# Patient Record
Sex: FEMALE | Race: WHITE | NOT HISPANIC OR LATINO
[De-identification: names, ages, dates, MRNs, and addresses within clinical notes are randomized per-mention and may not be internally consistent; named-entity substitution may affect disease eponyms.]

---

## 2023-01-30 PROBLEM — Z00.00 ENCOUNTER FOR PREVENTIVE HEALTH EXAMINATION: Status: ACTIVE | Noted: 2023-01-30

## 2023-02-08 RX ORDER — APIXABAN 5 MG/1
5 TABLET, FILM COATED ORAL
Refills: 6 | Status: ACTIVE | COMMUNITY

## 2023-02-08 RX ORDER — SIMVASTATIN 20 MG/1
20 TABLET, FILM COATED ORAL DAILY
Refills: 0 | Status: ACTIVE | COMMUNITY

## 2023-02-14 ENCOUNTER — NON-APPOINTMENT (OUTPATIENT)
Age: 63
End: 2023-02-14

## 2023-02-14 ENCOUNTER — APPOINTMENT (OUTPATIENT)
Dept: ELECTROPHYSIOLOGY | Facility: CLINIC | Age: 63
End: 2023-02-14
Payer: MEDICARE

## 2023-02-14 VITALS
DIASTOLIC BLOOD PRESSURE: 89 MMHG | SYSTOLIC BLOOD PRESSURE: 157 MMHG | HEIGHT: 64 IN | BODY MASS INDEX: 30.73 KG/M2 | WEIGHT: 180 LBS | HEART RATE: 79 BPM | OXYGEN SATURATION: 93 %

## 2023-02-14 DIAGNOSIS — Z82.49 FAMILY HISTORY OF ISCHEMIC HEART DISEASE AND OTHER DISEASES OF THE CIRCULATORY SYSTEM: ICD-10-CM

## 2023-02-14 DIAGNOSIS — F17.200 NICOTINE DEPENDENCE, UNSPECIFIED, UNCOMPLICATED: ICD-10-CM

## 2023-02-14 DIAGNOSIS — K42.9 UMBILICAL HERNIA W/OUT OBSTRUCTION OR GANGRENE: ICD-10-CM

## 2023-02-14 PROCEDURE — 93280 PM DEVICE PROGR EVAL DUAL: CPT

## 2023-02-14 PROCEDURE — 99204 OFFICE O/P NEW MOD 45 MIN: CPT | Mod: 25

## 2023-02-14 NOTE — REASON FOR VISIT
[Arrhythmia/ECG Abnorrmalities] : arrhythmia/ECG abnormalities [Spouse] : spouse [FreeTextEntry3] : David Hollis

## 2023-02-14 NOTE — DISCUSSION/SUMMARY
[FreeTextEntry1] : 63 year old woman with atrial arrhythmias.  THe description of feeling significantly better at the time of the pacemaker but shortly after noting shortness of breath AND the description of "burns" on her chest suggest possible cardioversion at this time and the combination of sinus rhythm AND AV synchrony is what made her feel better.  SHe has previously been on amio  I counseled her that the most effect means of rhythm control is ablation and reaffirmed the prior recommendation.  I did suggest an MRI to better assess for infiltration given her heart block at such an early age, ie heart block belwo the age of 61.  She will consider the option of ablation and we will discuss further after the MRI. PPM is MRI conditional and is functioning normally with adequate safety margins.

## 2023-02-14 NOTE — HISTORY OF PRESENT ILLNESS
[FreeTextEntry1] : 63 years old with  history of pacemaker implantation.  Approximately 10 years ago in [place] she was told that she had hypertension and atrial fibrillation.  At one point she was on metoprolol and lisinopril, but she is no longer taking these medications.  In 2018 she presented to the hospital with HF. She was told during this hospitalization that she had a very low heart beat.  She underwent PPM implantation.  She felt great after the pacemaker.  However it quickly got worse.  For the past few years she has fatigue and exercise intolerance. She is under the impression that one of the leads is broken.  She has been evaluated by EP and advised to consider an ablation procedure.  She is very reluctant because she is afraid, AND she knows people that have had it done twice.   She admits to intermittently forgetting her ELiqus dose.  SHe was on amio at some point which was stopped because "she could not be on it for a long time".

## 2023-02-14 NOTE — CARDIOLOGY SUMMARY
[de-identified] : today: Atrial flutter. RBBB \par  -Incomplete right bundle branch block and left axis -anterior fascicular block. \par  -Anterior infarct -age undetermined.  [de-identified] : 10/17/2022\par Global LV systolic function is within normal limits based on limitedviews\par The EF is 60-65% by visual estimate\par Mild KATHE\par LA is moderately dilated\par RA is moderately dilated\par Mild TR [de-identified] : 12/12/2022\par Atherosclerotic changes with no hemodynamically significant stenosis.

## 2023-04-06 ENCOUNTER — APPOINTMENT (OUTPATIENT)
Dept: NUCLEAR MEDICINE | Facility: IMAGING CENTER | Age: 63
End: 2023-04-06
Payer: MEDICARE

## 2023-04-06 ENCOUNTER — TRANSCRIPTION ENCOUNTER (OUTPATIENT)
Age: 63
End: 2023-04-06

## 2023-04-06 ENCOUNTER — OUTPATIENT (OUTPATIENT)
Dept: OUTPATIENT SERVICES | Facility: HOSPITAL | Age: 63
LOS: 1 days | End: 2023-04-06
Payer: MEDICARE

## 2023-04-06 ENCOUNTER — RESULT REVIEW (OUTPATIENT)
Age: 63
End: 2023-04-06

## 2023-04-06 DIAGNOSIS — I44.30 UNSPECIFIED ATRIOVENTRICULAR BLOCK: ICD-10-CM

## 2023-04-06 PROCEDURE — 78451 HT MUSCLE IMAGE SPECT SING: CPT

## 2023-04-06 PROCEDURE — 78451 HT MUSCLE IMAGE SPECT SING: CPT | Mod: 26

## 2023-04-06 PROCEDURE — 78429 MYOCRD IMG PET 1 STD W/CT: CPT | Mod: 26

## 2023-04-06 PROCEDURE — A9500: CPT

## 2023-04-06 PROCEDURE — A9552: CPT

## 2023-04-06 PROCEDURE — 78429 MYOCRD IMG PET 1 STD W/CT: CPT

## 2023-04-20 ENCOUNTER — APPOINTMENT (OUTPATIENT)
Dept: NUCLEAR MEDICINE | Facility: IMAGING CENTER | Age: 63
End: 2023-04-20
Payer: SELF-PAY

## 2023-04-20 ENCOUNTER — OUTPATIENT (OUTPATIENT)
Dept: OUTPATIENT SERVICES | Facility: HOSPITAL | Age: 63
LOS: 1 days | End: 2023-04-20
Payer: SELF-PAY

## 2023-04-20 ENCOUNTER — RESULT REVIEW (OUTPATIENT)
Age: 63
End: 2023-04-20

## 2023-04-20 DIAGNOSIS — I44.30 UNSPECIFIED ATRIOVENTRICULAR BLOCK: ICD-10-CM

## 2023-04-20 PROCEDURE — A9552: CPT

## 2023-04-20 PROCEDURE — 78429 MYOCRD IMG PET 1 STD W/CT: CPT | Mod: 26

## 2023-04-20 PROCEDURE — 78429 MYOCRD IMG PET 1 STD W/CT: CPT

## 2023-05-23 ENCOUNTER — APPOINTMENT (OUTPATIENT)
Dept: ELECTROPHYSIOLOGY | Facility: CLINIC | Age: 63
End: 2023-05-23
Payer: MEDICARE

## 2023-05-23 VITALS
BODY MASS INDEX: 30.73 KG/M2 | WEIGHT: 180 LBS | HEIGHT: 64 IN | OXYGEN SATURATION: 93 % | SYSTOLIC BLOOD PRESSURE: 148 MMHG | DIASTOLIC BLOOD PRESSURE: 96 MMHG | HEART RATE: 86 BPM

## 2023-05-23 PROCEDURE — 93280 PM DEVICE PROGR EVAL DUAL: CPT

## 2023-05-23 PROCEDURE — 99215 OFFICE O/P EST HI 40 MIN: CPT | Mod: 25

## 2023-05-23 RX ORDER — FUROSEMIDE 20 MG/1
20 TABLET ORAL DAILY
Refills: 0 | Status: DISCONTINUED | COMMUNITY
End: 2023-05-23

## 2023-05-23 NOTE — REASON FOR VISIT
[Arrhythmia/ECG Abnorrmalities] : arrhythmia/ECG abnormalities [Spouse] : spouse [FreeTextEntry3] : Daivd Hollis

## 2023-05-23 NOTE — DISCUSSION/SUMMARY
[FreeTextEntry1] : 63 year old woman with atrial arrhythmias.  THe description of feeling significantly better at the time of the pacemaker but shortly after noting shortness of breath AND the description of "burns" on her chest suggest possible cardioversion at this time and the combination of sinus rhythm AND AV synchrony is what made her feel better.  SHe has previously been on amio  I counseled her that the most effect means of rhythm control is ablation and reaffirmed the prior recommendation.  PET was negative for Sarcoid (she will follow up with PMD regarding the hernia and FDG avid lymph node).  We again discussed options for ablation (cathter versus surgical).  All of her questions were answered and we will plan for catheter ablation. We discussed possible admission for TIkon post post ablation.

## 2023-05-23 NOTE — CARDIOLOGY SUMMARY
[de-identified] : today: AF\par 2/14/2023: Atrial flutter. RBBB \par  -Incomplete right bundle branch block and left axis -anterior fascicular block. \par  -Anterior infarct -age undetermined.  [de-identified] : 10/17/2022\par Global LV systolic function is within normal limits based on limitedviews\par The EF is 60-65% by visual estimate\par Mild KATHE\par LA is moderately dilated\par RA is moderately dilated\par Mild TR [de-identified] : 12/12/2022\par Atherosclerotic changes with no hemodynamically significant stenosis. [de-identified] : PET 4/13/2023\par No evidence of FDG avid myocardial inflammation

## 2023-05-23 NOTE — PROCEDURE
[Pacemaker] : pacemaker [de-identified] : [employer] [de-identified] : Assurity MRI [de-identified] : [id number] [de-identified] : 4/17/2018

## 2023-05-23 NOTE — HISTORY OF PRESENT ILLNESS
[FreeTextEntry1] : 63 years old with  history of pacemaker implantation.  Approximately 10 years ago in [place] she was told that she had hypertension and atrial fibrillation.  At one point she was on metoprolol and lisinopril, but she is no longer taking these medications.  In 2018 she presented to the hospital with HF. She was told during this hospitalization that she had a very low heart beat.  She underwent PPM implantation.  She felt great after the pacemaker.  However it quickly got worse.  For the past few years she has fatigue and exercise intolerance. She is under the impression that one of the leads is broken.  She has been evaluated by EP and advised to consider an ablation procedure.  She is very reluctant because she is afraid, AND she knows people that have had it done twice.   She admits to intermittently forgetting her ELiqus dose.  SHe was on amio at some point which was stopped because "she could not be on it for a long time".\par \par She had a recent hospitalization for HF at [place]. She improved with IV Lasix.  She is now on home O2 as needed.  She needs this more at night time.  She is not smoking any more.

## 2023-07-11 ENCOUNTER — NON-APPOINTMENT (OUTPATIENT)
Age: 63
End: 2023-07-11

## 2023-07-26 ENCOUNTER — TRANSCRIPTION ENCOUNTER (OUTPATIENT)
Age: 63
End: 2023-07-26

## 2023-07-26 ENCOUNTER — INPATIENT (INPATIENT)
Facility: HOSPITAL | Age: 63
LOS: 7 days | Discharge: HOME | DRG: 273 | End: 2023-08-03
Attending: INTERNAL MEDICINE | Admitting: INTERNAL MEDICINE
Payer: MEDICARE

## 2023-07-26 VITALS
SYSTOLIC BLOOD PRESSURE: 118 MMHG | RESPIRATION RATE: 18 BRPM | WEIGHT: 179.9 LBS | OXYGEN SATURATION: 91 % | HEIGHT: 64 IN | HEART RATE: 87 BPM | TEMPERATURE: 98 F | DIASTOLIC BLOOD PRESSURE: 46 MMHG

## 2023-07-26 DIAGNOSIS — Z87.19 PERSONAL HISTORY OF OTHER DISEASES OF THE DIGESTIVE SYSTEM: Chronic | ICD-10-CM

## 2023-07-26 DIAGNOSIS — I48.91 UNSPECIFIED ATRIAL FIBRILLATION: ICD-10-CM

## 2023-07-26 LAB
ANION GAP SERPL CALC-SCNC: 10 MMOL/L — SIGNIFICANT CHANGE UP (ref 5–17)
BLD GP AB SCN SERPL QL: NEGATIVE — SIGNIFICANT CHANGE UP
BUN SERPL-MCNC: 10 MG/DL — SIGNIFICANT CHANGE UP (ref 7–23)
CALCIUM SERPL-MCNC: 10.3 MG/DL — SIGNIFICANT CHANGE UP (ref 8.4–10.5)
CHLORIDE SERPL-SCNC: 103 MMOL/L — SIGNIFICANT CHANGE UP (ref 96–108)
CO2 SERPL-SCNC: 29 MMOL/L — SIGNIFICANT CHANGE UP (ref 22–31)
CREAT SERPL-MCNC: 0.47 MG/DL — LOW (ref 0.5–1.3)
EGFR: 108 ML/MIN/1.73M2 — SIGNIFICANT CHANGE UP
GLUCOSE SERPL-MCNC: 97 MG/DL — SIGNIFICANT CHANGE UP (ref 70–99)
HCT VFR BLD CALC: 46.2 % — HIGH (ref 34.5–45)
HGB BLD-MCNC: 14.7 G/DL — SIGNIFICANT CHANGE UP (ref 11.5–15.5)
MCHC RBC-ENTMCNC: 30.2 PG — SIGNIFICANT CHANGE UP (ref 27–34)
MCHC RBC-ENTMCNC: 31.8 GM/DL — LOW (ref 32–36)
MCV RBC AUTO: 94.9 FL — SIGNIFICANT CHANGE UP (ref 80–100)
NRBC # BLD: 0 /100 WBCS — SIGNIFICANT CHANGE UP (ref 0–0)
PLATELET # BLD AUTO: 144 K/UL — LOW (ref 150–400)
POTASSIUM SERPL-MCNC: 4.6 MMOL/L — SIGNIFICANT CHANGE UP (ref 3.5–5.3)
POTASSIUM SERPL-SCNC: 4.6 MMOL/L — SIGNIFICANT CHANGE UP (ref 3.5–5.3)
RBC # BLD: 4.87 M/UL — SIGNIFICANT CHANGE UP (ref 3.8–5.2)
RBC # FLD: 13.2 % — SIGNIFICANT CHANGE UP (ref 10.3–14.5)
RH IG SCN BLD-IMP: POSITIVE — SIGNIFICANT CHANGE UP
RH IG SCN BLD-IMP: POSITIVE — SIGNIFICANT CHANGE UP
SODIUM SERPL-SCNC: 142 MMOL/L — SIGNIFICANT CHANGE UP (ref 135–145)
WBC # BLD: 5.4 K/UL — SIGNIFICANT CHANGE UP (ref 3.8–10.5)
WBC # FLD AUTO: 5.4 K/UL — SIGNIFICANT CHANGE UP (ref 3.8–10.5)

## 2023-07-26 PROCEDURE — 93655 ICAR CATH ABLTJ DSCRT ARRHYT: CPT

## 2023-07-26 PROCEDURE — 93653 COMPRE EP EVAL TX SVT: CPT

## 2023-07-26 PROCEDURE — 93010 ELECTROCARDIOGRAM REPORT: CPT

## 2023-07-26 RX ORDER — SIMVASTATIN 20 MG/1
1 TABLET, FILM COATED ORAL
Refills: 0 | DISCHARGE

## 2023-07-26 RX ORDER — APIXABAN 2.5 MG/1
5 TABLET, FILM COATED ORAL EVERY 12 HOURS
Refills: 0 | Status: DISCONTINUED | OUTPATIENT
Start: 2023-07-26 | End: 2023-08-03

## 2023-07-26 RX ORDER — IPRATROPIUM BROMIDE 0.2 MG/ML
500 SOLUTION, NON-ORAL INHALATION ONCE
Refills: 0 | Status: COMPLETED | OUTPATIENT
Start: 2023-07-26 | End: 2023-07-30

## 2023-07-26 RX ORDER — ACETAMINOPHEN 500 MG
1000 TABLET ORAL ONCE
Refills: 0 | Status: COMPLETED | OUTPATIENT
Start: 2023-07-26 | End: 2023-07-26

## 2023-07-26 RX ORDER — FUROSEMIDE 40 MG
20 TABLET ORAL ONCE
Refills: 0 | Status: COMPLETED | OUTPATIENT
Start: 2023-07-27 | End: 2023-07-27

## 2023-07-26 RX ORDER — APIXABAN 2.5 MG/1
1 TABLET, FILM COATED ORAL
Refills: 0 | DISCHARGE

## 2023-07-26 RX ADMIN — Medication 400 MILLIGRAM(S): at 18:20

## 2023-07-26 RX ADMIN — APIXABAN 5 MILLIGRAM(S): 2.5 TABLET, FILM COATED ORAL at 21:42

## 2023-07-26 NOTE — H&P CARDIOLOGY - NSICDXPASTMEDICALHX_GEN_ALL_CORE_FT
PAST MEDICAL HISTORY:  Acute on chronic systolic congestive heart failure     Chronic atrial fibrillation     Former light tobacco smoker     HTN (hypertension)     Pacemaker

## 2023-07-26 NOTE — H&P CARDIOLOGY - HISTORY OF PRESENT ILLNESS
This is a 64y/o  female with known implantable device PPM [employer] 2018 Covid 19 negative unvaccinated with PMHX of PPM Afib on Eliquis last dose on Monday 7/24/23 AM dose , HTN, Tobacco smoker current 0.5 ppd , CHF and AVB. PT presents with recent increasing fatigue sob with exertion and decrease exercise tolerance.  This is a 62y/o  female with known implantable device PPM [employer] 2018 Covid 19 negative unvaccinated with PMHX of PPM Afib on Eliquis last dose on Monday 7/24/23 AM dose , HTN, Tobacco smoker current 0.5 ppd , CHF and AVB. PT presents with recent increasing fatigue sob with exertion and decrease exercise tolerance. Now referred for Afib Ablation with Dr. Roca. Currently no acute distress noted no palpitations noted.   Pt Cardiologist Dr. Serna ( [facility] )     < from: NM PET/CT Myocardial Sarcoidosis FDG (04.20.23 @ 13:19) >  OTHER STUDIES USED FOR CORRELATION: None    FINDINGS:    FDG-PET/CT images of the heart show physiologic blood pool activity.   There is no myocardial uptake of FDG. Previously seen diffuse myocardial   hypermetabolism is not present on the current study.    IMPRESSION: FDG PET/CT of the heart shows no evidence of FDG-avid   myocardial inflammation. Diffuse myocardial hypermetabolism seen on prior   study dated 4/6/2023, is not seen on the current study, and most likely   was due to inadequate dietary preparation.    --- End of Report ---               AGUSTINA GILES MD; Attending Nuclear Medicine     < end of copied text >

## 2023-07-26 NOTE — H&P CARDIOLOGY - CARDIOVASCULAR DETAILS
Understanding Hives (Urticaria)  Urticaria (hives) are red, itchy, and swollen areas on the skin. They are most often an allergic reaction from eating a food or taking a medicine. Sometimes the cause may be unknown. A single hive can vary in size from a half inch to several inches. Hives can appear all over the body. Or they may appear on only one part of the body.  What causes hives?  Hives can be caused by food and beverages such as:  · Tree nuts (almonds, walnuts, hazelnuts)  · Peanuts  · Eggs  · Shellfish  · Milk  Hives can also be caused by medicines such as:  · Antibiotics, especially penicillin and sulfa-based medicines   · Anticonvulsant or antiseizure medicines   · Chemotherapy medicines   Other causes of hives include:  · Infection or virus  · Heat  · Cold air or cold water  · Exercise  · Scratching or rubbing your skin, or wearing tight-fitting clothes that rub your skin  · Being exposed to sunlight or light from a light bulb, in rare cases  · Inhaled-chemicals in the environment from foods and drugs, insects, plants, or other sources  In some cases, hives may occur again and again with no specific cause.  If you have hives  · Avoid the food, drink, medicine, or other factor that may be causing the hives.  · Make a thick paste of baking soda and water. Apply the paste directly to your skin. This can help lessen itching.  · Talk with your healthcare provider right away if you think a medicine gave you hives.  Watch for anaphylaxis  If you have hives, watch for symptoms of a severe reaction that can affect your entire body. This is called anaphylaxis. Symptoms can include swollen areas of the body, wheezing, trouble breathing or swallowing, and a hoarse voice. This reaction may happen right away. Or it may happen in an hour or more. In extreme cases, the airways from mouth to lungs may swell and make breathing difficult. This is a medical emergency. Use epinephrine medicine if you have it, and call 911 or  go to the emergency room.     When to call your healthcare provider  Call your healthcare provider if:  · Your hives feel uncomfortable  · You have never had hives before  · Your symptoms don't go away or come back  · Your symptoms get worse or new symptoms develop such as:   ¨ Sneezing, coughing, runny or stuffy nose  ¨ Itching of the eyes, nose, or roof of the mouth  ¨ Itching, burning, stinging, or pain  ¨ Dry, flaky, cracking, or scaly skin  ¨ Red or purple spots  When to call 911  Call 911 right away if you have:  · Swelling in your lips, tongue, or throat, called angioedema  · Drooling  · Trouble breathing, talking, or swallowing  · Cool, moist or pale (blue in color) skin  · Fast and weak heartbeat  · Wheezing or short of breath  · Feeling lightheaded or confused  · Diarrhea  · Severe nausea or vomiting  · Seizure  · Feeling dizzy or weak, or a sudden drop in blood pressure   Date Last Reviewed: 4/1/2017 © 2000-2017 [employer]. [address] [place] [address], [place] [address]. All rights reserved. This information is not intended as a substitute for professional medical care. Always follow your healthcare professional's instructions.    Use topical Benadryl or Cortizone as needed to control rash symptoms.     Avoid Penicillin drugs in future unless he is tested to confirm whether allergic.      irregular rate and rhythm

## 2023-07-26 NOTE — H&P CARDIOLOGY - NSICDXFAMILYHX_GEN_ALL_CORE_FT
FAMILY HISTORY:  Father  Still living? No  FH: CHF (congestive heart failure), Age at diagnosis: Age Unknown    Mother  Still living? No  FH: myocardial infarction, Age at diagnosis: Age Unknown

## 2023-07-26 NOTE — PRE-ANESTHESIA EVALUATION ADULT - NSANTHPMHFT_GEN_ALL_CORE
63 years old with history of HTN, Afib, and pacemaker implantation.       PPM inteorrogation 5/23 battery longevity 7.2-7.5 years

## 2023-07-27 DIAGNOSIS — I50.23 ACUTE ON CHRONIC SYSTOLIC (CONGESTIVE) HEART FAILURE: ICD-10-CM

## 2023-07-27 DIAGNOSIS — I48.20 CHRONIC ATRIAL FIBRILLATION, UNSPECIFIED: ICD-10-CM

## 2023-07-27 DIAGNOSIS — I10 ESSENTIAL (PRIMARY) HYPERTENSION: ICD-10-CM

## 2023-07-27 LAB
ANION GAP SERPL CALC-SCNC: 10 MMOL/L — SIGNIFICANT CHANGE UP (ref 5–17)
BUN SERPL-MCNC: 13 MG/DL — SIGNIFICANT CHANGE UP (ref 7–23)
CALCIUM SERPL-MCNC: 9.6 MG/DL — SIGNIFICANT CHANGE UP (ref 8.4–10.5)
CHLORIDE SERPL-SCNC: 104 MMOL/L — SIGNIFICANT CHANGE UP (ref 96–108)
CO2 SERPL-SCNC: 26 MMOL/L — SIGNIFICANT CHANGE UP (ref 22–31)
CREAT SERPL-MCNC: 0.6 MG/DL — SIGNIFICANT CHANGE UP (ref 0.5–1.3)
EGFR: 101 ML/MIN/1.73M2 — SIGNIFICANT CHANGE UP
GLUCOSE SERPL-MCNC: 195 MG/DL — HIGH (ref 70–99)
HCT VFR BLD CALC: 49.9 % — HIGH (ref 34.5–45)
HGB BLD-MCNC: 15.2 G/DL — SIGNIFICANT CHANGE UP (ref 11.5–15.5)
MCHC RBC-ENTMCNC: 29.8 PG — SIGNIFICANT CHANGE UP (ref 27–34)
MCHC RBC-ENTMCNC: 30.5 GM/DL — LOW (ref 32–36)
MCV RBC AUTO: 97.8 FL — SIGNIFICANT CHANGE UP (ref 80–100)
NRBC # BLD: 0 /100 WBCS — SIGNIFICANT CHANGE UP (ref 0–0)
NT-PROBNP SERPL-SCNC: 2761 PG/ML — HIGH (ref 0–300)
PLATELET # BLD AUTO: 171 K/UL — SIGNIFICANT CHANGE UP (ref 150–400)
POTASSIUM SERPL-MCNC: 4.8 MMOL/L — SIGNIFICANT CHANGE UP (ref 3.5–5.3)
POTASSIUM SERPL-SCNC: 4.8 MMOL/L — SIGNIFICANT CHANGE UP (ref 3.5–5.3)
PROCALCITONIN SERPL-MCNC: 0.09 NG/ML — SIGNIFICANT CHANGE UP (ref 0.02–0.1)
RBC # BLD: 5.1 M/UL — SIGNIFICANT CHANGE UP (ref 3.8–5.2)
RBC # FLD: 13.3 % — SIGNIFICANT CHANGE UP (ref 10.3–14.5)
SODIUM SERPL-SCNC: 140 MMOL/L — SIGNIFICANT CHANGE UP (ref 135–145)
URATE SERPL-MCNC: 4.7 MG/DL — SIGNIFICANT CHANGE UP (ref 2.5–7)
WBC # BLD: 8.09 K/UL — SIGNIFICANT CHANGE UP (ref 3.8–10.5)
WBC # FLD AUTO: 8.09 K/UL — SIGNIFICANT CHANGE UP (ref 3.8–10.5)

## 2023-07-27 PROCEDURE — 93320 DOPPLER ECHO COMPLETE: CPT | Mod: 26

## 2023-07-27 PROCEDURE — 99238 HOSP IP/OBS DSCHRG MGMT 30/<: CPT

## 2023-07-27 PROCEDURE — 93306 TTE W/DOPPLER COMPLETE: CPT | Mod: 26

## 2023-07-27 PROCEDURE — 99223 1ST HOSP IP/OBS HIGH 75: CPT | Mod: GC

## 2023-07-27 PROCEDURE — 93312 ECHO TRANSESOPHAGEAL: CPT | Mod: 26

## 2023-07-27 PROCEDURE — 93010 ELECTROCARDIOGRAM REPORT: CPT | Mod: 59

## 2023-07-27 PROCEDURE — 93325 DOPPLER ECHO COLOR FLOW MAPG: CPT | Mod: 26

## 2023-07-27 PROCEDURE — 71045 X-RAY EXAM CHEST 1 VIEW: CPT | Mod: 26

## 2023-07-27 RX ORDER — FUROSEMIDE 40 MG
60 TABLET ORAL EVERY 12 HOURS
Refills: 0 | Status: DISCONTINUED | OUTPATIENT
Start: 2023-07-27 | End: 2023-07-28

## 2023-07-27 RX ORDER — CEFAZOLIN SODIUM 1 G
1000 VIAL (EA) INJECTION EVERY 8 HOURS
Refills: 0 | Status: DISCONTINUED | OUTPATIENT
Start: 2023-07-27 | End: 2023-07-29

## 2023-07-27 RX ORDER — FUROSEMIDE 40 MG
40 TABLET ORAL ONCE
Refills: 0 | Status: COMPLETED | OUTPATIENT
Start: 2023-07-27 | End: 2023-07-27

## 2023-07-27 RX ORDER — ONDANSETRON 8 MG/1
4 TABLET, FILM COATED ORAL ONCE
Refills: 0 | Status: COMPLETED | OUTPATIENT
Start: 2023-07-27 | End: 2023-07-27

## 2023-07-27 RX ORDER — ACETAMINOPHEN 500 MG
650 TABLET ORAL EVERY 6 HOURS
Refills: 0 | Status: DISCONTINUED | OUTPATIENT
Start: 2023-07-27 | End: 2023-08-03

## 2023-07-27 RX ORDER — PANTOPRAZOLE SODIUM 20 MG/1
40 TABLET, DELAYED RELEASE ORAL
Refills: 0 | Status: DISCONTINUED | OUTPATIENT
Start: 2023-07-27 | End: 2023-08-03

## 2023-07-27 RX ORDER — CEFAZOLIN SODIUM 1 G
VIAL (EA) INJECTION
Refills: 0 | Status: DISCONTINUED | OUTPATIENT
Start: 2023-07-27 | End: 2023-07-29

## 2023-07-27 RX ORDER — IPRATROPIUM/ALBUTEROL SULFATE 18-103MCG
3 AEROSOL WITH ADAPTER (GRAM) INHALATION EVERY 6 HOURS
Refills: 0 | Status: DISCONTINUED | OUTPATIENT
Start: 2023-07-27 | End: 2023-08-03

## 2023-07-27 RX ORDER — ATORVASTATIN CALCIUM 80 MG/1
40 TABLET, FILM COATED ORAL AT BEDTIME
Refills: 0 | Status: DISCONTINUED | OUTPATIENT
Start: 2023-07-27 | End: 2023-08-03

## 2023-07-27 RX ORDER — TIOTROPIUM BROMIDE 18 UG/1
2 CAPSULE ORAL; RESPIRATORY (INHALATION) DAILY
Refills: 0 | Status: DISCONTINUED | OUTPATIENT
Start: 2023-07-27 | End: 2023-08-03

## 2023-07-27 RX ORDER — BUDESONIDE AND FORMOTEROL FUMARATE DIHYDRATE 160; 4.5 UG/1; UG/1
2 AEROSOL RESPIRATORY (INHALATION)
Refills: 0 | Status: DISCONTINUED | OUTPATIENT
Start: 2023-07-27 | End: 2023-08-03

## 2023-07-27 RX ORDER — CEFAZOLIN SODIUM 1 G
1000 VIAL (EA) INJECTION ONCE
Refills: 0 | Status: COMPLETED | OUTPATIENT
Start: 2023-07-27 | End: 2023-07-27

## 2023-07-27 RX ORDER — NICOTINE POLACRILEX 2 MG
1 GUM BUCCAL DAILY
Refills: 0 | Status: DISCONTINUED | OUTPATIENT
Start: 2023-07-27 | End: 2023-08-03

## 2023-07-27 RX ADMIN — Medication 650 MILLIGRAM(S): at 21:25

## 2023-07-27 RX ADMIN — ONDANSETRON 4 MILLIGRAM(S): 8 TABLET, FILM COATED ORAL at 01:20

## 2023-07-27 RX ADMIN — Medication 100 MILLIGRAM(S): at 18:17

## 2023-07-27 RX ADMIN — Medication 1 PATCH: at 19:45

## 2023-07-27 RX ADMIN — Medication 3 MILLILITER(S): at 18:17

## 2023-07-27 RX ADMIN — Medication 650 MILLIGRAM(S): at 15:45

## 2023-07-27 RX ADMIN — ATORVASTATIN CALCIUM 40 MILLIGRAM(S): 80 TABLET, FILM COATED ORAL at 21:06

## 2023-07-27 RX ADMIN — Medication 20 MILLIGRAM(S): at 04:31

## 2023-07-27 RX ADMIN — APIXABAN 5 MILLIGRAM(S): 2.5 TABLET, FILM COATED ORAL at 09:17

## 2023-07-27 RX ADMIN — Medication 1 PATCH: at 18:34

## 2023-07-27 RX ADMIN — Medication 650 MILLIGRAM(S): at 22:25

## 2023-07-27 RX ADMIN — BUDESONIDE AND FORMOTEROL FUMARATE DIHYDRATE 2 PUFF(S): 160; 4.5 AEROSOL RESPIRATORY (INHALATION) at 18:17

## 2023-07-27 RX ADMIN — Medication 650 MILLIGRAM(S): at 15:02

## 2023-07-27 RX ADMIN — Medication 40 MILLIGRAM(S): at 07:39

## 2023-07-27 RX ADMIN — Medication 60 MILLIGRAM(S): at 17:16

## 2023-07-27 RX ADMIN — Medication 3 MILLILITER(S): at 23:39

## 2023-07-27 RX ADMIN — ONDANSETRON 4 MILLIGRAM(S): 8 TABLET, FILM COATED ORAL at 18:25

## 2023-07-27 RX ADMIN — ONDANSETRON 4 MILLIGRAM(S): 8 TABLET, FILM COATED ORAL at 08:26

## 2023-07-27 RX ADMIN — APIXABAN 5 MILLIGRAM(S): 2.5 TABLET, FILM COATED ORAL at 21:00

## 2023-07-27 RX ADMIN — Medication 40 MILLIGRAM(S): at 18:17

## 2023-07-27 RX ADMIN — Medication 100 MILLIGRAM(S): at 21:06

## 2023-07-27 NOTE — DISCHARGE NOTE PROVIDER - CARE PROVIDERS DIRECT ADDRESSES
,[email or web address],DirectAddress_Unknown,DirectAddress_Unknown,DirectAddress_Unknown

## 2023-07-27 NOTE — DISCHARGE NOTE PROVIDER - HOSPITAL COURSE
HPI:  This is a 64y/o  female with known implantable device PPM [employer]a 2018 Covid 19 negative unvaccinated with PMHX of PPM Afib on Eliquis last dose on Monday 7/24/23 AM dose , HTN, Tobacco smoker current 0.5 ppd , CHF and AVB. PT presents with recent increasing fatigue sob with exertion and decrease exercise tolerance. Now referred for Afib Ablation with Dr. Roca. Currently no acute distress noted no palpitations noted.   Pt Cardiologist Dr. Serna ( [facility] )     7/26 s/p atrial fib ablation. Right femoral site without swelling, bleeding.   HPI:  This is a 62y/o  female with known implantable device PPM [employer] 2018 Covid 19 negative unvaccinated with PMHX of PPM Afib on Eliquis last dose on Monday 7/24/23 AM dose , HTN, Tobacco smoker current 0.5 ppd , CHF and AVB. PT presents with recent increasing fatigue sob with exertion and decrease exercise tolerance. Now referred for Afib Ablation with Dr. Roca. Currently no acute distress noted no palpitations noted.   Pt Cardiologist Dr. Serna ( [facility] )   7/26 s/p atrial fib ablation. Right femoral site without swelling, bleeding.   S/p ablation patient developed Acute on chronic HF and was started on Lasix drip where she has diuresed well.  Patient was started on steriod taper per pulmonary for  history of COPD and ? PNA on CT scan of chest .  As a result patient became hyperglycemia, Lantus /pre-meal doses were increased .  LE cellulitis was treated with Ancef     Patient is stable for discharge with outpatient follow up with Dr. Roca on 9/12/23, Dr. Morgan, Dr. Johnson and Dr. Clark.

## 2023-07-27 NOTE — DISCHARGE NOTE PROVIDER - PROVIDER TOKENS
PROVIDER:[TOKEN:[[id number]],SCHEDULEDAPPT:[09/12/2023]] PROVIDER:[TOKEN:[[id number]],SCHEDULEDAPPT:[09/12/2023]],PROVIDER:[TOKEN:[[id number]],FOLLOWUP:[1 week]],PROVIDER:[TOKEN:[[id number]],FOLLOWUP:[1 week]],PROVIDER:[TOKEN:[[id number]],FOLLOWUP:[2 weeks]]

## 2023-07-27 NOTE — DISCHARGE NOTE PROVIDER - NSDCCPTREATMENT_GEN_ALL_CORE_FT
PRINCIPAL PROCEDURE  Procedure: Intracardiac catheter ablation for atrial fibrillation  Findings and Treatment: Atrial fib ablation

## 2023-07-27 NOTE — DISCHARGE NOTE PROVIDER - NSDCFUSCHEDAPPT_GEN_ALL_CORE_FT
Hermilo Roca  [facility]  ELECTROPH [address]  Scheduled Appointment: 09/12/2023     Marycarmen Luong  [facility]  [address]MMED [address]  Scheduled Appointment: 08/08/2023    Hermilo Roca  [facility]  [address]ROPH [address]  Scheduled Appointment: 09/12/2023

## 2023-07-27 NOTE — DISCHARGE NOTE PROVIDER - NSDCMRMEDTOKEN_GEN_ALL_CORE_FT
Eliquis 5 mg oral tablet: 1 orally 2 times a day  Lasix 40 mg oral tablet: 1 orally once a day  simvastatin 20 mg oral tablet: 1 orally once a day   Eliquis 5 mg oral tablet: 1 orally 2 times a day  simvastatin 20 mg oral tablet: 1 orally once a day   budesonide-formoterol 160 mcg-4.5 mcg/inh inhalation aerosol: 2 puff(s) inhaled 2 times a day  bumetanide 1 mg oral tablet: 2 tab(s) orally once a day  Eliquis 5 mg oral tablet: 1 orally 2 times a day  ipratropium-albuterol 0.5 mg-2.5 mg/3 mL inhalation solution: 3 milliliter(s) inhaled every 6 hours  losartan 25 mg oral tablet: 1 tab(s) orally once a day  Nebulizer: Use as Directed  nicotine 7 mg/24 hr transdermal film, extended release: 1 patch transdermal once a day  pantoprazole 40 mg oral delayed release tablet: 1 tab(s) orally once a day (before a meal)  simvastatin 20 mg oral tablet: 1 orally once a day  spironolactone 25 mg oral tablet: 1 tab(s) orally once a day  tiotropium 2.5 mcg/inh inhalation aerosol: 2 puff(s) inhaled once a day   budesonide-formoterol 160 mcg-4.5 mcg/inh inhalation aerosol: 2 puff(s) inhaled 2 times a day  bumetanide 1 mg oral tablet: 2 tab(s) orally once a day  Eliquis 5 mg oral tablet: 1 orally 2 times a day  ipratropium-albuterol 0.5 mg-2.5 mg/3 mL inhalation solution: 3 milliliter(s) inhaled every 6 hours  losartan 25 mg oral tablet: 1 tab(s) orally once a day  nicotine 7 mg/24 hr transdermal film, extended release: 1 patch transdermal once a day  pantoprazole 40 mg oral delayed release tablet: 1 tab(s) orally once a day (before a meal)  potassium chloride 20 mEq oral tablet, extended release: 1 tab(s) orally once a day  simvastatin 20 mg oral tablet: 1 orally once a day  spironolactone 25 mg oral tablet: 1 tab(s) orally once a day  tiotropium 2.5 mcg/inh inhalation aerosol: 2 puff(s) inhaled once a day

## 2023-07-27 NOTE — DISCHARGE NOTE PROVIDER - CARE PROVIDER_API CALL
Hermilo Roca.  Cardiac Electrophysiology  [address] [place] [address]  Phone: [phone]  Fax: [phone]  Scheduled Appointment: 09/12/2023   Hermilo Roca.  Cardiac Electrophysiology  [address] [place] [address]  Phone: [phone]  Fax: [phone]  Scheduled Appointment: 09/12/2023    García Johnson  Cardiovascular Disease  [address] [facility] [address]  [place] [address]  Phone: [phone]  Fax: [phone]  Follow Up Time: 1 week    Zora Morgan  Pulmonary Disease  [address] [facility], [address]  [place] [address]  Phone: [phone]  Fax: [phone]  Follow Up Time: 1 week    Natalie Hagan  Internal Medicine  [address] [place] [address], [address]  [place] [address]  Phone: [phone]  Fax: [phone]  Follow Up Time: 2 weeks

## 2023-07-27 NOTE — PROGRESS NOTE ADULT - SUBJECTIVE AND OBJECTIVE BOX
Patient is a 63y old  Female who presents with a chief complaint of Atrial fib (27 Jul 2023 01:42)      SUBJECTIVE / OVERNIGHT EVENTS: ptn transferred to my service 2/2 acute hypoxic respiratory failure, systolic  R heart failure and diastolic L heart failure.. awaiting HF consult, s/p Afib ablation, now in sinus, on ELiquis. ptn is a chronic smoker, h/o PPM 2/2 AVB    MEDICATIONS  (STANDING):  apixaban 5 milliGRAM(s) Oral every 12 hours  atorvastatin 40 milliGRAM(s) Oral at bedtime  furosemide   Injectable 60 milliGRAM(s) IV Push every 12 hours    MEDICATIONS  (PRN):  acetaminophen     Tablet .. 650 milliGRAM(s) Oral every 6 hours PRN Temp greater or equal to 38C (100.4F), Moderate Pain (4 - 6)  ipratropium  for Nebulization. 500 MICROGram(s) Nebulizer once PRN Shortness of Breath      Vital Signs Last 24 Hrs  T(F): 97.8 (07-27-23 @ 15:47), Max: 98 (07-27-23 @ 07:43)  HR: 71 (07-27-23 @ 15:47) (60 - 100)  BP: 102/59 (07-27-23 @ 15:47) (100/53 - 176/75)  RR: 17 (07-27-23 @ 15:47) (15 - 19)  SpO2: 90% (07-27-23 @ 15:47) (86% - 96%)  Telemetry:   CAPILLARY BLOOD GLUCOSE        I&O's Summary    26 Jul 2023 07:01  -  27 Jul 2023 07:00  --------------------------------------------------------  IN: 0 mL / OUT: 800 mL / NET: -800 mL    27 Jul 2023 07:01  -  27 Jul 2023 16:51  --------------------------------------------------------  IN: 480 mL / OUT: 700 mL / NET: -220 mL        PHYSICAL EXAM:  GENERAL: NAD, well-developed  HEAD:  Atraumatic, Normocephalic  EYES: EOMI, PERRLA, conjunctiva and sclera clear  NECK: Supple, No JVD  CHEST/LUNG: b/l rales  HEART: Regular rate and rhythm; No murmurs, rubs, or gallops  ABDOMEN: Soft, Nontender, Nondistended; Bowel sounds present  EXTREMITIES:  2+ Peripheral Pulses, No clubbing, cyanosis, or edema  PSYCH: AAOx3  NEUROLOGY: non-focal  SKIN: No rashes or lesions    LABS:                        15.2   8.09  )-----------( 171      ( 27 Jul 2023 01:11 )             49.9     07-27    140  |  104  |  13  ----------------------------<  195<H>  4.8   |  26  |  0.60    Ca    9.6      27 Jul 2023 01:11            Urinalysis Basic - ( 27 Jul 2023 01:11 )    Color: x / Appearance: x / SG: x / pH: x  Gluc: 195 mg/dL / Ketone: x  / Bili: x / Urobili: x   Blood: x / Protein: x / Nitrite: x   Leuk Esterase: x / RBC: x / WBC x   Sq Epi: x / Non Sq Epi: x / Bacteria: x           Patient is a 63y old  Female who presents with a chief complaint of Atrial fib (27 Jul 2023 01:42)      SUBJECTIVE / OVERNIGHT EVENTS: ptn transferred to my service 2/2 acute hypoxic respiratory failure, systolic  R heart failure and diastolic L heart failure.. awaiting HF consult, s/p Afib ablation, now in sinus, on ELiquis. ptn is a chronic smoker, h/o PPM 2/2 AVB. ptn also c/o pleuritic CP " ever since i woke up from anesthesia" ptn states she is on home O2,, 2LNC. She is not on any COPD meds/inhalers, has a pulmonologist, smokes 1 PPD x50 yrs.  ptn is noted to have LE erythema, she has tend over LE    MEDICATIONS  (STANDING):  apixaban 5 milliGRAM(s) Oral every 12 hours  atorvastatin 40 milliGRAM(s) Oral at bedtime  furosemide   Injectable 60 milliGRAM(s) IV Push every 12 hours    MEDICATIONS  (PRN):  acetaminophen     Tablet .. 650 milliGRAM(s) Oral every 6 hours PRN Temp greater or equal to 38C (100.4F), Moderate Pain (4 - 6)  ipratropium  for Nebulization. 500 MICROGram(s) Nebulizer once PRN Shortness of Breath      Vital Signs Last 24 Hrs  T(F): 97.8 (07-27-23 @ 15:47), Max: 98 (07-27-23 @ 07:43)  HR: 71 (07-27-23 @ 15:47) (60 - 100)  BP: 102/59 (07-27-23 @ 15:47) (100/53 - 176/75)  RR: 17 (07-27-23 @ 15:47) (15 - 19)  SpO2: 90% (07-27-23 @ 15:47) (86% - 96%)  Telemetry:   CAPILLARY BLOOD GLUCOSE        I&O's Summary    26 Jul 2023 07:01  -  27 Jul 2023 07:00  --------------------------------------------------------  IN: 0 mL / OUT: 800 mL / NET: -800 mL    27 Jul 2023 07:01  -  27 Jul 2023 16:51  --------------------------------------------------------  IN: 480 mL / OUT: 700 mL / NET: -220 mL        PHYSICAL EXAM:  GENERAL: NAD, well-developed  HEAD:  Atraumatic, Normocephalic  EYES: EOMI, PERRLA, conjunctiva and sclera clear  NECK: Supple, No JVD  CHEST/LUNG: b/l rales  HEART: Regular rate and rhythm; No murmurs, rubs, or gallops  ABDOMEN: Soft, Nontender, Nondistended; Bowel sounds present  EXTREMITIES:  2+ Peripheral Pulses, No clubbing, cyanosis, or edema  PSYCH: AAOx3  NEUROLOGY: non-focal  SKIN: No rashes or lesions    LABS:                        15.2   8.09  )-----------( 171      ( 27 Jul 2023 01:11 )             49.9     07-27    140  |  104  |  13  ----------------------------<  195<H>  4.8   |  26  |  0.60    Ca    9.6      27 Jul 2023 01:11            Urinalysis Basic - ( 27 Jul 2023 01:11 )    Color: x / Appearance: x / SG: x / pH: x  Gluc: 195 mg/dL / Ketone: x  / Bili: x / Urobili: x   Blood: x / Protein: x / Nitrite: x   Leuk Esterase: x / RBC: x / WBC x   Sq Epi: x / Non Sq Epi: x / Bacteria: x

## 2023-07-27 NOTE — CONSULT NOTE ADULT - ASSESSMENT
62y/o  female with known implantable device PPM [employer]a 2018 Covid 19 negative unvaccinated with PMHX of PPM Afib on Eliquis last dose on Monday 7/24/23 AM dose , HTN, Tobacco smoker current 0.5 ppd , CHF and AVB. PT presents with recent increasing fatigue sob with exertion and decrease exercise tolerance. Now s/p AF ablation with Dr. Roca. Post procedure with increasing oxygen requirements and suboptimal response to IV diuretics. She appears grossly volume up and warm on exam.       Cardiac Studies  TTE 7/26: grossly normal appearing LVF, mod RVE, BiAtrial enlargement, mild MR, mod TR, PASP 51mmHg

## 2023-07-27 NOTE — PROGRESS NOTE ADULT - ASSESSMENT
64 yo woman admitted for Afib ablation, post procedure developed diffuse rales and fluid overload.   TTE c/w biventricular HF,   HF consult called,   ptn started on IV diuretics.   will get CTA chest, LE dopplers.   ordered PYP scan to R/O cardiac amyloid.   will get pulm consult.   will get general card consult    - cont Lasix 60 mg q12H IV  - strict Is and Os  - cont Lipitor and Eliquis 64 yo woman admitted for Afib ablation, post procedure developed diffuse rales and fluid overload.   ptn transferred to my service 2/2 acute hypoxic respiratory failure, systolic  R heart failure and diastolic L heart failure.. awaiting HF consult, s/p Afib ablation, now in sinus, on ELiquis. ptn is a chronic smoker, h/o PPM 2/2 AVB. ptn also c/o pleuritic CP " ever since i woke up from anesthesia" ptn states she is on home O2,, 2LNC. She is not on any COPD meds/inhalers, has a pulmonologist, smokes 1 PPD x50 yrs.  ptn is noted to have LE erythema, she has tend over LE    A/P  TTE c/w biventricular HF,   CTA chest, LE dopplers  start Abx for b/l LE cellulitis  HF consult called, general card consult called, pulm called  ptn started on IV diuretics, IV medrol 40 q12 , symbicort and spiriva, duonebs  get urgent CXR  ordered PYP scan to R/O cardiac amyloid when able to lie flat  GI ppx w PPI      - cont Lasix 60 mg q12H IV  - strict Is and Os  - cont Lipitor and Eliquis

## 2023-07-27 NOTE — DISCHARGE NOTE PROVIDER - NSDCCPCAREPLAN_GEN_ALL_CORE_FT
PRINCIPAL DISCHARGE DIAGNOSIS  Diagnosis: Chronic atrial fibrillation  Assessment and Plan of Treatment: Continue with your cardiologist and primary care MD. Continue your current medications. Call your physician for palpitations, feelings of rapid heart beat, lightheadedness, or dizziness. Continue Eliquis as prescribed.      SECONDARY DISCHARGE DIAGNOSES  Diagnosis: HTN (hypertension)  Assessment and Plan of Treatment: Continue with your blood pressure medications; eat a heart healthy diet with low salt diet; exercise regularly (consult with your physician or cardiologist first); maintain a heart healthy weight; if you smoke - quit (A resource to help you stop smoking is the [facility] [employer] for Tobacco Control – phone number [phone].); include healthy ways to manage stress. Continue to follow with your primary care physician or cardiologist.    Diagnosis: HLD (hyperlipidemia)  Assessment and Plan of Treatment: Continue with your cholesterol medications. Eat a heart healthy diet that is low in saturated fats and salt, and includes whole grains, fruits, vegetables and lean protein; exercise regularly (consult with your physician or cardiologist first); maintain a heart healthy weight; if you smoke - quit (A resource to help you stop smoking is the [facility] [employer] for Tobacco Control – phone number [phone].). Continue to follow with your primary physician or cardiologist.     PRINCIPAL DISCHARGE DIAGNOSIS  Diagnosis: Chronic atrial fibrillation  Assessment and Plan of Treatment: Continue with your cardiologist and primary care MD. Continue your current medications. Call your physician for palpitations, feelings of rapid heart beat, lightheadedness, or dizziness. Continue Eliquis as prescribed.      SECONDARY DISCHARGE DIAGNOSES  Diagnosis: HTN (hypertension)  Assessment and Plan of Treatment: Continue with your blood pressure medications; eat a heart healthy diet with low salt diet; exercise regularly (consult with your physician or cardiologist first); maintain a heart healthy weight; if you If you are on medication to help you quit smoking, be sure to take it as prescribed. Find healthy ways to deal with stress, such as exercise (check with your healthcare provider first), deep breathing, meditation, or enjoyable healthy hobbies.  Avoid situations that may cause you to smoke a cigarette.  Look for help with quitting; you are not alone. A resource to help you stop smoking is the [facility] for Tobacco Control – phone number 833-735-8512. - quit (A resource to help you stop smoking is the [facility] [employer] [facility] – phone number 337-684-8879.); include healthy ways to manage stress. Continue to follow with your primary care physician or cardiologist.  Your bumex was decreased to 2mg due to hypotension prior to discharge.   Please monitor your salt intake as it can lead to retaining water.   Continue spirlactone as instructed  Monitor your daily weight  Follow up with cardiology/PCP outpatient to uptitrate bumex if needed    Diagnosis: HLD (hyperlipidemia)  Assessment and Plan of Treatment: Continue with your cholesterol medications. Eat a heart healthy diet that is low in saturated fats and salt, and includes whole grains, fruits, vegetables and lean protein; exercise regularly (consult with your physician or cardiologist first); maintain a heart healthy weight; if you smoke - quit (A resource to help you stop smoking is the [facility] for Tobacco Control – phone number [phone].). Continue to follow with your primary physician or cardiologist.     PRINCIPAL DISCHARGE DIAGNOSIS  Diagnosis: Chronic atrial fibrillation  Assessment and Plan of Treatment: Continue with your cardiologist and primary care MD. Continue your current medications. Call your physician for palpitations, feelings of rapid heart beat, lightheadedness, or dizziness. Continue Eliquis as prescribed.      SECONDARY DISCHARGE DIAGNOSES  Diagnosis: HTN (hypertension)  Assessment and Plan of Treatment: Continue with your blood pressure medications; eat a heart healthy diet with low salt diet; exercise regularly (consult with your physician or cardiologist first); maintain a heart healthy weight; if you If you are on medication to help you quit smoking, be sure to take it as prescribed. Find healthy ways to deal with stress, such as exercise (check with your healthcare provider first), deep breathing, meditation, or enjoyable healthy hobbies.  Avoid situations that may cause you to smoke a cigarette.  Look for help with quitting; you are not alone. A resource to help you stop smoking is the [facility] for Tobacco Control – phone number [phone]. - quit (A resource to help you stop smoking is the [facility] for Tobacco Control – phone number [phone].); include healthy ways to manage stress. Continue to follow with your primary care physician or cardiologist.  Your bumex was decreased to 2mg due to hypotension prior to discharge.   Please monitor your salt intake as it can lead to retaining water.   Continue spirlactone as instructed  Monitor your daily weight  Follow up with cardiology/PCP outpatient to uptitrate bumex if needed  Discharged with DESHAUN stockings    Diagnosis: HLD (hyperlipidemia)  Assessment and Plan of Treatment: Continue with your cholesterol medications. Eat a heart healthy diet that is low in saturated fats and salt, and includes whole grains, fruits, vegetables and lean protein; exercise regularly (consult with your physician or cardiologist first); maintain a heart healthy weight; if you smoke - quit (A resource to help you stop smoking is the [facility] for Tobacco Control – phone number [phone].). Continue to follow with your primary physician or cardiologist.

## 2023-07-27 NOTE — PROGRESS NOTE ADULT - SUBJECTIVE AND OBJECTIVE BOX
HPI:  This is a 62y/o  female with known implantable device PPM [employer] 2018 Covid 19 negative unvaccinated with PMHX of PPM Afib on Eliquis last dose on 23 AM dose , HTN, Tobacco smoker current 0.5 ppd , CHF and AVB. PT presents with recent increasing fatigue sob with exertion and decrease exercise tolerance. Now referred for Afib Ablation with Dr. Roca. Currently no acute distress noted no palpitations noted.   Pt Cardiologist Dr. Serna ( [facility] )     Patient is a 63y old  Female who presents with a chief complaint of         Allergies    No Known Allergies    Intolerances        Medications:  apixaban 5 milliGRAM(s) Oral every 12 hours  furosemide   Injectable 20 milliGRAM(s) IV Push once  ipratropium  for Nebulization. 500 MICROGram(s) Nebulizer once PRN  ondansetron Injectable 4 milliGRAM(s) IV Push once      Vitals:  T(C): 36.6 (23 @ 19:10), Max: 36.7 (23 @ 10:14)  HR: 94 (23 @ 19:40) (64 - 100)  BP: 120/54 (23 @ 19:40) (114/54 - 176/75)  BP(mean): 67 (23 @ 19:40) (67 - 70)  RR: 18 (23 @ 19:40) (15 - 19)  SpO2: 90% (23 @ 19:40) (88% - 98%)  Wt(kg): --  Daily Height in cm: 162.56 (2023 13:29)    Daily Weight in k.6 (2023 10:14)  I&O's Summary    2023 07:01  -  2023 01:36  --------------------------------------------------------  IN: 0 mL / OUT: 800 mL / NET: -800 mL          Physical Exam:  Appearance: Normal  Eyes: PERRL, EOMI  HENT: Normal oral muscosa, NC/AT  Cardiovascular: S1S2, RRR, No M/R/G, no JVD, No Lower extremity edema  Procedural Access Site: No hematoma, Non-tender to palpation, 2+ pulse, No bruit, No Ecchymosis  Respiratory: Clear to auscultation bilaterally  Gastrointestinal: Soft, Non tender, Normal Bowel Sounds  Musculoskeletal: No clubbing, No joint deformity   Neurologic: Non-focal  Lymphatic: No lymphadenopathy  Psychiatry: AAOx3, Mood & affect appropriate  Skin: No rashes, No ecchymoses, No cyanosis        142  |  103  |  10  ----------------------------<  97  4.6   |  29  |  0.47<L>    Ca    10.3      2023 10:59              Lipid panel   Hgb A1c                         15.2   8.09  )-----------( 171      ( 2023 01:11 )             49.9         ECG: SV with right BBB, 61 bpm

## 2023-07-27 NOTE — DISCHARGE NOTE PROVIDER - NSDCFUADDAPPT_GEN_ALL_CORE_FT
APPTS ARE READY TO BE MADE: [x ] YES    Best Family or Patient Contact (if needed):    Additional Information about above appointments (if needed):    1:   2:   3:     Other comments or requests:    APPTS ARE READY TO BE MADE: [x ] YES    Best Family or Patient Contact (if needed):    Additional Information about above appointments (if needed):    1:   2:   3:     Other comments or requests:   Patient was previously scheduled with Dr. Hermilo Roca on 09/12/2023 9:30AM at [address] [place] [address]   APPTS ARE READY TO BE MADE: [x ] YES    Best Family or Patient Contact (if needed):    Additional Information about above appointments (if needed):    1:   2:   3:     Other comments or requests:   Patient was previously scheduled with Dr. Hermilo Roca on 09/12/2023 9:30AM at [address] [place] [address].   APPTS ARE READY TO BE MADE: [x ] YES    Best Family or Patient Contact (if needed):    Additional Information about above appointments (if needed):    1:   2:   3:     Other comments or requests:   Patient was previously scheduled with Dr. Hermilo Roca on 09/12/2023 9:30AM at [address] [place] [address].      Patient was provided with follow up request details for Dr. Mary Gallegos and was advised to call to schedule follow up within specified time frame.      Patient was scheduled with ABBIE Gonzalez on 08/04/2023 12:00PM at [address] [place] [address] through the provider's office.    Patient was scheduled with Dr. Marycarmen Luong on 08/08/2023 12:15pm at [address] [facility], [place] [address].        APPTS ARE READY TO BE MADE: [x ] YES    Best Family or Patient Contact (if needed):    Additional Information about above appointments (if needed):    1:   2:   3:     Other comments or requests:   Patient was previously scheduled with Dr. Hermilo Roca on 09/12/2023 9:30AM at [address] [place] [address].      Patient was provided with follow up request details for Dr. Mary Gallegos and was advised to call to schedule follow up within specified time frame.      Patient was scheduled with ABBIE Gonzalez on 08/04/2023 12:00PM at [address] [place] [address] through the provider's office.    Patient was scheduled with Dr. Marycarmen Luong on 08/08/2023 12:15pm at [address] [facility], [address], [place] [address].

## 2023-07-27 NOTE — CHART NOTE - NSCHARTNOTEFT_GEN_A_CORE
Pt's in bed alert O x3,   States I feel tired,."     /63  HR 60's paced   PO low 90's (90-91%)  Lung sounds Bilateral LL crackles  with bilateral LE ++ not pitting edema    Plan:   Continue to monitor  Lasix 40mg IVPx1 now (she had 20mg this am)  TTE pending  Strict I&O  HF team consult     Dr. Roca was at bedside discussed plan with pt and daughter. Pt's in bed alert O x3,   States "I feel tired,."     /63  HR 60's paced   PO low 90's (90-91%)  Lung sounds Bilateral LL crackles  with bilateral LE ++ not pitting edema  Rt groin procedure site: benign no hematoma or ecchymosis    Plan:   Continue to monitor  Lasix 40mg IVPx1 now (she had 20mg this am)  TTE pending  Strict I&O  HF team consult     Dr. Roca was at bedside discussed plan with pt and daughter. Pt's in bed alert O x3,   States "I feel tired,."     /64  HR 60's paced   PO low 90's (90-91%)  Lung sounds Bilateral LL crackles  with bilateral LE ++ not pitting edema  Rt groin procedure site: benign no hematoma or ecchymosis    Plan:   Continue to monitor  Lasix 40mg IVPx1 now (she had 20mg this am)  TTE pending  Strict I&O  HF team consult     Dr. Roca was at bedside discussed plan with pt and daughter.      Addendum to above note @ 15:20  Pt was evaluated by HF team   Rec:   Furosemide 60 mg BID   pBNP, TFT in am  Strict I & O's   Amyloid study     Pt's daughter understood.

## 2023-07-27 NOTE — CONSULT NOTE ADULT - SUBJECTIVE AND OBJECTIVE BOX
ADVANCED HEART FAILURE & TRANSPLANT  - PROGRESS NOTE  *To reach the NS2 Team from 8am to 5pm (MON-FRI), please call [phone].   _______________________________________________________________________________________________________     ADVANCED HEART FAILURE & TRANSPLANT  - PROGRESS NOTE  *To reach the NS2 Team from 8am to 5pm (MON-FRI), please call [phone].   _______________________________________________________________________________________________________    HPI:  This is a 62y/o  female with known implantable device PPM [facility] 2018 Covid 19 negative unvaccinated with PMHX of PPM Afib on Eliquis last dose on Monday 7/24/23 AM dose , HTN, Tobacco smoker current 0.5 ppd , CHF and AVB. PT presents with recent increasing fatigue sob with exertion and decrease exercise tolerance. Now referred for Afib Ablation with Dr. Roca. Currently no acute distress noted no palpitations noted.   Pt Cardiologist Dr. Serna ( [facility] )       Her HPI began in 2018 after hospitalization for ? AVB with subsequent PPM implant. Had since followed with primary cardiologist Dr. Serna and  reports 3 months of persistent orthopnea and LE edema, with inconsistent diuretic adherence. Of note, reports recent ischemic eval with CTA which reportedly revealed no coronary occulusion      HPI:  PAST MEDICAL & SURGICAL HISTORY:  Chronic atrial fibrillation  HTN (hypertension)  Acute on chronic systolic congestive heart failure  Pacemaker  History of abdominal hernia    Active Smoke 1 PPD x 50years  Denies ETOH use    FAMILY HISTORY:  FH: myocardial infarction (Mother)    FH: CHF (congestive heart failure) (Father)    Home Medications:  Eliquis 5 mg oral tablet: 1 orally 2 times a day (26 Jul 2023 11:39)  Lasix 40 mg oral tablet: 1 orally once a day (26 Jul 2023 11:42)  simvastatin 20 mg oral tablet: 1 orally once a day (26 Jul 2023 11:42)    Medications:  acetaminophen Tablet .. 650 milliGRAM(s) Oral every 6 hours PRN  albuterol/ipratropium for Nebulization 3 milliLiter(s) Nebulizer every 6 hours  apixaban 5 milliGRAM(s) Oral every 12 hours  atorvastatin 40 milliGRAM(s) Oral at bedtime  budesonide 160 MICROgram(s)/formoterol 4.5 MICROgram(s) Inhaler 2 Puff(s) Inhalation two times a day  ceFAZolin   IVPB      furosemide   Injectable 60 milliGRAM(s) IV Push every 12 hours  ipratropium  for Nebulization. 500 MICROGram(s) Nebulizer once PRN  methylPREDNISolone sodium succinate Injectable 40 milliGRAM(s) IV Push every 12 hours  nicotine - 7 mG/24Hr(s) Patch 1 Patch Transdermal daily  tiotropium 2.5 MICROgram(s) Inhaler 2 Puff(s) Inhalation daily    Review of systems:  10 point review of systems completed and are negative unless noted in HPI    Vitals:  T(C): 36.4 (07-27-23 @ 17:04), Max: 36.7 (07-27-23 @ 07:43)  HR: 72 (07-27-23 @ 17:04) (60 - 100)  BP: 126/77 (07-27-23 @ 17:04) (100/53 - 176/75)  BP(mean): 71 (07-27-23 @ 15:47) (65 - 84)  RR: 18 (07-27-23 @ 17:04) (15 - 19)  SpO2: 93% (07-27-23 @ 17:04) (86% - 96%)    Weight (kg): 81.6 (07-26 @ 13:29)    I&O's Summary    26 Jul 2023 07:01  -  27 Jul 2023 07:00  --------------------------------------------------------  IN: 0 mL / OUT: 800 mL / NET: -800 mL    27 Jul 2023 07:01  -  27 Jul 2023 17:24  --------------------------------------------------------  IN: 480 mL / OUT: 700 mL / NET: -220 mL    Physical Exam:  Appearance: No Acute Distress  HEENT: PERRL  Neck: JVP moderately elevated  Cardiovascular: Normal S1 S2, No murmurs/rubs/gallops, warm peripherally   Respiratory: Clear to auscultation bilaterally  Gastrointestinal: Soft, Non-tender	  Skin: No cyanosis	  Neurologic: Non-focal  Extremities: No LE edema  Psychiatry: A & O x 3, Mood & affect appropriate    Labs:                     15.2   8.09  )-----------( 171      ( 27 Jul 2023 01:11 )             49.9     07-27    140  |  104  |  13  ----------------------------<  195<H>  4.8   |  26  |  0.60    Ca    9.6      27 Jul 2023 01:11

## 2023-07-27 NOTE — CONSULT NOTE ADULT - NS ATTEND AMEND GEN_ALL_CORE FT
Briefly, 64y/o F w/ h/o afib (since 50s; on AC), HFpEF, heart block (2018) s/p Abbott dual chamber PPM c/b ? cardiac arrest requiring shock, aflutter, active tobacco use (0.5 ppd x 30 years), HTN presented on 7/26 for elective atypical flutter ablation. Underwent ablation but was noted to be fluid overloaded with progressive hypoxia. TTE showed EF 65%, severe biatrial enlargement, mod TR, mod RV dysfunction, dilated IVC. Of note, had been ruled out for sarcoid with PET scan in April. Does note numbness of her hands and possibly told she had carpal tunnel syndrome. On exam, mildly lethargic, JVP approx 12-14 with v waves and HJR, RRR, no m/r/g appreciated, dec BS b/l with crackles, distended abdomen, trace pedal edema. Labs reivewed - K 4.8, BUN/Cr 13/0.6, Hb 15. CT coronaries negative 12/22. Interrogation noted 48% V pacing. Overall stage C HF, NYHA class III with volume overload. Unclear etiology of HFpEF but would exclude amyloidosis and would consider genetic testing given afib at young age and severe biatrial scarring.   - increase lasix as above; goal net negative  - standing weights daily  - c/w AC   - check BNP  - check TSH  - check serum light chains and consider Tc-Pyp  - discussed disease process with patient Briefly, 62y/o F w/ h/o afib (since 50s; on AC), HFpEF, heart block (2018) s/p Abbott dual chamber PPM c/b ? cardiac arrest requiring shock (per daughter), aflutter, active tobacco use (0.5 ppd x 30 years), HTN presented on 7/26 for elective atypical flutter ablation. Underwent ablation but was noted to be fluid overloaded with progressive hypoxia. TTE showed EF 65%, severe biatrial enlargement, mod TR, mod RV dysfunction, dilated IVC. Of note, had been ruled out for sarcoid with PET scan in April. Does note numbness of her hands and possibly told she had carpal tunnel syndrome. On exam, mildly lethargic, JVP approx 12-14 with v waves and HJR, RRR, no m/r/g appreciated, dec BS b/l with crackles, distended abdomen, trace pedal edema. Labs reivewed - K 4.8, BUN/Cr 13/0.6, Hb 15. CT coronaries negative 12/22. Interrogation noted 48% V pacing. Overall stage C HF, NYHA class III with volume overload. Unclear etiology of HFpEF but would exclude amyloidosis given severe biatrial enlargement and neuropathy (low suspicion) and would consider genetic testing given afib at young age and severe biatrial scarring.   - increase lasix as above; goal net negative  - appears to have significant ectopy; would determine burden and if significant, consider pharmacologic vs. ablation; would benefit from cardiac MRI (if possible)   - standing weights daily  - c/w AC   - check BNP  - check TSH  - check serum light chains and consider Tc-Pyp  - discussed disease process with patient

## 2023-07-28 DIAGNOSIS — R73.9 HYPERGLYCEMIA, UNSPECIFIED: ICD-10-CM

## 2023-07-28 LAB
A1C WITH ESTIMATED AVERAGE GLUCOSE RESULT: 5.6 % — SIGNIFICANT CHANGE UP (ref 4–5.6)
ALBUMIN SERPL ELPH-MCNC: 4 G/DL — SIGNIFICANT CHANGE UP (ref 3.3–5)
ALP SERPL-CCNC: 105 U/L — SIGNIFICANT CHANGE UP (ref 40–120)
ALT FLD-CCNC: 10 U/L — SIGNIFICANT CHANGE UP (ref 10–45)
ANION GAP SERPL CALC-SCNC: 13 MMOL/L — SIGNIFICANT CHANGE UP (ref 5–17)
ANION GAP SERPL CALC-SCNC: 16 MMOL/L — SIGNIFICANT CHANGE UP (ref 5–17)
AST SERPL-CCNC: 26 U/L — SIGNIFICANT CHANGE UP (ref 10–40)
BILIRUB DIRECT SERPL-MCNC: 0.2 MG/DL — SIGNIFICANT CHANGE UP (ref 0–0.3)
BILIRUB INDIRECT FLD-MCNC: 0.3 MG/DL — SIGNIFICANT CHANGE UP (ref 0.2–1)
BILIRUB SERPL-MCNC: 0.5 MG/DL — SIGNIFICANT CHANGE UP (ref 0.2–1.2)
BUN SERPL-MCNC: 20 MG/DL — SIGNIFICANT CHANGE UP (ref 7–23)
BUN SERPL-MCNC: 22 MG/DL — SIGNIFICANT CHANGE UP (ref 7–23)
CALCIUM SERPL-MCNC: 10.1 MG/DL — SIGNIFICANT CHANGE UP (ref 8.4–10.5)
CALCIUM SERPL-MCNC: 8.8 MG/DL — SIGNIFICANT CHANGE UP (ref 8.4–10.5)
CHLORIDE SERPL-SCNC: 86 MMOL/L — LOW (ref 96–108)
CHLORIDE SERPL-SCNC: 96 MMOL/L — SIGNIFICANT CHANGE UP (ref 96–108)
CO2 SERPL-SCNC: 25 MMOL/L — SIGNIFICANT CHANGE UP (ref 22–31)
CO2 SERPL-SCNC: 28 MMOL/L — SIGNIFICANT CHANGE UP (ref 22–31)
CREAT SERPL-MCNC: 0.52 MG/DL — SIGNIFICANT CHANGE UP (ref 0.5–1.3)
CREAT SERPL-MCNC: 0.52 MG/DL — SIGNIFICANT CHANGE UP (ref 0.5–1.3)
EGFR: 105 ML/MIN/1.73M2 — SIGNIFICANT CHANGE UP
EGFR: 105 ML/MIN/1.73M2 — SIGNIFICANT CHANGE UP
ESTIMATED AVERAGE GLUCOSE: 114 MG/DL — SIGNIFICANT CHANGE UP (ref 68–114)
GLUCOSE BLDC GLUCOMTR-MCNC: 128 MG/DL — HIGH (ref 70–99)
GLUCOSE BLDC GLUCOMTR-MCNC: 163 MG/DL — HIGH (ref 70–99)
GLUCOSE BLDC GLUCOMTR-MCNC: 201 MG/DL — HIGH (ref 70–99)
GLUCOSE SERPL-MCNC: 163 MG/DL — HIGH (ref 70–99)
GLUCOSE SERPL-MCNC: 442 MG/DL — HIGH (ref 70–99)
HCT VFR BLD CALC: 48.1 % — HIGH (ref 34.5–45)
HGB BLD-MCNC: 14.5 G/DL — SIGNIFICANT CHANGE UP (ref 11.5–15.5)
INR BLD: 1.89 RATIO — HIGH (ref 0.85–1.18)
INTERPRETATION SERPL IFE-IMP: SIGNIFICANT CHANGE UP
KAPPA LC SER QL IFE: 2.4 MG/DL — HIGH (ref 0.33–1.94)
KAPPA/LAMBDA FREE LIGHT CHAIN RATIO, SERUM: 1.36 RATIO — SIGNIFICANT CHANGE UP (ref 0.26–1.65)
LAMBDA LC SER QL IFE: 1.77 MG/DL — SIGNIFICANT CHANGE UP (ref 0.57–2.63)
MAGNESIUM SERPL-MCNC: 1.7 MG/DL — SIGNIFICANT CHANGE UP (ref 1.6–2.6)
MCHC RBC-ENTMCNC: 29.5 PG — SIGNIFICANT CHANGE UP (ref 27–34)
MCHC RBC-ENTMCNC: 30.1 GM/DL — LOW (ref 32–36)
MCV RBC AUTO: 98 FL — SIGNIFICANT CHANGE UP (ref 80–100)
MELD SCORE WITH DIALYSIS: 31 POINTS — SIGNIFICANT CHANGE UP
MELD SCORE WITHOUT DIALYSIS: 23 POINTS — SIGNIFICANT CHANGE UP
MRSA PCR RESULT.: SIGNIFICANT CHANGE UP
NRBC # BLD: 0 /100 WBCS — SIGNIFICANT CHANGE UP (ref 0–0)
NT-PROBNP SERPL-SCNC: 4216 PG/ML — HIGH (ref 0–300)
PHOSPHATE SERPL-MCNC: 2.3 MG/DL — LOW (ref 2.5–4.5)
PLATELET # BLD AUTO: 120 K/UL — LOW (ref 150–400)
POTASSIUM SERPL-MCNC: 3.9 MMOL/L — SIGNIFICANT CHANGE UP (ref 3.5–5.3)
POTASSIUM SERPL-MCNC: 4.5 MMOL/L — SIGNIFICANT CHANGE UP (ref 3.5–5.3)
POTASSIUM SERPL-SCNC: 3.9 MMOL/L — SIGNIFICANT CHANGE UP (ref 3.5–5.3)
POTASSIUM SERPL-SCNC: 4.5 MMOL/L — SIGNIFICANT CHANGE UP (ref 3.5–5.3)
PROT ?TM UR-MCNC: 33 MG/DL — HIGH (ref 0–12)
PROT SERPL-MCNC: 7 G/DL — SIGNIFICANT CHANGE UP (ref 6–8.3)
PROTHROM AB SERPL-ACNC: 20.4 SEC — HIGH (ref 9.5–13)
RBC # BLD: 4.91 M/UL — SIGNIFICANT CHANGE UP (ref 3.8–5.2)
RBC # FLD: 13.1 % — SIGNIFICANT CHANGE UP (ref 10.3–14.5)
S AUREUS DNA NOSE QL NAA+PROBE: SIGNIFICANT CHANGE UP
SODIUM SERPL-SCNC: 127 MMOL/L — LOW (ref 135–145)
SODIUM SERPL-SCNC: 137 MMOL/L — SIGNIFICANT CHANGE UP (ref 135–145)
T3 SERPL-MCNC: 31 NG/DL — LOW (ref 80–200)
T4 AB SER-ACNC: 5.4 UG/DL — SIGNIFICANT CHANGE UP (ref 4.6–12)
TSH SERPL-MCNC: 0.43 UIU/ML — SIGNIFICANT CHANGE UP (ref 0.27–4.2)
WBC # BLD: 10.51 K/UL — HIGH (ref 3.8–10.5)
WBC # FLD AUTO: 10.51 K/UL — HIGH (ref 3.8–10.5)

## 2023-07-28 PROCEDURE — 93970 EXTREMITY STUDY: CPT | Mod: 26

## 2023-07-28 PROCEDURE — 93010 ELECTROCARDIOGRAM REPORT: CPT

## 2023-07-28 PROCEDURE — 71275 CT ANGIOGRAPHY CHEST: CPT | Mod: 26

## 2023-07-28 PROCEDURE — 99222 1ST HOSP IP/OBS MODERATE 55: CPT

## 2023-07-28 RX ORDER — INSULIN GLARGINE 100 [IU]/ML
12 INJECTION, SOLUTION SUBCUTANEOUS AT BEDTIME
Refills: 0 | Status: DISCONTINUED | OUTPATIENT
Start: 2023-07-28 | End: 2023-07-30

## 2023-07-28 RX ORDER — INSULIN LISPRO 100/ML
7 VIAL (ML) SUBCUTANEOUS
Refills: 0 | Status: DISCONTINUED | OUTPATIENT
Start: 2023-07-28 | End: 2023-07-28

## 2023-07-28 RX ORDER — FUROSEMIDE 40 MG
80 TABLET ORAL EVERY 12 HOURS
Refills: 0 | Status: DISCONTINUED | OUTPATIENT
Start: 2023-07-28 | End: 2023-07-29

## 2023-07-28 RX ORDER — HYDRALAZINE HCL 50 MG
10 TABLET ORAL THREE TIMES A DAY
Refills: 0 | Status: DISCONTINUED | OUTPATIENT
Start: 2023-07-28 | End: 2023-08-02

## 2023-07-28 RX ORDER — INSULIN LISPRO 100/ML
VIAL (ML) SUBCUTANEOUS
Refills: 0 | Status: DISCONTINUED | OUTPATIENT
Start: 2023-07-28 | End: 2023-08-03

## 2023-07-28 RX ORDER — CHLORHEXIDINE GLUCONATE 213 G/1000ML
1 SOLUTION TOPICAL
Refills: 0 | Status: DISCONTINUED | OUTPATIENT
Start: 2023-07-28 | End: 2023-08-03

## 2023-07-28 RX ORDER — INSULIN LISPRO 100/ML
3 VIAL (ML) SUBCUTANEOUS
Refills: 0 | Status: DISCONTINUED | OUTPATIENT
Start: 2023-07-28 | End: 2023-07-29

## 2023-07-28 RX ORDER — INSULIN GLARGINE 100 [IU]/ML
30 INJECTION, SOLUTION SUBCUTANEOUS AT BEDTIME
Refills: 0 | Status: DISCONTINUED | OUTPATIENT
Start: 2023-07-28 | End: 2023-07-28

## 2023-07-28 RX ADMIN — Medication 1 PATCH: at 12:30

## 2023-07-28 RX ADMIN — Medication 650 MILLIGRAM(S): at 04:32

## 2023-07-28 RX ADMIN — TIOTROPIUM BROMIDE 2 PUFF(S): 18 CAPSULE ORAL; RESPIRATORY (INHALATION) at 11:28

## 2023-07-28 RX ADMIN — APIXABAN 5 MILLIGRAM(S): 2.5 TABLET, FILM COATED ORAL at 08:21

## 2023-07-28 RX ADMIN — Medication 10 MILLIGRAM(S): at 14:15

## 2023-07-28 RX ADMIN — Medication 40 MILLIGRAM(S): at 05:05

## 2023-07-28 RX ADMIN — BUDESONIDE AND FORMOTEROL FUMARATE DIHYDRATE 2 PUFF(S): 160; 4.5 AEROSOL RESPIRATORY (INHALATION) at 17:19

## 2023-07-28 RX ADMIN — Medication 3 MILLILITER(S): at 05:05

## 2023-07-28 RX ADMIN — Medication 10 MILLIGRAM(S): at 21:59

## 2023-07-28 RX ADMIN — Medication 60 MILLIGRAM(S): at 05:04

## 2023-07-28 RX ADMIN — Medication 100 MILLIGRAM(S): at 13:07

## 2023-07-28 RX ADMIN — Medication 650 MILLIGRAM(S): at 05:32

## 2023-07-28 RX ADMIN — Medication 40 MILLIGRAM(S): at 17:16

## 2023-07-28 RX ADMIN — Medication 1: at 12:04

## 2023-07-28 RX ADMIN — Medication 3 MILLILITER(S): at 17:17

## 2023-07-28 RX ADMIN — Medication 100 MILLIGRAM(S): at 22:00

## 2023-07-28 RX ADMIN — ATORVASTATIN CALCIUM 40 MILLIGRAM(S): 80 TABLET, FILM COATED ORAL at 21:59

## 2023-07-28 RX ADMIN — Medication 3 MILLILITER(S): at 11:29

## 2023-07-28 RX ADMIN — Medication 1 PATCH: at 08:31

## 2023-07-28 RX ADMIN — Medication 1 PATCH: at 11:29

## 2023-07-28 RX ADMIN — INSULIN GLARGINE 12 UNIT(S): 100 INJECTION, SOLUTION SUBCUTANEOUS at 21:59

## 2023-07-28 RX ADMIN — Medication 80 MILLIGRAM(S): at 17:16

## 2023-07-28 RX ADMIN — APIXABAN 5 MILLIGRAM(S): 2.5 TABLET, FILM COATED ORAL at 21:22

## 2023-07-28 RX ADMIN — BUDESONIDE AND FORMOTEROL FUMARATE DIHYDRATE 2 PUFF(S): 160; 4.5 AEROSOL RESPIRATORY (INHALATION) at 05:05

## 2023-07-28 RX ADMIN — Medication 100 MILLIGRAM(S): at 05:04

## 2023-07-28 RX ADMIN — PANTOPRAZOLE SODIUM 40 MILLIGRAM(S): 20 TABLET, DELAYED RELEASE ORAL at 05:05

## 2023-07-28 RX ADMIN — Medication 1 PATCH: at 19:30

## 2023-07-28 NOTE — CONSULT NOTE ADULT - TIME BILLING
Patient seen and examined.  Agree with above NP note.  a/p  64 y/o  female w PMH of AVB s/p PPM [facility] 2018, afib on Eliquis, HTN, tobacco smoker, HFpEF,  presents with recent increasing fatigue , COATES and decrease exercise tolerance. Now referred for Afib Ablation.  she is now SP AF ablation and admitted with acute CHF     # Afib s/p afib ablation on 7/26  -eps f/u  -cont a/c    #acute on chronic Diastolic CHF   -cv stable, remains overloaded  -Hf f/u  -continue lasix 80 mg IVP BID   -recent PET/CT in April 2023 w/o evidence of Sarcoid.   -once clinically improved and near euvolemia will need ischemic evaluation  -will hold eliquis likely over weekend and start iv heparin pending clinical course for poss cath
Review of medical record,coordination of care and did not include time spent teaching.

## 2023-07-28 NOTE — CONSULT NOTE ADULT - SUBJECTIVE AND OBJECTIVE BOX
Alin Santizo  PGY-2 Resident Physician     Patient is a 63y old  Female who presents with a chief complaint of Atrial fib (2023 01:42)      C/S Reason:     HPI:     SUBJECTIVE / OVERNIGHT EVENTS:    MEDICATIONS  (STANDING):  albuterol/ipratropium for Nebulization 3 milliLiter(s) Nebulizer every 6 hours  apixaban 5 milliGRAM(s) Oral every 12 hours  atorvastatin 40 milliGRAM(s) Oral at bedtime  budesonide 160 MICROgram(s)/formoterol 4.5 MICROgram(s) Inhaler 2 Puff(s) Inhalation two times a day  ceFAZolin   IVPB      ceFAZolin   IVPB 1000 milliGRAM(s) IV Intermittent every 8 hours  furosemide   Injectable 60 milliGRAM(s) IV Push every 12 hours  insulin glargine Injectable (LANTUS) 30 Unit(s) SubCutaneous at bedtime  insulin lispro (ADMELOG) corrective regimen sliding scale   SubCutaneous three times a day before meals  insulin lispro Injectable (ADMELOG) 7 Unit(s) SubCutaneous three times a day before meals  methylPREDNISolone sodium succinate Injectable 40 milliGRAM(s) IV Push every 12 hours  nicotine -   7 mG/24Hr(s) Patch 1 Patch Transdermal daily  pantoprazole    Tablet 40 milliGRAM(s) Oral before breakfast  tiotropium 2.5 MICROgram(s) Inhaler 2 Puff(s) Inhalation daily    MEDICATIONS  (PRN):  acetaminophen     Tablet .. 650 milliGRAM(s) Oral every 6 hours PRN Temp greater or equal to 38C (100.4F), Moderate Pain (4 - 6)  ipratropium  for Nebulization. 500 MICROGram(s) Nebulizer once PRN Shortness of Breath    Allergies    No Known Allergies    Intolerances        Vital Signs Last 24 Hrs  T(C): 36.6 (2023 08:26), Max: 36.6 (2023 12:12)  T(F): 97.9 (2023 08:26), Max: 97.9 (2023 12:12)  HR: 72 (2023 08:26) (71 - 77)  BP: 121/63 (2023 08:26) (102/59 - 126/77)  BP(mean): 71 (2023 15:47) (71 - 71)  RR: 18 (2023 08:26) (17 - 18)  SpO2: 92% (2023 08:26) (86% - 93%)    Parameters below as of 2023 08:26  Patient On (Oxygen Delivery Method): nasal cannula  O2 Flow (L/min): 4    Daily     Daily Weight in k.7 (2023 08:32)  I&O's Summary    2023 07:01  -  2023 07:00  --------------------------------------------------------  IN: 480 mL / OUT: 2500 mL / NET: -2020 mL        Physical Exam  GENERAL: NAD. Well-developed.  NEUROLOGICAL: A&O x 4. CN2-12. Strength, Sensation, ROM wnl. Brachial, ankle, babinski reflex wnl. Cerebellar signs (FTN) wnl. Gait appreciated.    HEAD: Atraumatic, normocephalic,   EYES: EOMI, PERRLA, No conjunctival or scleral injection.  NASAL/ORAL: Oral mucosa with moist membranes. Normal dentition. No pharyngeal injection or exudates.                    NECK: No lymphadenopathy. Supple, symmetric and without tracheal deviation.   CARDIAC: RRR w/ normal S1 & S2. No murmurs, rubs. & gallops. Radial & dorsal pedis pulses intact. No carotid bruits.   PULMONARY: CTA b/l w/o accessory muscle use.   GI: Soft, NT, ND, no rebound, no guarding. NABS in 4 quads. No palpable masses. No hepatosplenomegaly. No hernia visualized. No excessive scarring. Negative briones , psoas, obturator signs.   RENAL: No lower extremity edema. No CVA tenderness.   MSK/DERM:  Examination of the (head/neck/spine/ribs/pelvis, RUE, LUE, RLE, LLE) without misalignment.  Normal ROM without pain, no spinal tenderness, normal muscle strength/tone. No rashes or ulcers noted; no subcutaneous nodules or induration palpable  PSYCH: Appropriate insight/judgment    DIAGNOSTICS:                         14.5   10.51 )-----------( 120      ( 2023 07:08 )             48.1     Hgb Trend: 14.5<--, 15.2<--, 14.7<--      127<L>  |  86<L>  |  20  ----------------------------<  442<H>  3.9   |  25  |  0.52    Ca    8.8      2023 06:33  Phos  2.3       Mg     1.7         TPro  7.0  /  Alb  4.0  /  TBili  0.5  /  DBili  0.2  /  AST  26  /  ALT  10  /  AlkPhos  105      CAPILLARY BLOOD GLUCOSE        Creatinine Trend: 0.52<--, 0.60<--, 0.47<--  LIVER FUNCTIONS - ( 2023 06:33 )  Alb: 4.0 g/dL / Pro: 7.0 g/dL / ALK PHOS: 105 U/L / ALT: 10 U/L / AST: 26 U/L / GGT: x           PT/INR - ( 2023 06:33 )   PT: 20.4 sec;   INR: 1.89 ratio               Urinalysis Basic - ( 2023 06:33 )    Color: x / Appearance: x / SG: x / pH: x  Gluc: 442 mg/dL / Ketone: x  / Bili: x / Urobili: x   Blood: x / Protein: x / Nitrite: x   Leuk Esterase: x / RBC: x / WBC x   Sq Epi: x / Non Sq Epi: x / Bacteria: x           Alin Santizo  PGY-2 Resident Physician     Patient is a 63y old  Female who presents with a chief complaint of Atrial fib (2023 01:42)    C/S Reason: SOB on exertion     HPI:   This is a 62y/o  female with known implantable device PPM [facility] 2018 Covid 19 negative unvaccinated with PMHX of PPM Afib on Eliquis last dose on 23 AM dose , HTN, Tobacco smoker current 0.5 ppd , CHF and AVB. PT presents with recent increasing fatigue sob with exertion and decrease exercise tolerance. Now referred for Afib Ablation with Dr. Roca. Currently no acute distress noted no palpitations noted.     SUBJECTIVE / OVERNIGHT EVENTS:    MEDICATIONS  (STANDING):  albuterol/ipratropium for Nebulization 3 milliLiter(s) Nebulizer every 6 hours  apixaban 5 milliGRAM(s) Oral every 12 hours  atorvastatin 40 milliGRAM(s) Oral at bedtime  budesonide 160 MICROgram(s)/formoterol 4.5 MICROgram(s) Inhaler 2 Puff(s) Inhalation two times a day  ceFAZolin   IVPB      ceFAZolin   IVPB 1000 milliGRAM(s) IV Intermittent every 8 hours  furosemide   Injectable 60 milliGRAM(s) IV Push every 12 hours  insulin glargine Injectable (LANTUS) 30 Unit(s) SubCutaneous at bedtime  insulin lispro (ADMELOG) corrective regimen sliding scale   SubCutaneous three times a day before meals  insulin lispro Injectable (ADMELOG) 7 Unit(s) SubCutaneous three times a day before meals  methylPREDNISolone sodium succinate Injectable 40 milliGRAM(s) IV Push every 12 hours  nicotine -   7 mG/24Hr(s) Patch 1 Patch Transdermal daily  pantoprazole    Tablet 40 milliGRAM(s) Oral before breakfast  tiotropium 2.5 MICROgram(s) Inhaler 2 Puff(s) Inhalation daily    MEDICATIONS  (PRN):  acetaminophen     Tablet .. 650 milliGRAM(s) Oral every 6 hours PRN Temp greater or equal to 38C (100.4F), Moderate Pain (4 - 6)  ipratropium  for Nebulization. 500 MICROGram(s) Nebulizer once PRN Shortness of Breath    Allergies    No Known Allergies    Intolerances        Vital Signs Last 24 Hrs  T(C): 36.6 (2023 08:26), Max: 36.6 (2023 12:12)  T(F): 97.9 (2023 08:26), Max: 97.9 (2023 12:12)  HR: 72 (2023 08:26) (71 - 77)  BP: 121/63 (2023 08:26) (102/59 - 126/77)  BP(mean): 71 (2023 15:47) (71 - 71)  RR: 18 (2023 08:26) (17 - 18)  SpO2: 92% (2023 08:26) (86% - 93%)    Parameters below as of 2023 08:26  Patient On (Oxygen Delivery Method): nasal cannula  O2 Flow (L/min): 4    Daily     Daily Weight in k.7 (2023 08:32)  I&O's Summary    2023 07:01  -  2023 07:00  --------------------------------------------------------  IN: 480 mL / OUT: 2500 mL / NET: -2020 mL        Physical Exam  GENERAL: NAD. Well-developed.  NEUROLOGICAL: A&O x 4. CN2-12. Strength, Sensation, ROM wnl. Brachial, ankle, babinski reflex wnl. Cerebellar signs (FTN) wnl. Gait appreciated.    HEAD: Atraumatic, normocephalic,   EYES: EOMI, PERRLA, No conjunctival or scleral injection.  NASAL/ORAL: Oral mucosa with moist membranes. Normal dentition. No pharyngeal injection or exudates.                    NECK: No lymphadenopathy. Supple, symmetric and without tracheal deviation.   CARDIAC: RRR w/ normal S1 & S2. No murmurs, rubs. & gallops. Radial & dorsal pedis pulses intact. No carotid bruits.   PULMONARY: CTA b/l w/o accessory muscle use.   GI: Soft, NT, ND, no rebound, no guarding. NABS in 4 quads. No palpable masses. No hepatosplenomegaly. No hernia visualized. No excessive scarring. Negative briones , psoas, obturator signs.   RENAL: No lower extremity edema. No CVA tenderness.   MSK/DERM:  Examination of the (head/neck/spine/ribs/pelvis, RUE, LUE, RLE, LLE) without misalignment.  Normal ROM without pain, no spinal tenderness, normal muscle strength/tone. No rashes or ulcers noted; no subcutaneous nodules or induration palpable  PSYCH: Appropriate insight/judgment    DIAGNOSTICS:                         14.5   10.51 )-----------( 120      ( 2023 07:08 )             48.1     Hgb Trend: 14.5<--, 15.2<--, 14.7<--      127<L>  |  86<L>  |  20  ----------------------------<  442<H>  3.9   |  25  |  0.52    Ca    8.8      2023 06:33  Phos  2.3       Mg     1.7         TPro  7.0  /  Alb  4.0  /  TBili  0.5  /  DBili  0.2  /  AST  26  /  ALT  10  /  AlkPhos  105      CAPILLARY BLOOD GLUCOSE        Creatinine Trend: 0.52<--, 0.60<--, 0.47<--  LIVER FUNCTIONS - ( 2023 06:33 )  Alb: 4.0 g/dL / Pro: 7.0 g/dL / ALK PHOS: 105 U/L / ALT: 10 U/L / AST: 26 U/L / GGT: x           PT/INR - ( 2023 06:33 )   PT: 20.4 sec;   INR: 1.89 ratio               Urinalysis Basic - ( 2023 06:33 )    Color: x / Appearance: x / SG: x / pH: x  Gluc: 442 mg/dL / Ketone: x  / Bili: x / Urobili: x   Blood: x / Protein: x / Nitrite: x   Leuk Esterase: x / RBC: x / WBC x   Sq Epi: x / Non Sq Epi: x / Bacteria: x           Alin Santizo  PGY-2 Resident Physician     Patient is a 63y old  Female who presents with a chief complaint of Atrial fib (2023 01:42)    C/S Reason: SOB on exertion     HPI:   64y/o  female with known implantable device PPM [facility] 2018 Covid 19 negative unvaccinated with PMHX of PPM Afib on Eliquis last dose on 23 AM dose , HTN, Tobacco smoker current 0.5 ppd , CHF and AVB. PT presents with recent increasing fatigue sob with exertion and decrease exercise tolerance. Now referred for Afib Ablation with Dr. Roca. Currently no acute distress noted no palpitations noted. Patient is s/p afib ablation and presented w/ new oxygen needs. States at home she has been using 2L, currently on 4L. Pulm consulted in regards to hypoxia. Patient expressing pleuritic chest pain since time of procedure. Otherwise states SOB is due to inability to take complete breath due to CP. Patient follows [facility] in private practice. Uses albuterol PRN at home; prior history of Spiriva use, but discontinued many years prior. Currently still smoking at least 0.5 packs per year.     MEDICATIONS  (STANDING):  albuterol/ipratropium for Nebulization 3 milliLiter(s) Nebulizer every 6 hours  apixaban 5 milliGRAM(s) Oral every 12 hours  atorvastatin 40 milliGRAM(s) Oral at bedtime  budesonide 160 MICROgram(s)/formoterol 4.5 MICROgram(s) Inhaler 2 Puff(s) Inhalation two times a day  ceFAZolin   IVPB      ceFAZolin   IVPB 1000 milliGRAM(s) IV Intermittent every 8 hours  furosemide   Injectable 60 milliGRAM(s) IV Push every 12 hours  insulin glargine Injectable (LANTUS) 30 Unit(s) SubCutaneous at bedtime  insulin lispro (ADMELOG) corrective regimen sliding scale   SubCutaneous three times a day before meals  insulin lispro Injectable (ADMELOG) 7 Unit(s) SubCutaneous three times a day before meals  methylPREDNISolone sodium succinate Injectable 40 milliGRAM(s) IV Push every 12 hours  nicotine -   7 mG/24Hr(s) Patch 1 Patch Transdermal daily  pantoprazole    Tablet 40 milliGRAM(s) Oral before breakfast  tiotropium 2.5 MICROgram(s) Inhaler 2 Puff(s) Inhalation daily    MEDICATIONS  (PRN):  acetaminophen     Tablet .. 650 milliGRAM(s) Oral every 6 hours PRN Temp greater or equal to 38C (100.4F), Moderate Pain (4 - 6)  ipratropium  for Nebulization. 500 MICROGram(s) Nebulizer once PRN Shortness of Breath    Allergies    No Known Allergies    Intolerances        Vital Signs Last 24 Hrs  T(C): 36.6 (2023 08:26), Max: 36.6 (2023 12:12)  T(F): 97.9 (2023 08:26), Max: 97.9 (2023 12:12)  HR: 72 (2023 08:26) (71 - 77)  BP: 121/63 (2023 08:26) (102/59 - 126/77)  BP(mean): 71 (2023 15:47) (71 - 71)  RR: 18 (2023 08:26) (17 - 18)  SpO2: 92% (2023 08:26) (86% - 93%)    Parameters below as of 2023 08:26  Patient On (Oxygen Delivery Method): nasal cannula  O2 Flow (L/min): 4    Daily     Daily Weight in k.7 (2023 08:32)  I&O's Summary    2023 07:01  -  2023 07:00  --------------------------------------------------------  IN: 480 mL / OUT: 2500 mL / NET: -2020 mL        Physical Exam  GENERAL: In distress  HEAD:  Atraumatic, Normocephalic  EYES: EOMI, PERRLA, conjunctiva and sclera clear  NECK: Supple, No JVD  CHEST/LUNG: B/l crackles in upper & lower lobes; on NC  HEART: Regular rate and rhythm; No murmurs, rubs, or gallops  ABDOMEN: Soft, Nontender, Nondistended; Bowel sounds present  EXTREMITIES:  2+ Peripheral Pulses, No clubbing, cyanosis, or edema  PSYCH: AAOx3  NEUROLOGY: non-focal  SKIN: No rashes or lesions    DIAGNOSTICS:                         14.5   10.51 )-----------( 120      ( 2023 07:08 )             48.1     Hgb Trend: 14.5<--, 15.2<--, 14.7<--      127<L>  |  86<L>  |  20  ----------------------------<  442<H>  3.9   |  25  |  0.52    Ca    8.8      2023 06:33  Phos  2.3       Mg     1.7         TPro  7.0  /  Alb  4.0  /  TBili  0.5  /  DBili  0.2  /  AST  26  /  ALT  10  /  AlkPhos  105      CAPILLARY BLOOD GLUCOSE        Creatinine Trend: 0.52<--, 0.60<--, 0.47<--  LIVER FUNCTIONS - ( 2023 06:33 )  Alb: 4.0 g/dL / Pro: 7.0 g/dL / ALK PHOS: 105 U/L / ALT: 10 U/L / AST: 26 U/L / GGT: x           PT/INR - ( 2023 06:33 )   PT: 20.4 sec;   INR: 1.89 ratio               Urinalysis Basic - ( 2023 06:33 )    Color: x / Appearance: x / SG: x / pH: x  Gluc: 442 mg/dL / Ketone: x  / Bili: x / Urobili: x   Blood: x / Protein: x / Nitrite: x   Leuk Esterase: x / RBC: x / WBC x   Sq Epi: x / Non Sq Epi: x / Bacteria: x

## 2023-07-28 NOTE — CHART NOTE - NSCHARTNOTEFT_GEN_A_CORE
Notified by RN, pt w/ PAT w/ HR up to 130 for 8 seconds on tele  Pt asymptomatic during event  Vital Signs Last 24 Hrs  T(C): 36.4 (28 Jul 2023 04:44), Max: 36.7 (27 Jul 2023 07:43)  T(F): 97.6 (28 Jul 2023 04:44), Max: 98 (27 Jul 2023 07:43)  HR: 77 (28 Jul 2023 04:44) (60 - 77)  BP: 126/73 (28 Jul 2023 04:44) (102/59 - 126/77)  BP(mean): 71 (27 Jul 2023 15:47) (71 - 71)  RR: 18 (28 Jul 2023 04:44) (17 - 18)  SpO2: 92% (28 Jul 2023 04:44) (86% - 93%)    Parameters below as of 28 Jul 2023 04:44  Patient On (Oxygen Delivery Method): nasal cannula  O2 Flow (L/min): 5      >EKG STAT  >CBC, CMP, Mag, Phos STAT  >Will continue to closely assess and monitor overnight  >Will endorse to day team    -Klaudia Marks PA-C  [id number]

## 2023-07-28 NOTE — CONSULT NOTE ADULT - ASSESSMENT
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 Referred for Afib ablation with Dr. Roca. S/p procedure (7/26) w/o known complications. Post procedure noted increased oxygen requirements (4-5L NC, sating ~88-90%). Pulm consulted for noted desat. Patient follows [facility], where she had dx of COPD. States she currently uses albuterol, but had used Spiriva in past. On 2L O2 via NC.     #HYPOXIA  #2/2 CHF Exacerbation vs unlikely COPD Exacerbation   -currently on 4L NC w/ saturation at 90%  -BL 2L O2  -audible b/l crackles w/ CXR showing b/l pleural effusions  -TTE: EF 63%, grade 3 diastolic dysfunction, LA/RV/RA dilation, PASP 54  -uses lasix 40 PO qday at home; currently on lasix 60 IV BID  > hypoxia likely due to decompensated HF  > consider increasing lasix to 80 IV BID  > strict Is/Os; daily weights  > f/u VBG & wean O2 as tolerated  > Goal SpO2 b/w 88-92%  > optimize COPD regimen: albuterol q6 hrs PRN + scheduled Spiriva & Symbicort     NOTE TENTATIVE UNTIL ATTENDING ATTESTATION    63F w/ PMH of AVB w/ PPM ([facility], 2018), Afib (on Eliquis), COPD (BL 2L), HTN, CHF, & current smoker (50 pack-yr) p/w exertional dyspnea & decreased exercise tolerance. Referred for Afib ablation with Dr. Roca. S/p procedure (7/26) w/o known complications. Post procedure noted increased oxygen requirements (4-5L NC, sating ~88-90%). Pulm consulted for noted desat. Patient follows [facility], where she had dx of COPD. States she currently uses albuterol, but had used Spiriva in past. On 2L O2 via NC.     #HYPOXIA  #2/2 CHF Exacerbation vs unlikely COPD Exacerbation   -currently on 4L NC w/ saturation at 90%  -BL 2L O2  -audible b/l crackles w/ CXR showing b/l pleural effusions  -TTE: EF 63%, grade 3 diastolic dysfunction, LA/RV/RA dilation, PASP 54  -uses lasix 40 PO qday at home; currently on lasix 60 IV BID  > hypoxia likely due to decompensated HF  > consider increasing lasix to 80 IV BID  > strict Is/Os; daily weights  > f/u VBG & wean O2 as tolerated  > Goal SpO2 b/w 88-92%  > d/c steroids, unlikely to be COPD exacerbation  > optimize COPD regimen: albuterol q6 hrs PRN + scheduled Spiriva & Symbicort     NOTE TENTATIVE UNTIL ATTENDING ATTESTATION    63F w/ PMH of AVB w/ PPM ([facility], 2018), Afib (on Eliquis), COPD (BL 2L), HTN, CHF, & current smoker (50 pack-yr) p/w exertional dyspnea & decreased exercise tolerance. Referred for Afib ablation with Dr. Roca. S/p procedure (7/26) w/o known complications. Post procedure noted increased oxygen requirements (4-5L NC, sating ~88-90%). Pulm consulted for noted desat. Patient follows [facility], where she had dx of COPD. States she currently uses albuterol, but had used Spiriva in past. On 2L O2 via NC.     #HYPOXIA  #2/2 CHF Exacerbation vs unlikely COPD Exacerbation   -currently on 4L NC w/ saturation at 90%  -BL 2L O2  -audible b/l crackles w/ CXR showing b/l pleural effusions  -TTE: EF 63%, grade 3 diastolic dysfunction, LA/RV/RA dilation, PASP 54  -uses lasix 40 PO qday at home; currently on lasix 60 IV BID  > hypoxia likely due to decompensated HF w/ underlying COPD  > c/w diuresis: lasix 80 IV BID  > strict Is/Os; daily weights  > f/u VBG & wean O2 as tolerated  > goal SpO2 b/w 88-92%  > d/c steroids, unlikely to be COPD exacerbation  > optimize COPD regimen: albuterol q6 hrs PRN + scheduled Spiriva & Symbicort     NOTE TENTATIVE UNTIL ATTENDING ATTESTATION

## 2023-07-28 NOTE — CONSULT NOTE ADULT - PROBLEM SELECTOR RECOMMENDATION 9
Will start Lantus 12 u at bedtime.  Will start Admelog 3 u before each meal and continue Admelog correction scale coverage. Will continue monitoring FS and FU.
Outpt PET/CT in April 2023 w/o evidence of Sarcoid. TTE reveal Biatrial enlargement ?amyloid; Would obtain FLC. Would need ischemic eval   - Would start Lasix 60mg IVP q 12hrs today  - Perform daily standing weights, strict I/O's  - Daily BMP, LFTs. Please check ProBNP and TSH in AM with Serum FLC and immunofixation  - Target electrolyte repletion to maintain K+ >4.0 and Mg >2.0

## 2023-07-28 NOTE — PROGRESS NOTE ADULT - SUBJECTIVE AND OBJECTIVE BOX
Patient is a 63y old  Female who presents with a chief complaint of shortness of breath (28 Jul 2023 12:33)      SUBJECTIVE / OVERNIGHT EVENTS: ptn feels better, cont solumedrol, cont IV Lasix pulm and card consult pending seen by HF 7/27, seen by pulm 7/27, on ABx for LE cellulitis, hyperglycemia 2/2 IV steroids, endo called, started on Lantus and pre meal Lispro    MEDICATIONS  (STANDING):  albuterol/ipratropium for Nebulization 3 milliLiter(s) Nebulizer every 6 hours  apixaban 5 milliGRAM(s) Oral every 12 hours  atorvastatin 40 milliGRAM(s) Oral at bedtime  budesonide 160 MICROgram(s)/formoterol 4.5 MICROgram(s) Inhaler 2 Puff(s) Inhalation two times a day  ceFAZolin   IVPB 1000 milliGRAM(s) IV Intermittent every 8 hours  ceFAZolin   IVPB      chlorhexidine 2% Cloths 1 Application(s) Topical <User Schedule>  furosemide   Injectable 60 milliGRAM(s) IV Push every 12 hours  insulin glargine Injectable (LANTUS) 12 Unit(s) SubCutaneous at bedtime  insulin lispro (ADMELOG) corrective regimen sliding scale   SubCutaneous three times a day before meals  insulin lispro Injectable (ADMELOG) 3 Unit(s) SubCutaneous three times a day before meals  methylPREDNISolone sodium succinate Injectable 40 milliGRAM(s) IV Push every 12 hours  nicotine -   7 mG/24Hr(s) Patch 1 Patch Transdermal daily  pantoprazole    Tablet 40 milliGRAM(s) Oral before breakfast  tiotropium 2.5 MICROgram(s) Inhaler 2 Puff(s) Inhalation daily    MEDICATIONS  (PRN):  acetaminophen     Tablet .. 650 milliGRAM(s) Oral every 6 hours PRN Temp greater or equal to 38C (100.4F), Moderate Pain (4 - 6)  ipratropium  for Nebulization. 500 MICROGram(s) Nebulizer once PRN Shortness of Breath      Vital Signs Last 24 Hrs  T(F): 98.1 (07-28-23 @ 11:28), Max: 98.1 (07-28-23 @ 11:28)  HR: 82 (07-28-23 @ 11:28) (71 - 82)  BP: 144/77 (07-28-23 @ 11:28) (102/59 - 144/77)  RR: 18 (07-28-23 @ 11:28) (17 - 18)  SpO2: 90% (07-28-23 @ 11:28) (90% - 93%)  Telemetry:   CAPILLARY BLOOD GLUCOSE      POCT Blood Glucose.: 163 mg/dL (28 Jul 2023 11:46)    I&O's Summary    27 Jul 2023 07:01  -  28 Jul 2023 07:00  --------------------------------------------------------  IN: 480 mL / OUT: 2500 mL / NET: -2020 mL        PHYSICAL EXAM:  GENERAL: NAD, well-developed  HEAD:  Atraumatic, Normocephalic  EYES: EOMI, PERRLA, conjunctiva and sclera clear  NECK: Supple, No JVD  CHEST/LUNG: Clear to auscultation bilaterally; No wheeze  HEART: Regular rate and rhythm; No murmurs, rubs, or gallops  ABDOMEN: Soft, Nontender, Nondistended; Bowel sounds present  EXTREMITIES:  2+ Peripheral Pulses, No clubbing, cyanosis, or edema  PSYCH: AAOx3  NEUROLOGY: non-focal  SKIN: No rashes or lesions    LABS:                        14.5   10.51 )-----------( 120      ( 28 Jul 2023 07:08 )             48.1     07-28    127<L>  |  86<L>  |  20  ----------------------------<  442<H>  3.9   |  25  |  0.52    Ca    8.8      28 Jul 2023 06:33  Phos  2.3     07-28  Mg     1.7     07-28    TPro  7.0  /  Alb  4.0  /  TBili  0.5  /  DBili  0.2  /  AST  26  /  ALT  10  /  AlkPhos  105  07-28    PT/INR - ( 28 Jul 2023 06:33 )   PT: 20.4 sec;   INR: 1.89 ratio               Urinalysis Basic - ( 28 Jul 2023 06:33 )    Color: x / Appearance: x / SG: x / pH: x  Gluc: 442 mg/dL / Ketone: x  / Bili: x / Urobili: x   Blood: x / Protein: x / Nitrite: x   Leuk Esterase: x / RBC: x / WBC x   Sq Epi: x / Non Sq Epi: x / Bacteria: x        RADIOLOGY & ADDITIONAL TESTS:    Imaging Personally Reviewed:    Consultant(s) Notes Reviewed:      Care Discussed with Consultants/Other Providers:

## 2023-07-28 NOTE — CONSULT NOTE ADULT - SUBJECTIVE AND OBJECTIVE BOX
CARDIOLOGY CONSULT - Dr. 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 Now referred for Afib Ablation with Dr. Roca.            PAST MEDICAL & SURGICAL HISTORY:  Chronic atrial fibrillation      HTN (hypertension)      Acute on chronic systolic congestive heart failure      Former light tobacco smoker      Pacemaker      History of abdominal hernia              PREVIOUS DIAGNOSTIC TESTING:    NM PET/CT Myocardial Sarcoidosis FDG (04.20.23 @ 13:19) >  OTHER STUDIES USED FOR CORRELATION: None  IMPRESSION: FDG PET/CT of the heart shows no evidence of FDG-avid   myocardial inflammation. Diffuse myocardial hypermetabolism seen on prior   study dated 4/6/2023, is not seen on the current study, and most likely   was due to inadequate dietary preparation.        MEDICATIONS:  Home Medications:  Eliquis 5 mg oral tablet: 1 orally 2 times a day (26 Jul 2023 11:39)  Lasix 40 mg oral tablet: 1 orally once a day (26 Jul 2023 11:42)  simvastatin 20 mg oral tablet: 1 orally once a day (26 Jul 2023 11:42)      MEDICATIONS  (STANDING):  albuterol/ipratropium for Nebulization 3 milliLiter(s) Nebulizer every 6 hours  apixaban 5 milliGRAM(s) Oral every 12 hours  atorvastatin 40 milliGRAM(s) Oral at bedtime  budesonide 160 MICROgram(s)/formoterol 4.5 MICROgram(s) Inhaler 2 Puff(s) Inhalation two times a day  ceFAZolin   IVPB      ceFAZolin   IVPB 1000 milliGRAM(s) IV Intermittent every 8 hours  chlorhexidine 2% Cloths 1 Application(s) Topical <User Schedule>  furosemide   Injectable 80 milliGRAM(s) IV Push every 12 hours  hydrALAZINE 10 milliGRAM(s) Oral three times a day  insulin glargine Injectable (LANTUS) 12 Unit(s) SubCutaneous at bedtime  insulin lispro (ADMELOG) corrective regimen sliding scale   SubCutaneous three times a day before meals  insulin lispro Injectable (ADMELOG) 3 Unit(s) SubCutaneous three times a day before meals  methylPREDNISolone sodium succinate Injectable 40 milliGRAM(s) IV Push every 12 hours  nicotine -   7 mG/24Hr(s) Patch 1 Patch Transdermal daily  pantoprazole    Tablet 40 milliGRAM(s) Oral before breakfast  tiotropium 2.5 MICROgram(s) Inhaler 2 Puff(s) Inhalation daily      FAMILY HISTORY:  FH: myocardial infarction (Mother)    FH: CHF (congestive heart failure) (Father)        SOCIAL HISTORY:    [ ] Non-smoker  [ ] Smoker  [ ] Alcohol    Allergies    No Known Allergies    Intolerances    	    REVIEW OF SYSTEMS:  CONSTITUTIONAL: No fever, weight loss, or fatigue  EYES: No eye pain, visual disturbances, or discharge  ENMT:  No difficulty hearing, tinnitus, vertigo; No sinus or throat pain  NECK: No pain or stiffness  RESPIRATORY: No cough, wheezing, chills or hemoptysis; No Shortness of Breath  CARDIOVASCULAR: No chest pain, palpitations, passing out, dizziness, or leg swelling  GASTROINTESTINAL: No abdominal or epigastric pain. No nausea, vomiting, or hematemesis; No diarrhea or constipation. No melena or hematochezia.  GENITOURINARY: No dysuria, frequency, hematuria, or incontinence  NEUROLOGICAL: No headaches, memory loss, loss of strength, numbness, or tremors  SKIN: No itching, burning, rashes, or lesions   	    [ ] All others negative	  [ ] Unable to obtain    PHYSICAL EXAM:  T(C): 36.7 (07-28-23 @ 11:28), Max: 36.7 (07-28-23 @ 11:28)  HR: 82 (07-28-23 @ 11:28) (71 - 82)  BP: 144/77 (07-28-23 @ 11:28) (102/59 - 144/77)  RR: 18 (07-28-23 @ 11:28) (17 - 18)  SpO2: 90% (07-28-23 @ 11:28) (90% - 93%)  Wt(kg): --  I&O's Summary    27 Jul 2023 07:01  -  28 Jul 2023 07:00  --------------------------------------------------------  IN: 480 mL / OUT: 2500 mL / NET: -2020 mL        Appearance: Normal	  Psychiatry: A & O x 3, Mood & affect appropriate  HEENT:   Normal oral mucosa, PERRL, EOMI	  Lymphatic: No lymphadenopathy  Cardiovascular: Normal S1 S2,RRR, No JVD, No murmurs  Respiratory: Lungs clear to auscultation	  Gastrointestinal:  Soft, Non-tender, + BS	  Skin: No rashes, No ecchymoses, No cyanosis	  Neurologic: Non-focal  Extremities: Normal range of motion, No clubbing, cyanosis or edema  Vascular: Peripheral pulses palpable 2+ bilaterally    TELEMETRY: 	    ECG:  	  RADIOLOGY:  < from: TTE W or WO Ultrasound Enhancing Agent (07.27.23 @ 08:52) >    _______________________________________________________________________________________  CONCLUSIONS:      1. Normal left ventricular cavity size. The left ventricular wall thickness is normal. The left ventricular systolic function is normal with an ejection fraction of 63 % by Jones's method of disks. There are no regional wall motion abnormalities seen.   2. There is severe (grade 3) left ventricular diastolic dysfunction, with elevated filling pressure.   3. Severely enlarged right ventricular cavity size, normal right ventricular wall thickness and reduced systolic right ventricular function.The tricuspid annular plane systolic excursion (TAPSE) is 1.3 cm (normal >=1.7 cm).   4. The left atrium is severely dilated in size.   5. The right atrium is severely dilated in size.   6. Trace mitral regurgitation.   7. Estimated pulmonary artery systolic pressure is 54 mmHg.   8. No pericardial effusion seen.   9. Compared to the transesophageal echocardiogram performed on 7/26/2023 Patient has evidence of elevated left sided filling pressures with new septal flattening.    < from: [facility] W or WO Ultrasound Enhancing Agent (07.26.23 @ 13:22) >  _______________________________________________________________________________________  CONCLUSIONS:      1. Moderately enlarged right ventricular cavity size, normal right ventricular wall thickness and probably normal right ventricular systolic function.   2. The right atrium is severely dilated in size.   3. No pericardial effusion seen.   4. Moderate tricuspid regurgitation.   5. Estimated pulmonary artery systolic pressure is 51 mmHg, consistent with moderate pulmonary hypertension.   6. Left ventricular endocardium is not well visualized; however, the left ventricular systolic function appears grossly normal.   7. No prior echocardiogram is available for comparison.   8. The left atrial appendage emptying velocity is normal at 0 cm/s.      < end of copied text >    OTHER: 	  	  LABS:	 	    CARDIAC MARKERS:                                  14.5   10.51 )-----------( 120      ( 28 Jul 2023 07:08 )             48.1     07-28    127<L>  |  86<L>  |  20  ----------------------------<  442<H>  3.9   |  25  |  0.52    Ca    8.8      28 Jul 2023 06:33  Phos  2.3     07-28  Mg     1.7     07-28    TPro  7.0  /  Alb  4.0  /  TBili  0.5  /  DBili  0.2  /  AST  26  /  ALT  10  /  AlkPhos  105  07-28    PT/INR - ( 28 Jul 2023 06:33 )   PT: 20.4 sec;   INR: 1.89 ratio           proBNP:   Lipid Profile:   HgA1c:   TSH: Thyroid Stimulating Hormone, Serum: 0.43 uIU/mL (07-28 @ 06:33)         CARDIOLOGY CONSULT - Dr. 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 Now referred for Afib Ablation with Dr. Roca. Post procedure pt went into HF. Cards eval for further management. On exam no cp but reports SOB.  she denies hx of cad, mi or valvular disease.   NM study from 4/20/23 no evidence of FDG-avid  myocardial inflammation. Diffuse myocardial hypermetabolism seen on prior  study dated 4/6/2023, is not seen on the current study, and most likely  was due to inadequate dietary preparation.  TTE 7/26: grossly normal appearing LVF, mod RVE, BiAtrial enlargement, mild MR, mod TR, PASP 51mmHg  ros otherwise negative        PAST MEDICAL & SURGICAL HISTORY:  Chronic atrial fibrillation      HTN (hypertension)      Acute on chronic systolic congestive heart failure      Former light tobacco smoker      Pacemaker      History of abdominal hernia              PREVIOUS DIAGNOSTIC TESTING:    NM PET/CT Myocardial Sarcoidosis FDG (04.20.23 @ 13:19) >  OTHER STUDIES USED FOR CORRELATION: None  IMPRESSION: FDG PET/CT of the heart shows no evidence of FDG-avid   myocardial inflammation. Diffuse myocardial hypermetabolism seen on prior   study dated 4/6/2023, is not seen on the current study, and most likely   was due to inadequate dietary preparation.        MEDICATIONS:  Home Medications:  Eliquis 5 mg oral tablet: 1 orally 2 times a day (26 Jul 2023 11:39)  Lasix 40 mg oral tablet: 1 orally once a day (26 Jul 2023 11:42)  simvastatin 20 mg oral tablet: 1 orally once a day (26 Jul 2023 11:42)      MEDICATIONS  (STANDING):  albuterol/ipratropium for Nebulization 3 milliLiter(s) Nebulizer every 6 hours  apixaban 5 milliGRAM(s) Oral every 12 hours  atorvastatin 40 milliGRAM(s) Oral at bedtime  budesonide 160 MICROgram(s)/formoterol 4.5 MICROgram(s) Inhaler 2 Puff(s) Inhalation two times a day  ceFAZolin   IVPB      ceFAZolin   IVPB 1000 milliGRAM(s) IV Intermittent every 8 hours  chlorhexidine 2% Cloths 1 Application(s) Topical <User Schedule>  furosemide   Injectable 80 milliGRAM(s) IV Push every 12 hours  hydrALAZINE 10 milliGRAM(s) Oral three times a day  insulin glargine Injectable (LANTUS) 12 Unit(s) SubCutaneous at bedtime  insulin lispro (ADMELOG) corrective regimen sliding scale   SubCutaneous three times a day before meals  insulin lispro Injectable (ADMELOG) 3 Unit(s) SubCutaneous three times a day before meals  methylPREDNISolone sodium succinate Injectable 40 milliGRAM(s) IV Push every 12 hours  nicotine -   7 mG/24Hr(s) Patch 1 Patch Transdermal daily  pantoprazole    Tablet 40 milliGRAM(s) Oral before breakfast  tiotropium 2.5 MICROgram(s) Inhaler 2 Puff(s) Inhalation daily      FAMILY HISTORY:  FH: myocardial infarction (Mother)    FH: CHF (congestive heart failure) (Father)        SOCIAL HISTORY:    Tobacco smoker current 0.5 ppd     Allergies    No Known Allergies    Intolerances    	    REVIEW OF SYSTEMS:  CONSTITUTIONAL: No fever, weight loss, or fatigue  EYES: No eye pain, visual disturbances, or discharge  ENMT:  No difficulty hearing, tinnitus, vertigo; No sinus or throat pain  NECK: No pain or stiffness  RESPIRATORY: No cough, wheezing, chills or hemoptysis; ++ Shortness of Breath  CARDIOVASCULAR: No chest pain, palpitations, passing out, dizziness, or leg swelling  GASTROINTESTINAL: No abdominal or epigastric pain. No nausea, vomiting, or hematemesis; No diarrhea or constipation. No melena or hematochezia.  GENITOURINARY: No dysuria, frequency, hematuria, or incontinence  NEUROLOGICAL: No headaches, memory loss, loss of strength, numbness, or tremors  SKIN: No itching, burning, rashes, or lesions   	    [x ] All others negative	  [ ] Unable to obtain    PHYSICAL EXAM:  T(C): 36.7 (07-28-23 @ 11:28), Max: 36.7 (07-28-23 @ 11:28)  HR: 82 (07-28-23 @ 11:28) (71 - 82)  BP: 144/77 (07-28-23 @ 11:28) (102/59 - 144/77)  RR: 18 (07-28-23 @ 11:28) (17 - 18)  SpO2: 90% (07-28-23 @ 11:28) (90% - 93%)  Wt(kg): --  I&O's Summary    27 Jul 2023 07:01  -  28 Jul 2023 07:00  --------------------------------------------------------  IN: 480 mL / OUT: 2500 mL / NET: -2020 mL        Appearance: Normal	  Psychiatry: A & O x 3, Mood & affect appropriate  HEENT:   Normal oral mucosa, PERRL, EOMI	  Lymphatic: No lymphadenopathy  Cardiovascular: Normal S1 S2,RR  Respiratory: diminished   Gastrointestinal:  Soft, Non-tender, + BS	  Skin: No rashes, No ecchymoses, No cyanosis	  Neurologic: Non-focal  Extremities: le edema ++    TELEMETRY: 	  NSR v paced   brief pat 8.1 sec    ECG:  	junctional hr 60, rbbb, lvh repol  old inferior infarct  RADIOLOGY:    < from: Xray Chest 1 View- PORTABLE-Urgent (Xray Chest 1 View- PORTABLE-Urgent .) (07.27.23 @ 18:49) >  IMPRESSION:  Bilateral pleural effusions right greater than left with associated   atelectasis. Cardiomegaly.        < from: [facility] Duplex Lower Ext Vein Scan, Bilat (07.28.23 @ 10:13) >  IMPRESSION:  No evidence of deep venous thrombosis in either lower extremity.          < from: TTE W or WO Ultrasound Enhancing Agent (07.27.23 @ 08:52) >    _______________________________________________________________________________________  CONCLUSIONS:      1. Normal left ventricular cavity size. The left ventricular wall thickness is normal. The left ventricular systolic function is normal with an ejection fraction of 63 % by Jones's method of disks. There are no regional wall motion abnormalities seen.   2. There is severe (grade 3) left ventricular diastolic dysfunction, with elevated filling pressure.   3. Severely enlarged right ventricular cavity size, normal right ventricular wall thickness and reduced systolic right ventricular function.The tricuspid annular plane systolic excursion (TAPSE) is 1.3 cm (normal >=1.7 cm).   4. The left atrium is severely dilated in size.   5. The right atrium is severely dilated in size.   6. Trace mitral regurgitation.   7. Estimated pulmonary artery systolic pressure is 54 mmHg.   8. No pericardial effusion seen.   9. Compared to the transesophageal echocardiogram performed on 7/26/2023 Patient has evidence of elevated left sided filling pressures with new septal flattening.    < from: [facility] W or WO Ultrasound Enhancing Agent (07.26.23 @ 13:22) >  _______________________________________________________________________________________  CONCLUSIONS:      1. Moderately enlarged right ventricular cavity size, normal right ventricular wall thickness and probably normal right ventricular systolic function.   2. The right atrium is severely dilated in size.   3. No pericardial effusion seen.   4. Moderate tricuspid regurgitation.   5. Estimated pulmonary artery systolic pressure is 51 mmHg, consistent with moderate pulmonary hypertension.   6. Left ventricular endocardium is not well visualized; however, the left ventricular systolic function appears grossly normal.   7. No prior echocardiogram is available for comparison.   8. The left atrial appendage emptying velocity is normal at 0 cm/s.      < end of copied text >    OTHER: 	  	  LABS:	 	    CARDIAC MARKERS:                                  14.5   10.51 )-----------( 120      ( 28 Jul 2023 07:08 )             48.1     07-28    127<L>  |  86<L>  |  20  ----------------------------<  442<H>  3.9   |  25  |  0.52    Ca    8.8      28 Jul 2023 06:33  Phos  2.3     07-28  Mg     1.7     07-28    TPro  7.0  /  Alb  4.0  /  TBili  0.5  /  DBili  0.2  /  AST  26  /  ALT  10  /  AlkPhos  105  07-28    PT/INR - ( 28 Jul 2023 06:33 )   PT: 20.4 sec;   INR: 1.89 ratio           proBNP:   Lipid Profile:   HgA1c:   TSH: Thyroid Stimulating Hormone, Serum: 0.43 uIU/mL (07-28 @ 06:33)

## 2023-07-28 NOTE — PROGRESS NOTE ADULT - ASSESSMENT
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 Lasix 60mg IV BID  - Continue Eliquis

## 2023-07-28 NOTE — CONSULT NOTE ADULT - ASSESSMENT
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 Referred for Afib ablation with Dr. Roca. S/p procedure (7/26) w/o known complications. Post p  Assessment  Hyperglycemias: Steroid induced, a1c pending, on IV Methylprednisolone, eating meals, started on insulin.   Smoker: shortness of breath, on O2, started on steroids.   HTN/CHF: on antihypertensive medications, monitored, asymptomatic.      Discussed plan and management with Attending   Roger Woods NP - TEAMS  Dr Sebas Childers  [phone]

## 2023-07-28 NOTE — PROGRESS NOTE ADULT - SUBJECTIVE AND OBJECTIVE BOX
24H hour events: Currently off unit for vascular study    MEDICATIONS:  apixaban 5 milliGRAM(s) Oral every 12 hours  furosemide   Injectable 60 milliGRAM(s) IV Push every 12 hours  ceFAZolin   IVPB      ceFAZolin   IVPB 1000 milliGRAM(s) IV Intermittent every 8 hours  albuterol/ipratropium for Nebulization 3 milliLiter(s) Nebulizer every 6 hours  budesonide 160 MICROgram(s)/formoterol 4.5 MICROgram(s) Inhaler 2 Puff(s) Inhalation two times a day  ipratropium  for Nebulization. 500 MICROGram(s) Nebulizer once PRN  tiotropium 2.5 MICROgram(s) Inhaler 2 Puff(s) Inhalation daily  acetaminophen     Tablet .. 650 milliGRAM(s) Oral every 6 hours PRN  pantoprazole    Tablet 40 milliGRAM(s) Oral before breakfast  atorvastatin 40 milliGRAM(s) Oral at bedtime  insulin glargine Injectable (LANTUS) 30 Unit(s) SubCutaneous at bedtime  insulin lispro (ADMELOG) corrective regimen sliding scale   SubCutaneous three times a day before meals  insulin lispro Injectable (ADMELOG) 7 Unit(s) SubCutaneous three times a day before meals  methylPREDNISolone sodium succinate Injectable 40 milliGRAM(s) IV Push every 12 hours    REVIEW OF SYSTEMS:  See HPI, otherwise ROS negative.    PHYSICAL EXAM:  T(C): 36.6 (07-28-23 @ 08:26), Max: 36.6 (07-27-23 @ 12:12)  HR: 72 (07-28-23 @ 08:26) (71 - 77)  BP: 121/63 (07-28-23 @ 08:26) (102/59 - 126/77)  RR: 18 (07-28-23 @ 08:26) (17 - 18)  SpO2: 91% (07-28-23 @ 10:01) (86% - 93%)  Wt(kg): --  I&O's Summary    27 Jul 2023 07:01  -  28 Jul 2023 07:00  --------------------------------------------------------  IN: 480 mL / OUT: 2500 mL / NET: -2020 mL    LABS:	 	  CBC Full  -  ( 28 Jul 2023 07:08 )  WBC Count : 10.51 K/uL  Hemoglobin : 14.5 g/dL  Hematocrit : 48.1 %  Platelet Count - Automated : 120 K/uL  Mean Cell Volume : 98.0 fl  Mean Cell Hemoglobin : 29.5 pg  Mean Cell Hemoglobin Concentration : 30.1 gm/dL  Auto Neutrophil # : x  Auto Lymphocyte # : x  Auto Monocyte # : x  Auto Eosinophil # : x  Auto Basophil # : x  Auto Neutrophil % : x  Auto Lymphocyte % : x  Auto Monocyte % : x  Auto Eosinophil % : x  Auto Basophil % : x    07-28    127<L>  |  86<L>  |  20  ----------------------------<  442<H>  3.9   |  25  |  0.52  07-27    140  |  104  |  13  ----------------------------<  195<H>  4.8   |  26  |  0.60    Ca    8.8      28 Jul 2023 06:33  Ca    9.6      27 Jul 2023 01:11  Phos  2.3     07-28  Mg     1.7     07-28    TPro  7.0  /  Alb  4.0  /  TBili  0.5  /  DBili  0.2  /  AST  26  /  ALT  10  /  AlkPhos  105  07-28    TSH: Thyroid Stimulating Hormone, Serum: 0.43 uIU/mL (07-28 @ 06:33)    TELEMETRY: SR vpaced 70s    ECHO:  TRANSTHORACIC ECHOCARDIOGRAM REPORT  ________________________________________________________________________________                                      _______       Pt. Name:       CHETNA MONTEMAYOR Study Date:    7/27/2023  MRN:            [id number]        YOB: 1960  Accession #:    [id number]         Age:           63 years  Account#:       [id number]      Gender:        F  Heart Rate:     67 bpm            Height:        64.17 in (163.00 cm)  Rhythm:         sinus rhythm     Weight:        180.78 lb (82.00 kg)  Blood Pressure: 124/56 mmHg       BSA/BMI:       1.88 m² / 30.86 kg/m²  ________________________________________________________________________________________  Referring Physician:    [id number] Hermilo Roca  Interpreting Physician: Yan Stanford M.D.  Primary Sonographer:    Solo Bazan [facility]  Fellow (Interpreting):  Cesar Barrientos    CPT:                ECHO TTE WITH CON COMP W DOPP - .m;DEFINITY ECHO                      CONTRAST PER ML - .m;DEFINITY ECHO CONTRAST PER ML                      WASTED - .m  Indication(s):      Abnormal electrocardiogram ECG/EKG - R94.31  Procedure:          Transthoracic echocardiogram with 2-D, M-mode and complete                      spectral and color flow Doppler.  Ordering Location:  [facility]  Admission Status:   Inpatient  Contrast Injection: Verbal consent was obtained for injection of Ultrasonic                      Enhancing Agent following a discussion of risks and                      benefits.                      Endocardial visualization enhanced with 2 ml of Definity                      Ultrasound enhancing agent (Lot#:[id number] Exp.Date:[id number]                      Discarded Dose:8ml).  UEA Reaction:       Patient had no adverse reaction after injection of                      Ultrasound Enhancing Agent.  Study Information:  Image quality for this study is less than ideal.    _______________________________________________________________________________________  CONCLUSIONS:      1. Normal left ventricular cavity size. The left ventricular wall thickness is normal. The left ventricular systolic function is normal with an ejection fraction of 63 % by Jones's method of disks. There are no regional wall motion abnormalities seen.   2. There is severe (grade 3) left ventricular diastolic dysfunction, with elevated filling pressure.   3. Severely enlarged right ventricular cavity size, normal right ventricular wall thickness and reduced systolic right ventricular function.The tricuspid annular plane systolic excursion (TAPSE) is 1.3 cm (normal >=1.7 cm).   4. The left atrium is severely dilated in size.   5. The right atrium is severely dilated in size.   6. Trace mitral regurgitation.   7. Estimated pulmonary artery systolic pressure is 54 mmHg.   8. No pericardial effusion seen.   9. Compared to the transesophageal echocardiogram performed on 7/26/2023 Patient has evidence of elevated left sided filling pressures with new septal flattening.    ________________________________________________________________________________________  FINDINGS:     Left Ventricle:  Normal left ventricular cavity size. The left ventricular wall thickness is normal. The interventricular septum is flattened in systole and diastoleconsistent with right ventricular pressure and volume overload. The left ventricular systolic function is normal with a calculated ejection fraction of 63 % by the Jones's biplane method of disks. There are no regional wall motion abnormalities seen. There is severe (grade 3) left ventricular diastolic dysfunction, with elevated filling pressure. There is no evidence of a thrombus in the left ventricle.     Right Ventricle:  Severely enlarged right ventricular cavity size, normal wall thicknessand reduced right ventricular systolic function. Tricuspid annular plane systolic excursion (TAPSE) is 1.3 cm (normal >=1.7 cm). Tricuspid annular tissue Doppler S' is 6.5 cm/s (normal >10 cm/s).     Left Atrium:  The left atrium is severely dilated in size with an indexed volume of 71.91 ml/m². The pulmonary venous systolic velocity is blunted consistent with elevated left atrial pressure.     Right Atrium:  The right atrium is severely dilated in size with an indexed volume of 131.03 ml/m².     Aortic Valve:  The aortic valve appears trileaflet. There is no evidence of aortic regurgitation.     Mitral Valve:  Structurally normal mitral valve with normal leaflet excursion. There is mild posterior calcification of the mitral valve annulus. There is trace mitral regurgitation.     Tricuspid Valve:  Structurally normal tricuspid valve with normal leaflet excursion. There is tricuspid valve annular dilation. There is mild tricuspid regurgitation. Estimated pulmonary artery systolic pressure is 54 mmHg.     Pulmonic Valve:  Structurally normal pulmonic valve with normal leaflet excursion. There is trace pulmonic regurgitation.     Aorta:  The aortic annulus and aortic root appear normal in size.     Pericardium:  No pericardial effusion seen.     Systemic Veins:  The inferior vena cava is dilated measuring 2.70 cm in diameter, (dilated >2.1cm) with abnormal inspiratory collapse (abnormal <50%) consistent with elevated right atrial pressure (~15, range 10-20mmHg).  ____________________________________________________________________  Quantitative Data:  Left Ventricle Measurements: (Indexed to BSA)     IVSd (2D):   1.0 cm  LVPWd (2D):  0.9 cm  LVIDd (2D):  5.1 cm  LVIDs (2D):  3.6 cm  LV Mass:     171 g  90.9 g/m²  BiPlane LV EF%: 63 %     MV E Vmax:    1.43 m/s  MV A Vmax:    0.43 m/s  MV E/A:       3.35  e' lateral:   12.90 cm/s  e' medial:    6.96 cm/s  E/e' lateral: 11.09  E/e' medial:  20.55  E/e' Average: 14.40    Aorta Measurements:     Ao Root s, 2D: 3.2 cm       Left Atrium Measurements: (Indexed to BSA)  LA Diam 2D: 4.70 cm    Right Ventricle Measurements: Right Atrial Measurements:     TAPSE:           1.3 cm       RA Vol:       246.00 ml  TV Katarina. S':      6.53 cm/s    RA Vol Index: 131.03 ml/m²  RV Base (RVID1):6.5 cm  RV Mid (RVID2):  5.2 cm       LVOT / RVOT/ Qp/Qs Data: (Indexed to BSA)  LVOT Diameter: 2.00 cm  LVOT Vmax:     1.10 m/s  LVOT VTI:      20.50 cm  LVOT SV:       64.4 ml  34.30 ml/m²    Mitral Valve Measurements:     MV E Vmax: 1.4 m/s  MV A Vmax: 0.4 m/s  MV E/A:    3.3       Tricuspid Valve Measurements:     TR Vmax:          3.1 m/s  TR Peak Gradient: 38.7 mmHg  RA Pressure:      15 mmHg  PASP:             54 mmHg    ________________________________________________________________________________________  Electronically signed on 7/27/2023 at 3:37:04 PM by Yan Stanford M.D.

## 2023-07-28 NOTE — CONSULT NOTE ADULT - SUBJECTIVE AND OBJECTIVE BOX
HPI:  This is a 64y/o  female with known implantable device PPM [facility] 2018 Covid 19 negative unvaccinated with PMHX of PPM Afib on Eliquis last dose on Monday 7/24/23 AM dose , HTN, Tobacco smoker current 0.5 ppd , CHF and AVB. PT presents with recent increasing fatigue sob with exertion and decrease exercise tolerance. Now referred for Afib Ablation with Dr. Roca. Currently no acute distress noted no palpitations noted.   Pt Cardiologist Dr. Serna ( [facility] )     < from: NM PET/CT Myocardial Sarcoidosis FDG (04.20.23 @ 13:19) >  OTHER STUDIES USED FOR CORRELATION: None    FINDINGS:    FDG-PET/CT images of the heart show physiologic blood pool activity.   There is no myocardial uptake of FDG. Previously seen diffuse myocardial   hypermetabolism is not present on the current study.    IMPRESSION: FDG PET/CT of the heart shows no evidence of FDG-avid   myocardial inflammation. Diffuse myocardial hypermetabolism seen on prior   study dated 4/6/2023, is not seen on the current study, and most likely   was due to inadequate dietary preparation.    --- End of Report ---               AGUSTINA GILES MD; Attending Nuclear Medicine     < end of copied text >   (26 Jul 2023 10:14)          PAST MEDICAL & SURGICAL HISTORY:  Chronic atrial fibrillation      HTN (hypertension)      Acute on chronic systolic congestive heart failure      Former light tobacco smoker      Pacemaker      History of abdominal hernia          FAMILY HISTORY:  FH: myocardial infarction (Mother)    FH: CHF (congestive heart failure) (Father)        Social History:            HOME MEDICATIONS:  Home Medications:  Eliquis 5 mg oral tablet: 1 orally 2 times a day (26 Jul 2023 11:39)  Lasix 40 mg oral tablet: 1 orally once a day (26 Jul 2023 11:42)  simvastatin 20 mg oral tablet: 1 orally once a day (26 Jul 2023 11:42)            MEDICATIONS  (STANDING):  albuterol/ipratropium for Nebulization 3 milliLiter(s) Nebulizer every 6 hours  apixaban 5 milliGRAM(s) Oral every 12 hours  atorvastatin 40 milliGRAM(s) Oral at bedtime  budesonide 160 MICROgram(s)/formoterol 4.5 MICROgram(s) Inhaler 2 Puff(s) Inhalation two times a day  ceFAZolin   IVPB 1000 milliGRAM(s) IV Intermittent every 8 hours  ceFAZolin   IVPB      chlorhexidine 2% Cloths 1 Application(s) Topical <User Schedule>  furosemide   Injectable 60 milliGRAM(s) IV Push every 12 hours  insulin glargine Injectable (LANTUS) 12 Unit(s) SubCutaneous at bedtime  insulin lispro (ADMELOG) corrective regimen sliding scale   SubCutaneous three times a day before meals  insulin lispro Injectable (ADMELOG) 3 Unit(s) SubCutaneous three times a day before meals  methylPREDNISolone sodium succinate Injectable 40 milliGRAM(s) IV Push every 12 hours  nicotine -   7 mG/24Hr(s) Patch 1 Patch Transdermal daily  pantoprazole    Tablet 40 milliGRAM(s) Oral before breakfast  tiotropium 2.5 MICROgram(s) Inhaler 2 Puff(s) Inhalation daily    MEDICATIONS  (PRN):  acetaminophen     Tablet .. 650 milliGRAM(s) Oral every 6 hours PRN Temp greater or equal to 38C (100.4F), Moderate Pain (4 - 6)  ipratropium  for Nebulization. 500 MICROGram(s) Nebulizer once PRN Shortness of Breath      Allergies    No Known Allergies    Intolerances        Review of Systems:  Neuro: No HA, no dizziness  Cardiovascular: No chest pain, no palpitations  Respiratory: no SOB, no cough  GI: No nausea, vomiting, abdominal pain  MSK: Denies joint/muscle pain      ALL OTHER SYSTEMS REVIEWED AND NEGATIVE        PHYSICAL EXAM:  VITALS: T(C): 36.7 (07-28-23 @ 11:28)  T(F): 98.1 (07-28-23 @ 11:28), Max: 98.1 (07-28-23 @ 11:28)  HR: 82 (07-28-23 @ 11:28) (71 - 82)  BP: 144/77 (07-28-23 @ 11:28) (102/59 - 144/77)  RR:  (17 - 18)  SpO2:  (90% - 93%)  Wt(kg): --  GENERAL: NAD, well-groomed, well-developed  NEURO:  alert and oriented  RESPIRATORY: Clear to auscultation bilaterally; No rales, rhonchi, wheezing  CARDIOVASCULAR: Si S2  GI: Soft, non distended, normal bowel sounds  MUSCULOSKELETAL: Moves all extremities equally       POCT Blood Glucose.: 163 mg/dL (07-28-23 @ 11:46)                            14.5   10.51 )-----------( 120      ( 28 Jul 2023 07:08 )             48.1       07-28    127<L>  |  86<L>  |  20  ----------------------------<  442<H>  3.9   |  25  |  0.52    eGFR: 105    Ca    8.8      07-28  Mg     1.7     07-28  Phos  2.3     07-28    TPro  7.0  /  Alb  4.0  /  TBili  0.5  /  DBili  0.2  /  AST  26  /  ALT  10  /  AlkPhos  105  07-28      Thyroid Function Tests:  07-28 @ 06:33 TSH 0.43 FreeT4 -- T3 31 Anti TPO -- Anti Thyroglobulin Ab -- TSI --    Diet, DASH/TLC:   Sodium & Cholesterol Restricted (07-27-23 @ 07:18) [Active]          A1C with Estimated Average Glucose Result: 5.6 % (07-28-23 @ 06:33)

## 2023-07-28 NOTE — CONSULT NOTE ADULT - PROBLEM SELECTOR RECOMMENDATION 2
- s/p AF ablation  - On Eliquis for AC
Suggest to continue medications, monitoring, FU primary team recommendations.

## 2023-07-28 NOTE — CONSULT NOTE ADULT - ASSESSMENT
ECHO 7/27/23: EF 63%; nl LV sys fx,  no regional wall motion abnormalities seen. severe (grade 3) left ventricular diastolic dysfunction, with elevated filling pressure.Estimated pulmonary artery systolic pressure is 54 mmHg. The right atrium is severely dilated. The left atrium is severely dilated   ARSH 7/26/23:  No pericardial effusion seen. Moderate tricuspid regurgitation. Estimated pulmonary artery systolic pressure is 51 mmHg, consistent with moderate pulmonary hypertension. Left ventricular endocardium is not well visualized; however, the left ventricular systolic function appears grossly normal.  NM PET/CT Myocardial Sarcoidosis FDG (04.20.23 @ 13:19) >  OTHER STUDIES USED FOR CORRELATION: None  IMPRESSION: FDG PET/CT of the heart shows no evidence of FDG-avid   myocardial inflammation. Diffuse myocardial hypermetabolism seen on prior   study dated 4/6/2023, is not seen on the current study, and most likely   was due to inadequate dietary preparation.    a/p      62y/o  female w PMH of AVB s/p PPM [facility] 2018, afib on Eliquis, HTN, tobacco smoker, CHF  presents with recent increasing fatigue , COATSE and decrease exercise tolerance. Now referred for Afib Ablation.  she is now SP AF ablation and admitted with acute CHF     # Afib s/p afib ablation on 7/26  - EP eval fu   - in nsr   - ac on eliquis     #acute on chronic Diastolic CHF   - sp ablation went into acute CHF   - overloaded on exam   - chest xray note d  - HF team eval noted- c.w lasix 80 mg IVP BID   - Outpt PET/CT in April 2023 w/o evidence of Sarcoid.   - TTE reveal Biatrial enlargement ?amyloid  - likely need ischemic eval once volume optimized   - le doppler neg for dvt     # AVB s/p PPM   -stable, ep  following     #COPD  -pulm fu

## 2023-07-28 NOTE — PROGRESS NOTE ADULT - ASSESSMENT
64 yo woman admitted for Afib ablation, post procedure developed diffuse rales and fluid overload.   ptn transferred to my service 2/2 acute hypoxic respiratory failure, systolic  R heart failure and diastolic L heart failure.. awaiting HF consult, s/p Afib ablation, now in sinus, on ELiquis. ptn is a chronic smoker, h/o PPM 2/2 AVB. ptn also c/o pleuritic CP " ever since i woke up from anesthesia" ptn states she is on home O2,, 2LNC. She is not on any COPD meds/inhalers, has a pulmonologist, smokes 1 PPD x50 yrs.  ptn is noted to have LE erythema, she has tend over LE    A/P  TTE c/w biventricular HF,   CTA chest pending , LE dopplers neg for DVT  cont solumedrol, cont IV Lasix pulm and card consult loren, seen by HF 7/27, seen by pulm 7/27, on ABx for LE cellulitis, hyperglycemia 2/2 IV steroids, endo called, started on Lantus and pre meal Lispro  cont  symbicort and spiriva, duonebs  cxr 7/27: b/l pl effusions R>L  ordered PYP scan to R/O cardiac amyloid when able to lie flat    - raise Lasix to 80 mg q12H IV  - cont statin, Eliquis    GI ppx w PPI

## 2023-07-29 LAB
A1C WITH ESTIMATED AVERAGE GLUCOSE RESULT: 5.6 % — SIGNIFICANT CHANGE UP (ref 4–5.6)
ALBUMIN SERPL ELPH-MCNC: 4.4 G/DL — SIGNIFICANT CHANGE UP (ref 3.3–5)
ALP SERPL-CCNC: 104 U/L — SIGNIFICANT CHANGE UP (ref 40–120)
ALT FLD-CCNC: 9 U/L — LOW (ref 10–45)
ANION GAP SERPL CALC-SCNC: 12 MMOL/L — SIGNIFICANT CHANGE UP (ref 5–17)
AST SERPL-CCNC: 17 U/L — SIGNIFICANT CHANGE UP (ref 10–40)
BILIRUB SERPL-MCNC: 0.8 MG/DL — SIGNIFICANT CHANGE UP (ref 0.2–1.2)
BUN SERPL-MCNC: 21 MG/DL — SIGNIFICANT CHANGE UP (ref 7–23)
CALCIUM SERPL-MCNC: 10.9 MG/DL — HIGH (ref 8.4–10.5)
CHLORIDE SERPL-SCNC: 90 MMOL/L — LOW (ref 96–108)
CO2 SERPL-SCNC: 32 MMOL/L — HIGH (ref 22–31)
CREAT SERPL-MCNC: 0.45 MG/DL — LOW (ref 0.5–1.3)
EGFR: 109 ML/MIN/1.73M2 — SIGNIFICANT CHANGE UP
ESTIMATED AVERAGE GLUCOSE: 114 MG/DL — SIGNIFICANT CHANGE UP (ref 68–114)
GLUCOSE BLDC GLUCOMTR-MCNC: 124 MG/DL — HIGH (ref 70–99)
GLUCOSE BLDC GLUCOMTR-MCNC: 139 MG/DL — HIGH (ref 70–99)
GLUCOSE BLDC GLUCOMTR-MCNC: 151 MG/DL — HIGH (ref 70–99)
GLUCOSE BLDC GLUCOMTR-MCNC: 186 MG/DL — HIGH (ref 70–99)
GLUCOSE SERPL-MCNC: 109 MG/DL — HIGH (ref 70–99)
HCT VFR BLD CALC: 45.5 % — HIGH (ref 34.5–45)
HGB BLD-MCNC: 14.1 G/DL — SIGNIFICANT CHANGE UP (ref 11.5–15.5)
MCHC RBC-ENTMCNC: 29.9 PG — SIGNIFICANT CHANGE UP (ref 27–34)
MCHC RBC-ENTMCNC: 31 GM/DL — LOW (ref 32–36)
MCV RBC AUTO: 96.6 FL — SIGNIFICANT CHANGE UP (ref 80–100)
NRBC # BLD: 0 /100 WBCS — SIGNIFICANT CHANGE UP (ref 0–0)
PLATELET # BLD AUTO: 117 K/UL — LOW (ref 150–400)
POTASSIUM SERPL-MCNC: 4.7 MMOL/L — SIGNIFICANT CHANGE UP (ref 3.5–5.3)
POTASSIUM SERPL-SCNC: 4.7 MMOL/L — SIGNIFICANT CHANGE UP (ref 3.5–5.3)
PROT SERPL-MCNC: 7.8 G/DL — SIGNIFICANT CHANGE UP (ref 6–8.3)
RBC # BLD: 4.71 M/UL — SIGNIFICANT CHANGE UP (ref 3.8–5.2)
RBC # FLD: 12.9 % — SIGNIFICANT CHANGE UP (ref 10.3–14.5)
SODIUM SERPL-SCNC: 134 MMOL/L — LOW (ref 135–145)
T4 FREE SERPL-MCNC: 1.1 NG/DL — SIGNIFICANT CHANGE UP (ref 0.9–1.8)
WBC # BLD: 10.94 K/UL — HIGH (ref 3.8–10.5)
WBC # FLD AUTO: 10.94 K/UL — HIGH (ref 3.8–10.5)

## 2023-07-29 PROCEDURE — 99232 SBSQ HOSP IP/OBS MODERATE 35: CPT

## 2023-07-29 PROCEDURE — 71045 X-RAY EXAM CHEST 1 VIEW: CPT | Mod: 26

## 2023-07-29 RX ORDER — COLCHICINE 0.6 MG
0.6 TABLET ORAL ONCE
Refills: 0 | Status: COMPLETED | OUTPATIENT
Start: 2023-07-29 | End: 2023-07-29

## 2023-07-29 RX ORDER — FUROSEMIDE 40 MG
10 TABLET ORAL
Qty: 500 | Refills: 0 | Status: DISCONTINUED | OUTPATIENT
Start: 2023-07-29 | End: 2023-07-31

## 2023-07-29 RX ORDER — INSULIN LISPRO 100/ML
4 VIAL (ML) SUBCUTANEOUS
Refills: 0 | Status: DISCONTINUED | OUTPATIENT
Start: 2023-07-29 | End: 2023-08-03

## 2023-07-29 RX ORDER — OXYCODONE AND ACETAMINOPHEN 5; 325 MG/1; MG/1
1 TABLET ORAL EVERY 6 HOURS
Refills: 0 | Status: DISCONTINUED | OUTPATIENT
Start: 2023-07-29 | End: 2023-08-03

## 2023-07-29 RX ORDER — CEFTRIAXONE 500 MG/1
1000 INJECTION, POWDER, FOR SOLUTION INTRAMUSCULAR; INTRAVENOUS EVERY 24 HOURS
Refills: 0 | Status: DISCONTINUED | OUTPATIENT
Start: 2023-07-30 | End: 2023-07-31

## 2023-07-29 RX ORDER — CEFTRIAXONE 500 MG/1
1000 INJECTION, POWDER, FOR SOLUTION INTRAMUSCULAR; INTRAVENOUS ONCE
Refills: 0 | Status: COMPLETED | OUTPATIENT
Start: 2023-07-29 | End: 2023-07-29

## 2023-07-29 RX ORDER — AZITHROMYCIN 500 MG/1
500 TABLET, FILM COATED ORAL EVERY 24 HOURS
Refills: 0 | Status: DISCONTINUED | OUTPATIENT
Start: 2023-07-29 | End: 2023-07-31

## 2023-07-29 RX ORDER — COLCHICINE 0.6 MG
0.6 TABLET ORAL DAILY
Refills: 0 | Status: DISCONTINUED | OUTPATIENT
Start: 2023-07-30 | End: 2023-08-02

## 2023-07-29 RX ORDER — CEFTRIAXONE 500 MG/1
INJECTION, POWDER, FOR SOLUTION INTRAMUSCULAR; INTRAVENOUS
Refills: 0 | Status: DISCONTINUED | OUTPATIENT
Start: 2023-07-29 | End: 2023-07-31

## 2023-07-29 RX ADMIN — PANTOPRAZOLE SODIUM 40 MILLIGRAM(S): 20 TABLET, DELAYED RELEASE ORAL at 06:06

## 2023-07-29 RX ADMIN — Medication 40 MILLIGRAM(S): at 06:05

## 2023-07-29 RX ADMIN — INSULIN GLARGINE 12 UNIT(S): 100 INJECTION, SOLUTION SUBCUTANEOUS at 21:50

## 2023-07-29 RX ADMIN — Medication 1 PATCH: at 07:06

## 2023-07-29 RX ADMIN — BUDESONIDE AND FORMOTEROL FUMARATE DIHYDRATE 2 PUFF(S): 160; 4.5 AEROSOL RESPIRATORY (INHALATION) at 06:06

## 2023-07-29 RX ADMIN — Medication 10 MILLIGRAM(S): at 21:50

## 2023-07-29 RX ADMIN — Medication 80 MILLIGRAM(S): at 17:21

## 2023-07-29 RX ADMIN — CHLORHEXIDINE GLUCONATE 1 APPLICATION(S): 213 SOLUTION TOPICAL at 06:07

## 2023-07-29 RX ADMIN — Medication 10 MILLIGRAM(S): at 13:06

## 2023-07-29 RX ADMIN — Medication 100 MILLIGRAM(S): at 13:06

## 2023-07-29 RX ADMIN — ATORVASTATIN CALCIUM 40 MILLIGRAM(S): 80 TABLET, FILM COATED ORAL at 21:50

## 2023-07-29 RX ADMIN — Medication 5 MG/HR: at 21:50

## 2023-07-29 RX ADMIN — Medication 3 MILLILITER(S): at 00:30

## 2023-07-29 RX ADMIN — Medication 0.6 MILLIGRAM(S): at 21:55

## 2023-07-29 RX ADMIN — Medication 4 UNIT(S): at 11:50

## 2023-07-29 RX ADMIN — Medication 3 MILLILITER(S): at 06:06

## 2023-07-29 RX ADMIN — Medication 1 PATCH: at 10:56

## 2023-07-29 RX ADMIN — Medication 1 PATCH: at 11:02

## 2023-07-29 RX ADMIN — Medication 3 MILLILITER(S): at 11:02

## 2023-07-29 RX ADMIN — BUDESONIDE AND FORMOTEROL FUMARATE DIHYDRATE 2 PUFF(S): 160; 4.5 AEROSOL RESPIRATORY (INHALATION) at 17:22

## 2023-07-29 RX ADMIN — Medication 10 MILLIGRAM(S): at 06:06

## 2023-07-29 RX ADMIN — Medication 3 MILLILITER(S): at 17:22

## 2023-07-29 RX ADMIN — Medication 100 MILLIGRAM(S): at 21:51

## 2023-07-29 RX ADMIN — Medication 100 MILLIGRAM(S): at 06:07

## 2023-07-29 RX ADMIN — Medication 80 MILLIGRAM(S): at 06:06

## 2023-07-29 RX ADMIN — APIXABAN 5 MILLIGRAM(S): 2.5 TABLET, FILM COATED ORAL at 21:49

## 2023-07-29 RX ADMIN — TIOTROPIUM BROMIDE 2 PUFF(S): 18 CAPSULE ORAL; RESPIRATORY (INHALATION) at 11:02

## 2023-07-29 RX ADMIN — Medication 40 MILLIGRAM(S): at 17:22

## 2023-07-29 RX ADMIN — Medication 1: at 11:50

## 2023-07-29 RX ADMIN — Medication 1 PATCH: at 19:15

## 2023-07-29 RX ADMIN — APIXABAN 5 MILLIGRAM(S): 2.5 TABLET, FILM COATED ORAL at 08:27

## 2023-07-29 NOTE — PROGRESS NOTE ADULT - ASSESSMENT
62 yo woman admitted for Afib ablation, post procedure developed diffuse rales and fluid overload.   ptn transferred to my service 2/2 acute hypoxic respiratory failure, systolic  R heart failure and diastolic L heart failure.. awaiting HF consult, s/p Afib ablation, now in sinus, on ELiquis. ptn is a chronic smoker, h/o PPM 2/2 AVB. ptn also c/o pleuritic CP " ever since i woke up from anesthesia" ptn states she is on home O2,, 2LNC. She is not on any COPD meds/inhalers, has a pulmonologist, smokes 1 PPD x50 yrs.  ptn is noted to have LE erythema, she has tend over LE    A/P  TTE c/w biventricular HF,   CTA chest: no PE, has bilateral extensive infiltrates. will add ABx,. will get pulm and ID input   LE dopplers neg for DVT  ongoing volume overload,   she is lethargic and dyspneic though overall is a little better. ptn states her breathing is short post ablation. will get a CXR  will change Lasix 80 bid to LAsix drip.   RHC/LHC when more euvolemic.   hyperglycemic 2/2 Steroids, will start tapering off. change iv Medrol to prednisone taper  will dc Abx ( cellulitis LE) after 1 week. ptn also has varicose veins could contribute to erythema 2/2 superficial thrombophlebitis.     cont  symbicort and spirivachloé  ordered PYP scan to R/O cardiac amyloid when able to lie flat  - cont statin, Eliquis    GI ppx w PPI

## 2023-07-29 NOTE — PROGRESS NOTE ADULT - SUBJECTIVE AND OBJECTIVE BOX
Chief complaint  Patient is a 63y old  Female who presents with a chief complaint of shortness of breath (28 Jul 2023 12:33)         Labs and Fingersticks  CAPILLARY BLOOD GLUCOSE      POCT Blood Glucose.: 124 mg/dL (29 Jul 2023 07:25)  POCT Blood Glucose.: 201 mg/dL (28 Jul 2023 21:31)  POCT Blood Glucose.: 128 mg/dL (28 Jul 2023 17:24)  POCT Blood Glucose.: 163 mg/dL (28 Jul 2023 11:46)      Anion Gap: 12 (07-29 @ 06:44)  Anion Gap: 13 (07-28 @ 14:09)  Anion Gap: 16 (07-28 @ 06:33)      Calcium: 10.9 *H* (07-29 @ 06:44)  Calcium: 10.1 (07-28 @ 14:09)  Calcium: 8.8 (07-28 @ 06:33)  Albumin: 4.4 (07-29 @ 06:44)  Albumin: 4.0 (07-28 @ 06:33)    Alanine Aminotransferase (ALT/SGPT): 9 *L* (07-29 @ 06:44)  Alanine Aminotransferase (ALT/SGPT): 10 (07-28 @ 06:33)  Alkaline Phosphatase: 104 (07-29 @ 06:44)  Alkaline Phosphatase: 105 (07-28 @ 06:33)  Aspartate Aminotransferase (AST/SGOT): 17 (07-29 @ 06:44)  Aspartate Aminotransferase (AST/SGOT): 26 (07-28 @ 06:33)        07-29    134<L>  |  90<L>  |  21  ----------------------------<  109<H>  4.7   |  32<H>  |  0.45<L>    Ca    10.9<H>      29 Jul 2023 06:44  Phos  2.3     07-28  Mg     1.7     07-28    TPro  7.8  /  Alb  4.4  /  TBili  0.8  /  DBili  x   /  AST  17  /  ALT  9<L>  /  AlkPhos  104  07-29                        14.1   10.94 )-----------( 117      ( 29 Jul 2023 06:43 )             45.5     Medications  MEDICATIONS  (STANDING):  albuterol/ipratropium for Nebulization 3 milliLiter(s) Nebulizer every 6 hours  apixaban 5 milliGRAM(s) Oral every 12 hours  atorvastatin 40 milliGRAM(s) Oral at bedtime  budesonide 160 MICROgram(s)/formoterol 4.5 MICROgram(s) Inhaler 2 Puff(s) Inhalation two times a day  ceFAZolin   IVPB      ceFAZolin   IVPB 1000 milliGRAM(s) IV Intermittent every 8 hours  chlorhexidine 2% Cloths 1 Application(s) Topical <User Schedule>  furosemide   Injectable 80 milliGRAM(s) IV Push every 12 hours  hydrALAZINE 10 milliGRAM(s) Oral three times a day  insulin glargine Injectable (LANTUS) 12 Unit(s) SubCutaneous at bedtime  insulin lispro (ADMELOG) corrective regimen sliding scale   SubCutaneous three times a day before meals  insulin lispro Injectable (ADMELOG) 4 Unit(s) SubCutaneous three times a day before meals  methylPREDNISolone sodium succinate Injectable 40 milliGRAM(s) IV Push every 12 hours  nicotine -   7 mG/24Hr(s) Patch 1 Patch Transdermal daily  pantoprazole    Tablet 40 milliGRAM(s) Oral before breakfast  tiotropium 2.5 MICROgram(s) Inhaler 2 Puff(s) Inhalation daily      Physical Exam  General: Patient comfortable in bed   Vital Signs Last 12 Hrs  T(F): 98.3 (07-29-23 @ 08:05), Max: 98.9 (07-29-23 @ 04:27)  HR: 84 (07-29-23 @ 08:05) (69 - 84)  BP: 125/69 (07-29-23 @ 08:05) (121/72 - 125/69)  BP(mean): --  RR: 18 (07-29-23 @ 08:05) (18 - 18)  SpO2: 93% (07-29-23 @ 08:05) (93% - 95%)    CVS: S1S2   Respiratory: No wheezing, no crepitations  GI: Abdomen soft, bowel sounds positive  Musculoskeletal:  moves all extremities  : Voiding

## 2023-07-29 NOTE — PROGRESS NOTE ADULT - ASSESSMENT
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 Referred for Afib ablation with Dr. Roca.   Assessment  Hyperglycemias: Steroid induced, a1c 5.6%, on IV Methylprednisolone, eating meals, started on insulin.   Had steroid induced hyperglycemias  Smoker: shortness of breath, on O2, started on steroids.   HTN/CHF: on antihypertensive medications, monitored, asymptomatic.      Discussed plan and management with Attending   Roger Woods NP - TEAMS  Dr Sebas Childers  [phone]

## 2023-07-29 NOTE — PROGRESS NOTE ADULT - SUBJECTIVE AND OBJECTIVE BOX
Patient is a 63y old  Female who presents with a chief complaint of shortness of breath (28 Jul 2023 12:33)      SUBJECTIVE / OVERNIGHT EVENTS: ptn w ongoing volume overload, she is lethargic and dyspneic though overall is a little better. will change Lasix 80 bid to LAsix drip. RHC/LHC when more euvolemic. hyperglycemic 2/2 Steroids, will start tapering off. will dc Abx after 1 week. ptn also has varicose veins could contribute to erythema 2/2 superficial thrombophlebitis.     MEDICATIONS  (STANDING):  albuterol/ipratropium for Nebulization 3 milliLiter(s) Nebulizer every 6 hours  apixaban 5 milliGRAM(s) Oral every 12 hours  atorvastatin 40 milliGRAM(s) Oral at bedtime  budesonide 160 MICROgram(s)/formoterol 4.5 MICROgram(s) Inhaler 2 Puff(s) Inhalation two times a day  ceFAZolin   IVPB 1000 milliGRAM(s) IV Intermittent every 8 hours  ceFAZolin   IVPB      chlorhexidine 2% Cloths 1 Application(s) Topical <User Schedule>  furosemide Infusion 10 mG/Hr (5 mL/Hr) IV Continuous <Continuous>  hydrALAZINE 10 milliGRAM(s) Oral three times a day  insulin glargine Injectable (LANTUS) 12 Unit(s) SubCutaneous at bedtime  insulin lispro (ADMELOG) corrective regimen sliding scale   SubCutaneous three times a day before meals  insulin lispro Injectable (ADMELOG) 4 Unit(s) SubCutaneous three times a day before meals  methylPREDNISolone sodium succinate Injectable 40 milliGRAM(s) IV Push every 12 hours  nicotine -   7 mG/24Hr(s) Patch 1 Patch Transdermal daily  pantoprazole    Tablet 40 milliGRAM(s) Oral before breakfast  tiotropium 2.5 MICROgram(s) Inhaler 2 Puff(s) Inhalation daily    MEDICATIONS  (PRN):  acetaminophen     Tablet .. 650 milliGRAM(s) Oral every 6 hours PRN Temp greater or equal to 38C (100.4F), Moderate Pain (4 - 6)  ipratropium  for Nebulization. 500 MICROGram(s) Nebulizer once PRN Shortness of Breath  oxycodone    5 mG/acetaminophen 325 mG 1 Tablet(s) Oral every 6 hours PRN back pain      Vital Signs Last 24 Hrs  T(F): 98.1 (07-29-23 @ 20:34), Max: 98.9 (07-29-23 @ 04:27)  HR: 105 (07-29-23 @ 20:34) (69 - 105)  BP: 121/73 (07-29-23 @ 20:34) (116/67 - 125/69)  RR: 18 (07-29-23 @ 20:34) (18 - 18)  SpO2: 93% (07-29-23 @ 20:34) (93% - 96%)  Telemetry:   CAPILLARY BLOOD GLUCOSE      POCT Blood Glucose.: 186 mg/dL (29 Jul 2023 21:26)  POCT Blood Glucose.: 139 mg/dL (29 Jul 2023 17:17)  POCT Blood Glucose.: 151 mg/dL (29 Jul 2023 11:32)  POCT Blood Glucose.: 124 mg/dL (29 Jul 2023 07:25)    I&O's Summary    28 Jul 2023 07:01  -  29 Jul 2023 07:00  --------------------------------------------------------  IN: 530 mL / OUT: 2125 mL / NET: -1595 mL    29 Jul 2023 07:01  -  29 Jul 2023 22:41  --------------------------------------------------------  IN: 530 mL / OUT: 1800 mL / NET: -1270 mL        PHYSICAL EXAM:  GENERAL: NAD, well-developed  HEAD:  Atraumatic, Normocephalic  EYES: EOMI, PERRLA, conjunctiva and sclera clear  NECK: Supple, No JVD  CHEST/LUNG: Clear to auscultation bilaterally; No wheeze  HEART: Regular rate and rhythm; No murmurs, rubs, or gallops  ABDOMEN: Soft, Nontender, Nondistended; Bowel sounds present  EXTREMITIES:  2+ Peripheral Pulses, No clubbing, cyanosis, or edema  PSYCH: AAOx3  NEUROLOGY: non-focal  SKIN: No rashes or lesions    LABS:                        14.1   10.94 )-----------( 117      ( 29 Jul 2023 06:43 )             45.5     07-29    134<L>  |  90<L>  |  21  ----------------------------<  109<H>  4.7   |  32<H>  |  0.45<L>    Ca    10.9<H>      29 Jul 2023 06:44  Phos  2.3     07-28  Mg     1.7     07-28    TPro  7.8  /  Alb  4.4  /  TBili  0.8  /  DBili  x   /  AST  17  /  ALT  9<L>  /  AlkPhos  104  07-29    PT/INR - ( 28 Jul 2023 06:33 )   PT: 20.4 sec;   INR: 1.89 ratio               Urinalysis Basic - ( 29 Jul 2023 06:44 )    Color: x / Appearance: x / SG: x / pH: x  Gluc: 109 mg/dL / Ketone: x  / Bili: x / Urobili: x   Blood: x / Protein: x / Nitrite: x   Leuk Esterase: x / RBC: x / WBC x   Sq Epi: x / Non Sq Epi: x / Bacteria: x        RADIOLOGY & ADDITIONAL TESTS:    Imaging Personally Reviewed:    Consultant(s) Notes Reviewed:      Care Discussed with Consultants/Other Providers:

## 2023-07-29 NOTE — PROGRESS NOTE ADULT - ASSESSMENT
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 Lasix up to 80mg IV bid, monitor I/O  - Continue AC with Eliquis 5mg bid  - Appreciate Cardiology/HF input  - Has f/up appt with Dr Roca on 9/12/23 at 9:30am  - Continue telemetry monitoring    #[phone]

## 2023-07-29 NOTE — PROGRESS NOTE ADULT - ASSESSMENT
ECHO 7/27/23: EF 63%; nl LV sys fx,  no regional wall motion abnormalities seen. severe (grade 3) left ventricular diastolic dysfunction, with elevated filling pressure.Estimated pulmonary artery systolic pressure is 54 mmHg. The right atrium is severely dilated. The left atrium is severely dilated   ARSH 7/26/23:  No pericardial effusion seen. Moderate tricuspid regurgitation. Estimated pulmonary artery systolic pressure is 51 mmHg, consistent with moderate pulmonary hypertension. Left ventricular endocardium is not well visualized; however, the left ventricular systolic function appears grossly normal.  NM PET/CT Myocardial Sarcoidosis FDG (04.20.23 @ 13:19) >  OTHER STUDIES USED FOR CORRELATION: None  IMPRESSION: FDG PET/CT of the heart shows no evidence of FDG-avid   myocardial inflammation. Diffuse myocardial hypermetabolism seen on prior   study dated 4/6/2023, is not seen on the current study, and most likely   was due to inadequate dietary preparation.    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 Now referred for Afib Ablation.  she is now SP AF ablation and admitted with acute CHF     # Afib s/p afib ablation on 7/26  - EP f/u  - in nsr   - ac on eliquis     #acute on chronic Diastolic CHF   - sp ablation went into acute CHF   - improving  - chest xray note d  - HF team eval noted- c.w lasix 80 mg IVP BID   - Outpt PET/CT in April 2023 w/o evidence of Sarcoid.   - TTE reveal Biatrial enlargement ?amyloid  - likely need ischemic eval once volume optimized   - le doppler neg for dvt     # AVB s/p PPM   -stable, ep  following     #COPD  -pulm fu     45 minutes spent on total encounter; more than 50% of the visit was spent counseling and/or coordinating care by the attending physician.

## 2023-07-29 NOTE — PROGRESS NOTE ADULT - SUBJECTIVE AND OBJECTIVE BOX
24H hour events:   Seen sitting in chair, family at bedside; feels weak and breathing improving; tele remains in sinus rhythm overnight, rates of 70-80s    MEDICATIONS:  apixaban 5 milliGRAM(s) Oral every 12 hours  furosemide   Injectable 80 milliGRAM(s) IV Push every 12 hours  hydrALAZINE 10 milliGRAM(s) Oral three times a day  ceFAZolin   IVPB 1000 milliGRAM(s) IV Intermittent every 8 hours  ceFAZolin   IVPB      albuterol/ipratropium for Nebulization 3 milliLiter(s) Nebulizer every 6 hours  budesonide 160 MICROgram(s)/formoterol 4.5 MICROgram(s) Inhaler 2 Puff(s) Inhalation two times a day  ipratropium  for Nebulization. 500 MICROGram(s) Nebulizer once PRN  tiotropium 2.5 MICROgram(s) Inhaler 2 Puff(s) Inhalation daily  acetaminophen     Tablet .. 650 milliGRAM(s) Oral every 6 hours PRN  pantoprazole    Tablet 40 milliGRAM(s) Oral before breakfast  atorvastatin 40 milliGRAM(s) Oral at bedtime  insulin glargine Injectable (LANTUS) 12 Unit(s) SubCutaneous at bedtime  insulin lispro (ADMELOG) corrective regimen sliding scale   SubCutaneous three times a day before meals  insulin lispro Injectable (ADMELOG) 4 Unit(s) SubCutaneous three times a day before meals  methylPREDNISolone sodium succinate Injectable 40 milliGRAM(s) IV Push every 12 hours  chlorhexidine 2% Cloths 1 Application(s) Topical <User Schedule>      REVIEW OF SYSTEMS:  Complete 12point ROS negative except as noted above    PHYSICAL EXAM:  T(C): 36.8 (07-29-23 @ 08:05), Max: 37.2 (07-29-23 @ 04:27)  HR: 84 (07-29-23 @ 08:05) (69 - 84)  BP: 125/69 (07-29-23 @ 08:05) (118/71 - 144/77)  RR: 18 (07-29-23 @ 08:05) (18 - 18)  SpO2: 93% (07-29-23 @ 08:05) (90% - 95%)  Wt(kg): --  I&O's Summary    28 Jul 2023 07:01  -  29 Jul 2023 07:00  --------------------------------------------------------  IN: 530 mL / OUT: 2125 mL / NET: -1595 mL        Appearance: well develop, appears comfortable	  Head: normocephalic, atraumatic  Neck: supple  Cardiovascular: RRR S1 S2, no m/r/g  Respiratory: Bibasilar crackles noted; nasal cannula	  Psychiatry: A & O x 3, Mood & affect appropriate  Gastrointestinal:  Soft, Non-tender, + BS	  : voiding  Skin: No rashes, No ecchymoses  Neurologic: Non-focal  Extremities: CHÁVEZ, no c/c/e  Vascular: Peripheral pulses palpable 2+ bilaterally        LABS:	 	    CBC Full  -  ( 29 Jul 2023 06:43 )  WBC Count : 10.94 K/uL  Hemoglobin : 14.1 g/dL  Hematocrit : 45.5 %  Platelet Count - Automated : 117 K/uL  Mean Cell Volume : 96.6 fl  Mean Cell Hemoglobin : 29.9 pg  Mean Cell Hemoglobin Concentration : 31.0 gm/dL  Auto Neutrophil # : x  Auto Lymphocyte # : x  Auto Monocyte # : x  Auto Eosinophil # : x  Auto Basophil # : x  Auto Neutrophil % : x  Auto Lymphocyte % : x  Auto Monocyte % : x  Auto Eosinophil % : x  Auto Basophil % : x    07-29    134<L>  |  90<L>  |  21  ----------------------------<  109<H>  4.7   |  32<H>  |  0.45<L>  07-28    137  |  96  |  22  ----------------------------<  163<H>  4.5   |  28  |  0.52    Ca    10.9<H>      29 Jul 2023 06:44  Ca    10.1      28 Jul 2023 14:09  Phos  2.3     07-28  Mg     1.7     07-28    TPro  7.8  /  Alb  4.4  /  TBili  0.8  /  DBili  x   /  AST  17  /  ALT  9<L>  /  AlkPhos  104  07-29  TPro  7.0  /  Alb  4.0  /  TBili  0.5  /  DBili  0.2  /  AST  26  /  ALT  10  /  AlkPhos  105  07-28      proBNP:   Lipid Profile:   HgA1c:   TSH:       CARDIAC MARKERS:      TELEMETRY: NSR 70-80s

## 2023-07-29 NOTE — PROGRESS NOTE ADULT - SUBJECTIVE AND OBJECTIVE BOX
CARDIOLOGY FOLLOW UP - Dr. Johnson  Date of Service: 7/29/2023  CC: feeling better, still sob, daughter at bedside    Review of Systems:  Constitutional: No fever, weight loss, or fatigue  Respiratory: No cough, wheezing, or hemoptysis, no shortness of breath  Cardiovascular: No chest pain, palpitations, passing out, dizziness, or leg swelling  Gastrointestinal: No abd or epigastric pain. No nausea, vomiting, or hematemesis; no diarrhea or consiptaiton, no melena or hematochezia  Vascular: No edema     TELEMETRY:    PHYSICAL EXAM:  T(C): 37.2 (07-29-23 @ 04:27), Max: 37.2 (07-29-23 @ 04:27)  HR: 69 (07-29-23 @ 04:27) (69 - 83)  BP: 121/72 (07-29-23 @ 04:27) (118/71 - 144/77)  RR: 18 (07-29-23 @ 04:27) (18 - 18)  SpO2: 95% (07-29-23 @ 04:27) (90% - 95%)  Wt(kg): --  I&O's Summary    28 Jul 2023 07:01  -  29 Jul 2023 07:00  --------------------------------------------------------  IN: 530 mL / OUT: 2125 mL / NET: -1595 mL        Appearance: Normal	  Cardiovascular: Normal S1 S2,RRR, No JVD, No murmurs  Respiratory: Lungs clear to auscultation	  Gastrointestinal:  Soft, Non-tender, + BS	  Extremities: Normal range of motion, No clubbing, cyanosis or edema  Vascular: Peripheral pulses palpable 2+ bilaterally       Home Medications:  Eliquis 5 mg oral tablet: 1 orally 2 times a day (26 Jul 2023 11:39)  Lasix 40 mg oral tablet: 1 orally once a day (26 Jul 2023 11:42)  simvastatin 20 mg oral tablet: 1 orally once a day (26 Jul 2023 11:42)        MEDICATIONS  (STANDING):  albuterol/ipratropium for Nebulization 3 milliLiter(s) Nebulizer every 6 hours  apixaban 5 milliGRAM(s) Oral every 12 hours  atorvastatin 40 milliGRAM(s) Oral at bedtime  budesonide 160 MICROgram(s)/formoterol 4.5 MICROgram(s) Inhaler 2 Puff(s) Inhalation two times a day  ceFAZolin   IVPB      ceFAZolin   IVPB 1000 milliGRAM(s) IV Intermittent every 8 hours  chlorhexidine 2% Cloths 1 Application(s) Topical <User Schedule>  furosemide   Injectable 80 milliGRAM(s) IV Push every 12 hours  hydrALAZINE 10 milliGRAM(s) Oral three times a day  insulin glargine Injectable (LANTUS) 12 Unit(s) SubCutaneous at bedtime  insulin lispro (ADMELOG) corrective regimen sliding scale   SubCutaneous three times a day before meals  insulin lispro Injectable (ADMELOG) 4 Unit(s) SubCutaneous three times a day before meals  methylPREDNISolone sodium succinate Injectable 40 milliGRAM(s) IV Push every 12 hours  nicotine -   7 mG/24Hr(s) Patch 1 Patch Transdermal daily  pantoprazole    Tablet 40 milliGRAM(s) Oral before breakfast  tiotropium 2.5 MICROgram(s) Inhaler 2 Puff(s) Inhalation daily        EKG:  RADIOLOGY:  DIAGNOSTIC TESTING:  [ ] Echocardiogram:  [ ] Catherterization:  [ ] Stress Test:  OTHER:     LABS:	 	                          14.1   10.94 )-----------( 117      ( 29 Jul 2023 06:43 )             45.5     07-29    134<L>  |  90<L>  |  21  ----------------------------<  109<H>  4.7   |  32<H>  |  0.45<L>    Ca    10.9<H>      29 Jul 2023 06:44  Phos  2.3     07-28  Mg     1.7     07-28    TPro  7.8  /  Alb  4.4  /  TBili  0.8  /  DBili  x   /  AST  17  /  ALT  9<L>  /  AlkPhos  104  07-29      PT/INR - ( 28 Jul 2023 06:33 )   PT: 20.4 sec;   INR: 1.89 ratio             CARDIAC MARKERS:

## 2023-07-30 LAB
ALBUMIN SERPL ELPH-MCNC: 4.3 G/DL — SIGNIFICANT CHANGE UP (ref 3.3–5)
ALP SERPL-CCNC: 96 U/L — SIGNIFICANT CHANGE UP (ref 40–120)
ALT FLD-CCNC: 8 U/L — LOW (ref 10–45)
ANION GAP SERPL CALC-SCNC: 9 MMOL/L — SIGNIFICANT CHANGE UP (ref 5–17)
AST SERPL-CCNC: 11 U/L — SIGNIFICANT CHANGE UP (ref 10–40)
BILIRUB SERPL-MCNC: 0.6 MG/DL — SIGNIFICANT CHANGE UP (ref 0.2–1.2)
BUN SERPL-MCNC: 22 MG/DL — SIGNIFICANT CHANGE UP (ref 7–23)
CALCIUM SERPL-MCNC: 10.4 MG/DL — SIGNIFICANT CHANGE UP (ref 8.4–10.5)
CHLORIDE SERPL-SCNC: 90 MMOL/L — LOW (ref 96–108)
CO2 SERPL-SCNC: 37 MMOL/L — HIGH (ref 22–31)
CREAT SERPL-MCNC: 0.46 MG/DL — LOW (ref 0.5–1.3)
EGFR: 108 ML/MIN/1.73M2 — SIGNIFICANT CHANGE UP
GLUCOSE BLDC GLUCOMTR-MCNC: 146 MG/DL — HIGH (ref 70–99)
GLUCOSE BLDC GLUCOMTR-MCNC: 152 MG/DL — HIGH (ref 70–99)
GLUCOSE BLDC GLUCOMTR-MCNC: 157 MG/DL — HIGH (ref 70–99)
GLUCOSE BLDC GLUCOMTR-MCNC: 162 MG/DL — HIGH (ref 70–99)
GLUCOSE SERPL-MCNC: 161 MG/DL — HIGH (ref 70–99)
HCT VFR BLD CALC: 44 % — SIGNIFICANT CHANGE UP (ref 34.5–45)
HGB BLD-MCNC: 13.9 G/DL — SIGNIFICANT CHANGE UP (ref 11.5–15.5)
MAGNESIUM SERPL-MCNC: 2 MG/DL — SIGNIFICANT CHANGE UP (ref 1.6–2.6)
MCHC RBC-ENTMCNC: 30.2 PG — SIGNIFICANT CHANGE UP (ref 27–34)
MCHC RBC-ENTMCNC: 31.6 GM/DL — LOW (ref 32–36)
MCV RBC AUTO: 95.4 FL — SIGNIFICANT CHANGE UP (ref 80–100)
NRBC # BLD: 0 /100 WBCS — SIGNIFICANT CHANGE UP (ref 0–0)
PHOSPHATE SERPL-MCNC: 1.5 MG/DL — LOW (ref 2.5–4.5)
PLATELET # BLD AUTO: 130 K/UL — LOW (ref 150–400)
POTASSIUM SERPL-MCNC: 3.8 MMOL/L — SIGNIFICANT CHANGE UP (ref 3.5–5.3)
POTASSIUM SERPL-SCNC: 3.8 MMOL/L — SIGNIFICANT CHANGE UP (ref 3.5–5.3)
PROT SERPL-MCNC: 7.7 G/DL — SIGNIFICANT CHANGE UP (ref 6–8.3)
RBC # BLD: 4.61 M/UL — SIGNIFICANT CHANGE UP (ref 3.8–5.2)
RBC # FLD: 12.9 % — SIGNIFICANT CHANGE UP (ref 10.3–14.5)
SODIUM SERPL-SCNC: 136 MMOL/L — SIGNIFICANT CHANGE UP (ref 135–145)
WBC # BLD: 10.07 K/UL — SIGNIFICANT CHANGE UP (ref 3.8–10.5)
WBC # FLD AUTO: 10.07 K/UL — SIGNIFICANT CHANGE UP (ref 3.8–10.5)

## 2023-07-30 PROCEDURE — 99222 1ST HOSP IP/OBS MODERATE 55: CPT

## 2023-07-30 PROCEDURE — 93010 ELECTROCARDIOGRAM REPORT: CPT | Mod: 76

## 2023-07-30 RX ORDER — INSULIN GLARGINE 100 [IU]/ML
15 INJECTION, SOLUTION SUBCUTANEOUS AT BEDTIME
Refills: 0 | Status: DISCONTINUED | OUTPATIENT
Start: 2023-07-30 | End: 2023-07-31

## 2023-07-30 RX ORDER — POTASSIUM PHOSPHATE, MONOBASIC POTASSIUM PHOSPHATE, DIBASIC 236; 224 MG/ML; MG/ML
30 INJECTION, SOLUTION INTRAVENOUS ONCE
Refills: 0 | Status: COMPLETED | OUTPATIENT
Start: 2023-07-30 | End: 2023-07-30

## 2023-07-30 RX ADMIN — Medication 1 PATCH: at 12:54

## 2023-07-30 RX ADMIN — CHLORHEXIDINE GLUCONATE 1 APPLICATION(S): 213 SOLUTION TOPICAL at 05:32

## 2023-07-30 RX ADMIN — ATORVASTATIN CALCIUM 40 MILLIGRAM(S): 80 TABLET, FILM COATED ORAL at 21:25

## 2023-07-30 RX ADMIN — CEFTRIAXONE 1000 MILLIGRAM(S): 500 INJECTION, POWDER, FOR SOLUTION INTRAMUSCULAR; INTRAVENOUS at 00:27

## 2023-07-30 RX ADMIN — Medication 1: at 18:03

## 2023-07-30 RX ADMIN — Medication 4 UNIT(S): at 18:04

## 2023-07-30 RX ADMIN — Medication 1 PATCH: at 19:15

## 2023-07-30 RX ADMIN — Medication 500 MICROGRAM(S): at 12:37

## 2023-07-30 RX ADMIN — Medication 1 PATCH: at 12:42

## 2023-07-30 RX ADMIN — Medication 1 PATCH: at 08:39

## 2023-07-30 RX ADMIN — APIXABAN 5 MILLIGRAM(S): 2.5 TABLET, FILM COATED ORAL at 21:25

## 2023-07-30 RX ADMIN — Medication 10 MILLIGRAM(S): at 05:32

## 2023-07-30 RX ADMIN — Medication 0.6 MILLIGRAM(S): at 12:42

## 2023-07-30 RX ADMIN — Medication 10 MILLIGRAM(S): at 21:24

## 2023-07-30 RX ADMIN — POTASSIUM PHOSPHATE, MONOBASIC POTASSIUM PHOSPHATE, DIBASIC 83.33 MILLIMOLE(S): 236; 224 INJECTION, SOLUTION INTRAVENOUS at 05:52

## 2023-07-30 RX ADMIN — Medication 40 MILLIGRAM(S): at 05:33

## 2023-07-30 RX ADMIN — Medication 4 UNIT(S): at 08:35

## 2023-07-30 RX ADMIN — INSULIN GLARGINE 15 UNIT(S): 100 INJECTION, SOLUTION SUBCUTANEOUS at 22:11

## 2023-07-30 RX ADMIN — BUDESONIDE AND FORMOTEROL FUMARATE DIHYDRATE 2 PUFF(S): 160; 4.5 AEROSOL RESPIRATORY (INHALATION) at 05:32

## 2023-07-30 RX ADMIN — PANTOPRAZOLE SODIUM 40 MILLIGRAM(S): 20 TABLET, DELAYED RELEASE ORAL at 08:35

## 2023-07-30 RX ADMIN — Medication 1: at 12:36

## 2023-07-30 RX ADMIN — Medication 4 UNIT(S): at 12:36

## 2023-07-30 RX ADMIN — Medication 3 MILLILITER(S): at 18:04

## 2023-07-30 RX ADMIN — Medication 3 MILLILITER(S): at 12:37

## 2023-07-30 RX ADMIN — Medication 1: at 08:32

## 2023-07-30 RX ADMIN — APIXABAN 5 MILLIGRAM(S): 2.5 TABLET, FILM COATED ORAL at 08:37

## 2023-07-30 RX ADMIN — Medication 3 MILLILITER(S): at 00:22

## 2023-07-30 RX ADMIN — Medication 3 MILLILITER(S): at 05:32

## 2023-07-30 RX ADMIN — BUDESONIDE AND FORMOTEROL FUMARATE DIHYDRATE 2 PUFF(S): 160; 4.5 AEROSOL RESPIRATORY (INHALATION) at 18:05

## 2023-07-30 RX ADMIN — AZITHROMYCIN 255 MILLIGRAM(S): 500 TABLET, FILM COATED ORAL at 05:42

## 2023-07-30 RX ADMIN — TIOTROPIUM BROMIDE 2 PUFF(S): 18 CAPSULE ORAL; RESPIRATORY (INHALATION) at 12:44

## 2023-07-30 RX ADMIN — Medication 10 MILLIGRAM(S): at 14:08

## 2023-07-30 NOTE — PROGRESS NOTE ADULT - SUBJECTIVE AND OBJECTIVE BOX
Patient is a 63y old  Female who presents with a chief complaint of shortness of breath (28 Jul 2023 12:33)      SUBJECTIVE / OVERNIGHT EVENTS: better urine output on lasix drip, feels less dyspneic, on colchicine post ablation, pleuritic CP resolved, on Abx for PNA, seen by ID, suggested to stop ABx, pulm abn on chest CT presumed to be atelectasis. will stop and observe off ABx    MEDICATIONS  (STANDING):  albuterol/ipratropium for Nebulization 3 milliLiter(s) Nebulizer every 6 hours  apixaban 5 milliGRAM(s) Oral every 12 hours  atorvastatin 40 milliGRAM(s) Oral at bedtime  azithromycin  IVPB 500 milliGRAM(s) IV Intermittent every 24 hours  budesonide 160 MICROgram(s)/formoterol 4.5 MICROgram(s) Inhaler 2 Puff(s) Inhalation two times a day  cefTRIAXone   IVPB 1000 milliGRAM(s) IV Intermittent every 24 hours  cefTRIAXone   IVPB      chlorhexidine 2% Cloths 1 Application(s) Topical <User Schedule>  colchicine 0.6 milliGRAM(s) Oral daily  furosemide Infusion 10 mG/Hr (5 mL/Hr) IV Continuous <Continuous>  hydrALAZINE 10 milliGRAM(s) Oral three times a day  insulin glargine Injectable (LANTUS) 15 Unit(s) SubCutaneous at bedtime  insulin lispro (ADMELOG) corrective regimen sliding scale   SubCutaneous three times a day before meals  insulin lispro Injectable (ADMELOG) 4 Unit(s) SubCutaneous three times a day before meals  nicotine -   7 mG/24Hr(s) Patch 1 Patch Transdermal daily  pantoprazole    Tablet 40 milliGRAM(s) Oral before breakfast  tiotropium 2.5 MICROgram(s) Inhaler 2 Puff(s) Inhalation daily    MEDICATIONS  (PRN):  acetaminophen     Tablet .. 650 milliGRAM(s) Oral every 6 hours PRN Temp greater or equal to 38C (100.4F), Moderate Pain (4 - 6)  oxycodone    5 mG/acetaminophen 325 mG 1 Tablet(s) Oral every 6 hours PRN back pain      Vital Signs Last 24 Hrs  T(F): 98.1 (07-30-23 @ 11:20), Max: 98.3 (07-30-23 @ 03:46)  HR: 73 (07-30-23 @ 16:44) (65 - 104)  BP: 113/68 (07-30-23 @ 16:44) (113/68 - 129/71)  RR: 17 (07-30-23 @ 14:05) (17 - 18)  SpO2: 91% (07-30-23 @ 14:05) (91% - 93%)  Telemetry:   CAPILLARY BLOOD GLUCOSE      POCT Blood Glucose.: 162 mg/dL (30 Jul 2023 17:19)  POCT Blood Glucose.: 157 mg/dL (30 Jul 2023 12:29)  POCT Blood Glucose.: 152 mg/dL (30 Jul 2023 08:31)  POCT Blood Glucose.: 186 mg/dL (29 Jul 2023 21:26)    I&O's Summary    29 Jul 2023 07:01  -  30 Jul 2023 07:00  --------------------------------------------------------  IN: 530 mL / OUT: 2800 mL / NET: -2270 mL    30 Jul 2023 07:01  -  30 Jul 2023 21:11  --------------------------------------------------------  IN: 480 mL / OUT: 1650 mL / NET: -1170 mL        PHYSICAL EXAM:  GENERAL: NAD, well-developed  HEAD:  Atraumatic, Normocephalic  EYES: EOMI, PERRLA, conjunctiva and sclera clear  NECK: Supple, No JVD  CHEST/LUNG: Clear to auscultation bilaterally; No wheeze  HEART: Regular rate and rhythm; No murmurs, rubs, or gallops  ABDOMEN: Soft, Nontender, Nondistended; Bowel sounds present  EXTREMITIES:  2+ Peripheral Pulses, No clubbing, cyanosis, or edema  PSYCH: AAOx3  NEUROLOGY: non-focal  SKIN: No rashes or lesions    LABS:                        13.9   10.07 )-----------( 130      ( 30 Jul 2023 04:18 )             44.0     07-30    136  |  90<L>  |  22  ----------------------------<  161<H>  3.8   |  37<H>  |  0.46<L>    Ca    10.4      30 Jul 2023 04:18  Phos  1.5     07-30  Mg     2.0     07-30    TPro  7.7  /  Alb  4.3  /  TBili  0.6  /  DBili  x   /  AST  11  /  ALT  8<L>  /  AlkPhos  96  07-30          Urinalysis Basic - ( 30 Jul 2023 04:18 )    Color: x / Appearance: x / SG: x / pH: x  Gluc: 161 mg/dL / Ketone: x  / Bili: x / Urobili: x   Blood: x / Protein: x / Nitrite: x   Leuk Esterase: x / RBC: x / WBC x   Sq Epi: x / Non Sq Epi: x / Bacteria: x        RADIOLOGY & ADDITIONAL TESTS:    Imaging Personally Reviewed:    Consultant(s) Notes Reviewed:      Care Discussed with Consultants/Other Providers:

## 2023-07-30 NOTE — PROGRESS NOTE ADULT - ASSESSMENT
ECHO 7/27/23: EF 63%; nl LV sys fx,  no regional wall motion abnormalities seen. severe (grade 3) left ventricular diastolic dysfunction, with elevated filling pressure.Estimated pulmonary artery systolic pressure is 54 mmHg. The right atrium is severely dilated. The left atrium is severely dilated   ARSH 7/26/23:  No pericardial effusion seen. Moderate tricuspid regurgitation. Estimated pulmonary artery systolic pressure is 51 mmHg, consistent with moderate pulmonary hypertension. Left ventricular endocardium is not well visualized; however, the left ventricular systolic function appears grossly normal.  NM PET/CT Myocardial Sarcoidosis FDG (04.20.23 @ 13:19) >  OTHER STUDIES USED FOR CORRELATION: None  IMPRESSION: FDG PET/CT of the heart shows no evidence of FDG-avid   myocardial inflammation. Diffuse myocardial hypermetabolism seen on prior   study dated 4/6/2023, is not seen on the current study, and most likely   was due to inadequate dietary preparation.    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 Now referred for Afib Ablation.  she is now SP AF ablation and admitted with acute CHF     # Afib s/p afib ablation on 7/26  -EP f/u  -in nsr   -ac on eliquis     #acute on chronic Diastolic CHF   -s/p ablation, post acute CHF   -improving, vol status improved  -HF team f/u  -on lasix drip, responding well  -alkalosis, low cl on bmp, cont to monitor   -Outpt PET/CT in April 2023 w/o evidence of Sarcoid.   -TTE reveal Biatrial enlargement ?amyloid  -cardiac mri/amlyoid w/u per HF  -reported CT cor wnl 12/2022  -consider repeat ischemic eval once vol status optimal   -le doppler neg for dvt     # AVB s/p PPM   -stable, ep f/u    #COPD  -pulm fu     45 minutes spent on total encounter; more than 50% of the visit was spent counseling and/or coordinating care by the attending physician.

## 2023-07-30 NOTE — PROGRESS NOTE ADULT - SUBJECTIVE AND OBJECTIVE BOX
CARDIOLOGY FOLLOW UP NOTE - DR. ELLER    Patient Name: CHETNA MONTEMAYOR  Date of Service: 07-30-23 @ 13:28    Patient seen and examined  feels better  improved dyspnea, orthopnea      Subjective:    cv: denies chest pain, palpitations, dizziness  pulmonary: denies cough  GI: denies abdominal pain, nausea, vomiting  vascular/legs: + edema   skin: no rash  ROS: otherwise negative   overnight events:      PHYSICAL EXAM:  T(C): 36.7 (07-30-23 @ 11:20), Max: 36.8 (07-30-23 @ 03:46)  HR: 104 (07-30-23 @ 11:20) (65 - 105)  BP: 114/69 (07-30-23 @ 11:20) (114/69 - 129/71)  RR: 18 (07-30-23 @ 11:20) (18 - 18)  SpO2: 91% (07-30-23 @ 11:20) (91% - 93%)  Wt(kg): --  I&O's Summary    29 Jul 2023 07:01  -  30 Jul 2023 07:00  --------------------------------------------------------  IN: 530 mL / OUT: 2800 mL / NET: -2270 mL    30 Jul 2023 07:01  -  30 Jul 2023 13:28  --------------------------------------------------------  IN: 480 mL / OUT: 0 mL / NET: 480 mL      Daily     Daily     Appearance: Normal	  Cardiovascular: Normal S1 S2,RRR, elev jvp  Respiratory: Lungs clear to auscultation	  Gastrointestinal:  Soft, Non-tender, + BS	  Extremities: Normal range of motion, b/l edema improved      Home Medications:  Eliquis 5 mg oral tablet: 1 orally 2 times a day (26 Jul 2023 11:39)  Lasix 40 mg oral tablet: 1 orally once a day (26 Jul 2023 11:42)  simvastatin 20 mg oral tablet: 1 orally once a day (26 Jul 2023 11:42)      MEDICATIONS  (STANDING):  albuterol/ipratropium for Nebulization 3 milliLiter(s) Nebulizer every 6 hours  apixaban 5 milliGRAM(s) Oral every 12 hours  atorvastatin 40 milliGRAM(s) Oral at bedtime  azithromycin  IVPB 500 milliGRAM(s) IV Intermittent every 24 hours  budesonide 160 MICROgram(s)/formoterol 4.5 MICROgram(s) Inhaler 2 Puff(s) Inhalation two times a day  cefTRIAXone   IVPB 1000 milliGRAM(s) IV Intermittent every 24 hours  cefTRIAXone   IVPB      chlorhexidine 2% Cloths 1 Application(s) Topical <User Schedule>  colchicine 0.6 milliGRAM(s) Oral daily  furosemide Infusion 10 mG/Hr (5 mL/Hr) IV Continuous <Continuous>  hydrALAZINE 10 milliGRAM(s) Oral three times a day  insulin glargine Injectable (LANTUS) 15 Unit(s) SubCutaneous at bedtime  insulin lispro (ADMELOG) corrective regimen sliding scale   SubCutaneous three times a day before meals  insulin lispro Injectable (ADMELOG) 4 Unit(s) SubCutaneous three times a day before meals  nicotine -   7 mG/24Hr(s) Patch 1 Patch Transdermal daily  pantoprazole    Tablet 40 milliGRAM(s) Oral before breakfast  tiotropium 2.5 MICROgram(s) Inhaler 2 Puff(s) Inhalation daily      TELEMETRY: 	    ECG:  	  RADIOLOGY:   DIAGNOSTIC TESTING:  [ ] Echocardiogram:  [ ] Catheterization:  [ ] Stress Test:    OTHER: 	    LABS:	 	    CARDIAC MARKERS:                                      13.9   10.07 )-----------( 130      ( 30 Jul 2023 04:18 )             44.0     07-30    136  |  90<L>  |  22  ----------------------------<  161<H>  3.8   |  37<H>  |  0.46<L>    Ca    10.4      30 Jul 2023 04:18  Phos  1.5     07-30  Mg     2.0     07-30    TPro  7.7  /  Alb  4.3  /  TBili  0.6  /  DBili  x   /  AST  11  /  ALT  8<L>  /  AlkPhos  96  07-30    proBNP:     Lipid Profile:   HgA1c:     Creatinine: 0.46 mg/dL (07-30-23 @ 04:18)  Creatinine: 0.45 mg/dL (07-29-23 @ 06:44)  Creatinine: 0.52 mg/dL (07-28-23 @ 14:09)  Creatinine: 0.52 mg/dL (07-28-23 @ 06:33)

## 2023-07-30 NOTE — CONSULT NOTE ADULT - ATTENDING COMMENTS
64y/o  female with known implantable device PPM [facility] 2018 Covid 19 negative unvaccinated with PMHX of PPM Afib on Eliquis last dose on Monday 7/24/23 AM dose , HTN, Tobacco smoker current 0.5 ppd , CHF and AVB. PT presented for Afib ablation and now with worsening hypoxia, on Lasix GTT, on steroids for COPD exacerbation, concerns for pneumonia and cellulitis.    Suspected Pneumonia  Afebrile, no leucocytosis, minimal non productive cough  procalcitonin normal   Infiltrates on CT; likely atelectasis  recommend stopping antibiotics, monitor off.       LE Cellulitis: given chronicity of symptoms  skin findings likely represent venous stasis dematitis rather than cellulitis   skin changes resolved   no further antibiotics warranted  leg elevation. compression stockings.     Will sign off, please call with questions.     Júnior Helm  Please contact through MS Teams   If no response or past 5 pm/weekend call [phone].
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 Referred for Afib ablation with Dr. Roca. S/p procedure (7/26) w/o known complications. Post procedure noted increased oxygen requirements (4-5L NC, sating ~88-90%). Pulm consulted for noted desat. Patient follows [facility], where she had dx of COPD. States she currently uses albuterol, but had used Spiriva in past. On 2L O2 via NC. TTE: EF 63%, grade 3 diastolic dysfunction, LA/RV/RA dilation, PASP 54. Suspect acute on chronic HF leading to volume overload and hypoxia.  Suspect decompensated HF by virtue of CXR and PE finding s c/w volume overload    Acute on chronic hypo  resp failure  Recommend:  - continue aggressive diuresis 80mg BID  > c/w diuresis: lasix 80 IV BID  > goal SpO2 b/w >92%  > d/c steroids, unlikely to be COPD exacerbation  > optimize COPD regimen: albuterol q6 hrs PRN + scheduled Spiriva & Symbicort     Shyann Roland MD

## 2023-07-30 NOTE — PROGRESS NOTE ADULT - ASSESSMENT
64 yo woman admitted for Afib ablation, post procedure developed diffuse rales and fluid overload.   ptn transferred to my service 2/2 acute hypoxic respiratory failure, systolic  R heart failure and diastolic L heart failure.. awaiting HF consult, s/p Afib ablation, now in sinus, on ELiquis. ptn is a chronic smoker, h/o PPM 2/2 AVB. ptn also c/o pleuritic CP " ever since i woke up from anesthesia" ptn states she is on home O2,, 2LNC. She is not on any COPD meds/inhalers, has a pulmonologist, smokes 1 PPD x50 yrs.  ptn is noted to have LE erythema, she has tend over LE    A/P  TTE c/w biventricular HF,   CTA chest: no PE, has bilateral extensive infiltrates. seen by ID, suggested to stop ABx, pulm abn on chest CT presumed to be atelectasis. will stop and observe off ABx  LE dopplers neg for DVT  ongoing volume overload, better urine output on lasix drip, feels less dyspneic,   she is lethargic and dyspneic though overall is a little better.   ptn states her breathing is short post ablation. on colchicine post ablation, pleuritic CP resolved,   cont Lasix drip  RHC/LHC when more euvolemic.   hyperglycemic 2/2 Steroids, now on prednisone taper   ptn also has varicose veins could contribute to erythema of LE 2/2 superficial thrombophlebitis.     cont  symbicort and spiriva, chloé  ordered PYP scan to R/O cardiac amyloid when able to lie flat  - cont statin, Eliquis    GI ppx w PPI

## 2023-07-30 NOTE — CONSULT NOTE ADULT - ASSESSMENT
This is a 64y/o  female with known implantable device PPM [facility] 2018 Covid 19 negative unvaccinated with PMHX of PPM Afib on Eliquis last dose on Monday 7/24/23 AM dose , HTN, Tobacco smoker current 0.5 ppd , CHF and AVB. PT presented for Afib ablation and now with worsening hypoxia, on Lasix GTT, on steroids for COPD exacerbation, concerns for pneumonia and cellulitis.    Suspected Pneumonia  Increased infiltrates in CT  Afebrile, no leucocytosis, minimal non productive cough  On ceftriaxone and Azithromycin    Cellulitis  Improved on Ancef      Overall;    Suggest;    All recommendations are tentative pending Attending Attestation.    Miguel Angel Johnson MD, PGY-4  ID Fellow  [employer] Teams Preferred  After 5pm/weekends call [phone]   This is a 64y/o  female with known implantable device PPM [facility] 2018 Covid 19 negative unvaccinated with PMHX of PPM Afib on Eliquis last dose on Monday 7/24/23 AM dose , HTN, Tobacco smoker current 0.5 ppd , CHF and AVB. PT presented for Afib ablation and now with worsening hypoxia, on Lasix GTT, on steroids for COPD exacerbation, concerns for pneumonia and cellulitis.    Suspected Pneumonia  Afebrile, no leucocytosis, minimal non productive cough  Infiltrates on CT; likely atelectasis  Ok to stop all antibiotics      LE Cellulitis  Improved on Ancef; she has recent course of oral antibiotics prior to admission; no further antibiotics warranted    Discussed with attending and Primary service    Miguel Angel Johnson MD, PGY-4  ID Fellow  [employer] Teams Preferred  After 5pm/weekends call [phone]

## 2023-07-30 NOTE — CONSULT NOTE ADULT - SUBJECTIVE AND OBJECTIVE BOX
Patient is a 63y old  Female who presents with a chief complaint of shortness of breath (28 Jul 2023 12:33)    HPI:  This is a 64y/o  female with known implantable device PPM [facility] 2018 Covid 19 negative unvaccinated with PMHX of PPM Afib on Eliquis last dose on Monday 7/24/23 AM dose , HTN, Tobacco smoker current 0.5 ppd , CHF and AVB. PT presents with recent increasing fatigue sob with exertion and decrease exercise tolerance. Now referred for Afib Ablation with Dr. Roca. Underwent  ablation on 7/26 and had worsening hypoxia overnight. Also had pleuritic chest pain post procedure. And was noted to have RLE erythema and swelling and was started on Ancef. Was noted to have bibasilar infiltrates on CTA yesterday. Ancef was switched to ceftriaxone and Azithromycin was added on 7/29. ID consulted for antibiotic management.      prior hospital charts reviewed [  ]  primary team notes reviewed [x  ]  other consultant notes reviewed [ x ]    PAST MEDICAL & SURGICAL HISTORY:  Chronic atrial fibrillation      HTN (hypertension)      Acute on chronic systolic congestive heart failure      Former light tobacco smoker      Pacemaker      History of abdominal hernia          Allergies  No Known Allergies    ANTIMICROBIALS (past 90 days)  MEDICATIONS  (STANDING):  azithromycin  IVPB   255 mL/Hr IV Intermittent (07-30-23 @ 05:42)    ceFAZolin   IVPB   100 mL/Hr IV Intermittent (07-27-23 @ 18:17)    ceFAZolin   IVPB   100 mL/Hr IV Intermittent (07-29-23 @ 21:51)   100 mL/Hr IV Intermittent (07-29-23 @ 13:06)   100 mL/Hr IV Intermittent (07-29-23 @ 06:07)   100 mL/Hr IV Intermittent (07-28-23 @ 22:00)   100 mL/Hr IV Intermittent (07-28-23 @ 13:07)   100 mL/Hr IV Intermittent (07-28-23 @ 05:04)   100 mL/Hr IV Intermittent (07-27-23 @ 21:06)    cefTRIAXone   IVPB   1000 milliGRAM(s) IV Intermittent (07-30-23 @ 00:27)        azithromycin  IVPB 500 every 24 hours  cefTRIAXone   IVPB 1000 every 24 hours  cefTRIAXone   IVPB      MEDICATIONS  (STANDING):  acetaminophen     Tablet .. 650 every 6 hours PRN  albuterol/ipratropium for Nebulization 3 every 6 hours  apixaban 5 every 12 hours  atorvastatin 40 at bedtime  budesonide 160 MICROgram(s)/formoterol 4.5 MICROgram(s) Inhaler 2 two times a day  colchicine 0.6 daily  furosemide Infusion 10 <Continuous>  hydrALAZINE 10 three times a day  insulin glargine Injectable (LANTUS) 15 at bedtime  insulin lispro (ADMELOG) corrective regimen sliding scale  three times a day before meals  insulin lispro Injectable (ADMELOG) 4 three times a day before meals  ipratropium  for Nebulization. 500 once PRN  oxycodone    5 mG/acetaminophen 325 mG 1 every 6 hours PRN  pantoprazole    Tablet 40 before breakfast  tiotropium 2.5 MICROgram(s) Inhaler 2 daily    SOCIAL HISTORY:  Active smoker, no drug alcohol use    FAMILY HISTORY:  FH: myocardial infarction (Mother)    FH: CHF (congestive heart failure) (Father)      REVIEW OF SYSTEMS  [  ] ROS unobtainable because:    [ x ] All other systems negative except as noted below:	    Constitutional:  [ ] fever [ ] chills  [ ] weight loss  [ ] weakness  Skin:  [ ] rash [ ] phlebitis	  Eyes: [ ] icterus [ ] pain  [ ] discharge	  ENMT: [ ] sore throat  [ ] thrush [ ] ulcers [ ] exudates  Respiratory: [ x] dyspnea [ ] hemoptysis [x ] cough [ ] sputum	  Cardiovascular:  [ ] chest pain [ ] palpitations [ ] edema	  Gastrointestinal:  [ ] nausea [ ] vomiting [ ] diarrhea [ ] constipation [ ] pain	  Genitourinary:  [ ] dysuria [ ] frequency [ ] hematuria [ ] discharge [ ] flank pain  [ ] incontinence  Musculoskeletal:  [ ] myalgias [ ] arthralgias [ ] arthritis  [ ] back pain  Neurological:  [ ] headache [ ] seizures  [ ] confusion/altered mental status  Psychiatric:  [ ] anxiety [ ] depression	  Hematology/Lymphatics:  [ ] lymphadenopathy  Endocrine:  [ ] adrenal [ ] thyroid  Allergic/Immunologic:	 [ ] transplant [ ] seasonal    Vital Signs Last 24 Hrs  T(F): 98.3 (07-30-23 @ 03:46), Max: 98.9 (07-29-23 @ 04:27)  Vital Signs Last 24 Hrs  HR: 65 (07-30-23 @ 03:46) (65 - 105)  BP: 129/71 (07-30-23 @ 03:46) (116/67 - 129/71)  RR: 18 (07-30-23 @ 03:46)  SpO2: 93% (07-30-23 @ 03:46) (93% - 96%)  Wt(kg): --    PHYSICAL EXAM:  Constitutional: non-toxic, no distress  HEAD/EYES: anicteric, no conjunctival injection  ENT:  supple, no thrush  Cardiovascular:   normal S1, S2, no murmur, no edema  Respiratory:  bilateral rales+  GI:  soft, non-tender, normal bowel sounds  :  no keane, no CVA tenderness  Musculoskeletal:  no synovitis, normal ROM  Neurologic: awake and alert, normal strength, no focal findings  Skin:  no rash, no erythema, no phlebitis  Heme/Onc: no lymphadenopathy   Psychiatric:  awake, alert, appropriate mood                            13.9   10.07 )-----------( 130      ( 30 Jul 2023 04:18 )             44.0   07-30    136  |  90<L>  |  22  ----------------------------<  161<H>  3.8   |  37<H>  |  0.46<L>    Ca    10.4      30 Jul 2023 04:18  Phos  1.5     07-30  Mg     2.0     07-30    TPro  7.7  /  Alb  4.3  /  TBili  0.6  /  DBili  x   /  AST  11  /  ALT  8<L>  /  AlkPhos  96  07-30    Urinalysis Basic - ( 30 Jul 2023 04:18 )    Color: x / Appearance: x / SG: x / pH: x  Gluc: 161 mg/dL / Ketone: x  / Bili: x / Urobili: x   Blood: x / Protein: x / Nitrite: x   Leuk Esterase: x / RBC: x / WBC x   Sq Epi: x / Non Sq Epi: x / Bacteria: x    MICROBIOLOGY:    MRSA PCR Negative  Procal 0.09    RADIOLOGY:  imaging below personally reviewed and agree with findings    < from: CT Angio Chest PE Protocol w/ IV Cont (07.28.23 @ 16:56) >    PROCEDURE DATE:  07/28/2023          INTERPRETATION:  CLINICAL INFORMATION: Evaluate for pulmonary embolism.    COMPARISON: None.    CONTRAST/COMPLICATIONS:  IVContrast: Omnipaque 350  80 cc administered   20 cc discarded  Oral Contrast: NONE  Complications: None reported at time of study completion    PROCEDURE:  CT Angiography of the Chest.  Sagittal and coronal reformats were performed as well as 3D (MIP)   reconstructions.    FINDINGS:  Left subclavian AV sequential pacemaker in place.  AIRWAYS: Patent central airways.  LUNGS AND PLEURA: Small bilateral pleural effusions. There is associated   moderately extensive bibasilar infiltrate and/or subsegmental atelectasis.  Subsegmental atelectasis within the posterior aspect of the left upper   lobe.  MEDIASTINUM AND WILLA: No lymphadenopathy.  VESSELS: Atherosclerotic vascular calcification thoracic aorta. No   aneurysm or dissection. No acute aorticsyndrome. No evidence of   pulmonary embolism where visualized. No central PE. No saddle embolus.   Lower lobe vessels less well seen.  HEART: Moderate cardiomegaly.. Trace pericardial effusion.  CHEST WALL AND LOWER NECK: Within normal limits.  VISUALIZED UPPER ABDOMEN: Within normal limits.  BONES: Multiple blastic process.    IMPRESSION:  Small bilateral pleural effusions. There is associated moderately   extensive bibasilar infiltrate and/or subsegmental atelectasis.    Subsegmental atelectasis within the posterior aspect of the left upper   lobe.    No evidence of pulmonary embolism where visualized. Lower lobe vessels   are less well seen.    Please refer to detailed findings otherwise described above.    < end of copied text >   Patient is a 63y old  Female who presents with a chief complaint of shortness of breath (28 Jul 2023 12:33)    HPI:  This is a 64y/o  female with known implantable device PPM [facility] 2018 Covid 19 negative unvaccinated with PMHX of PPM Afib on Eliquis last dose on Monday 7/24/23 AM dose , HTN, Tobacco smoker current 0.5 ppd , CHF and AVB. PT presents with recent increasing fatigue sob with exertion and decrease exercise tolerance. Now referred for Afib Ablation with Dr. Roca. Underwent  ablation on 7/26 and had worsening hypoxia overnight. Also had pleuritic chest pain post procedure. And was noted to have RLE erythema and swelling and was started on Ancef. Was noted to have bibasilar infiltrates on CTA yesterday. Ancef was switched to ceftriaxone and Azithromycin was added on 7/29. ID consulted for antibiotic management.      prior hospital charts reviewed [  ]  primary team notes reviewed [x  ]  other consultant notes reviewed [ x ]    PAST MEDICAL & SURGICAL HISTORY:  Chronic atrial fibrillation      HTN (hypertension)      Acute on chronic systolic congestive heart failure      Former light tobacco smoker      Pacemaker      History of abdominal hernia          Allergies  No Known Allergies    ANTIMICROBIALS (past 90 days)  MEDICATIONS  (STANDING):  azithromycin  IVPB   255 mL/Hr IV Intermittent (07-30-23 @ 05:42)    ceFAZolin   IVPB   100 mL/Hr IV Intermittent (07-27-23 @ 18:17)      cefTRIAXone   IVPB   1000 milliGRAM(s) IV Intermittent (07-30-23 @ 00:27)        azithromycin  IVPB 500 every 24 hours  cefTRIAXone   IVPB 1000 every 24 hours  cefTRIAXone   IVPB      MEDICATIONS  (STANDING):  acetaminophen     Tablet .. 650 every 6 hours PRN  albuterol/ipratropium for Nebulization 3 every 6 hours  apixaban 5 every 12 hours  atorvastatin 40 at bedtime  budesonide 160 MICROgram(s)/formoterol 4.5 MICROgram(s) Inhaler 2 two times a day  colchicine 0.6 daily  furosemide Infusion 10 <Continuous>  hydrALAZINE 10 three times a day  insulin glargine Injectable (LANTUS) 15 at bedtime  insulin lispro (ADMELOG) corrective regimen sliding scale  three times a day before meals  insulin lispro Injectable (ADMELOG) 4 three times a day before meals  ipratropium  for Nebulization. 500 once PRN  oxycodone    5 mG/acetaminophen 325 mG 1 every 6 hours PRN  pantoprazole    Tablet 40 before breakfast  tiotropium 2.5 MICROgram(s) Inhaler 2 daily        SOCIAL HISTORY:  Active smoker, no drug alcohol use        FAMILY HISTORY:  FH: myocardial infarction (Mother)    FH: CHF (congestive heart failure) (Father)        REVIEW OF SYSTEMS  [  ] ROS unobtainable because:    [ x ] All other systems negative except as noted below:	    Constitutional:  [ ] fever [ ] chills  Skin:  [ ] rash [ ] phlebitis	  Eyes: [ ] icterus [ ] pain  [ ] discharge	  ENMT: [ ] sore throat  [ ] thrush  Respiratory: [ x] dyspnea [x ] cough [ ] sputum	  Cardiovascular:  [ ] chest pain   Gastrointestinal:  [ ] nausea [ ] vomiting [ ] diarrhea [ ] constipation [ ] pain	  Genitourinary:  [ ] dysuria [ ] frequency   Musculoskeletal:  [ ] myalgias [ ] back pain  Neurological:  [ ] headache  [ ] confusion/altered mental status  Psychiatric:  [ ] anxiety [ ] depression	  Extremities: per HPI.   Endocrine:  [ ] adrenal [ ] thyroid  Allergic/Immunologic:	 [ ] transplant [ ] seasonal      Vital Signs Last 24 Hrs  T(F): 98.3 (07-30-23 @ 03:46), Max: 98.9 (07-29-23 @ 04:27)  Vital Signs Last 24 Hrs  HR: 65 (07-30-23 @ 03:46) (65 - 105)  BP: 129/71 (07-30-23 @ 03:46) (116/67 - 129/71)  RR: 18 (07-30-23 @ 03:46)  SpO2: 93% (07-30-23 @ 03:46) (93% - 96%)  Wt(kg): --      PHYSICAL EXAM:  Constitutional: non-toxic, no distress  HEAD/EYES: anicteric, no conjunctival injection  ENT:  no thrush  Neck: Supple   Cardiovascular: normal S1, S2,   Respiratory:  bilateral rales+  GI:  soft, non-tender,  :  no keane,   Musculoskeletal:  no synovitis,  Neurologic: awake and alert, no focal findings  Skin:  no rash,   Extremities: Some edema, no erythema   Psychiatric:  awake, alert, appropriate mood                              13.9   10.07 )-----------( 130      ( 30 Jul 2023 04:18 )             44.0   07-30    136  |  90<L>  |  22  ----------------------------<  161<H>  3.8   |  37<H>  |  0.46<L>    Ca    10.4      30 Jul 2023 04:18  Phos  1.5     07-30  Mg     2.0     07-30    TPro  7.7  /  Alb  4.3  /  TBili  0.6  /  DBili  x   /  AST  11  /  ALT  8<L>  /  AlkPhos  96  07-30    Urinalysis Basic - ( 30 Jul 2023 04:18 )    Color: x / Appearance: x / SG: x / pH: x  Gluc: 161 mg/dL / Ketone: x  / Bili: x / Urobili: x   Blood: x / Protein: x / Nitrite: x   Leuk Esterase: x / RBC: x / WBC x   Sq Epi: x / Non Sq Epi: x / Bacteria: x    MICROBIOLOGY:    MRSA PCR Negative  Procal 0.09      RADIOLOGY:  imaging below personally reviewed and agree with findings      < from: CT Angio Chest PE Protocol w/ IV Cont (07.28.23 @ 16:56) >      IMPRESSION:  Small bilateral pleural effusions. There is associated moderately   extensive bibasilar infiltrate and/or subsegmental atelectasis.    Subsegmental atelectasis within the posterior aspect of the left upper   lobe.    No evidence of pulmonary embolism where visualized. Lower lobe vessels   are less well seen.    Please refer to detailed findings otherwise described above.

## 2023-07-30 NOTE — CONSULT NOTE ADULT - CONSULT REASON
CHF exacerbation
SOB on Exertion
CHF
Pneumonia/Cellulitis
steroid induced hyperglycemias
back pain, started yesterday getting progressively worse

## 2023-07-30 NOTE — PROGRESS NOTE ADULT - ASSESSMENT
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 Referred for Afib ablation with Dr. Roca.   Assessment  Hyperglycemias: Steroid induced, a1c 5.6%, s/p IV Methylprednisolone, eating meals, started on insulin.   Had steroid induced hyperglycemias, glucose improving  Smoker: shortness of breath, on O2, started on steroids.   HTN/CHF: on antihypertensive medications, monitored, asymptomatic.      Discussed plan and management with Attending   Roger Woods NP - TEAMS  Dr Sebas Childers  [phone]

## 2023-07-31 DIAGNOSIS — I50.33 ACUTE ON CHRONIC DIASTOLIC (CONGESTIVE) HEART FAILURE: ICD-10-CM

## 2023-07-31 LAB
ANION GAP SERPL CALC-SCNC: 13 MMOL/L — SIGNIFICANT CHANGE UP (ref 5–17)
ANION GAP SERPL CALC-SCNC: 8 MMOL/L — SIGNIFICANT CHANGE UP (ref 5–17)
ANION GAP SERPL CALC-SCNC: 9 MMOL/L — SIGNIFICANT CHANGE UP (ref 5–17)
BUN SERPL-MCNC: 20 MG/DL — SIGNIFICANT CHANGE UP (ref 7–23)
CALCIUM SERPL-MCNC: 10.1 MG/DL — SIGNIFICANT CHANGE UP (ref 8.4–10.5)
CALCIUM SERPL-MCNC: 10.4 MG/DL — SIGNIFICANT CHANGE UP (ref 8.4–10.5)
CALCIUM SERPL-MCNC: 9.9 MG/DL — SIGNIFICANT CHANGE UP (ref 8.4–10.5)
CHLORIDE SERPL-SCNC: 85 MMOL/L — LOW (ref 96–108)
CHLORIDE SERPL-SCNC: 86 MMOL/L — LOW (ref 96–108)
CHLORIDE SERPL-SCNC: 89 MMOL/L — LOW (ref 96–108)
CO2 SERPL-SCNC: 40 MMOL/L — HIGH (ref 22–31)
CO2 SERPL-SCNC: 41 MMOL/L — HIGH (ref 22–31)
CO2 SERPL-SCNC: 42 MMOL/L — HIGH (ref 22–31)
CREAT SERPL-MCNC: 0.54 MG/DL — SIGNIFICANT CHANGE UP (ref 0.5–1.3)
CREAT SERPL-MCNC: 0.59 MG/DL — SIGNIFICANT CHANGE UP (ref 0.5–1.3)
CREAT SERPL-MCNC: 0.67 MG/DL — SIGNIFICANT CHANGE UP (ref 0.5–1.3)
EGFR: 102 ML/MIN/1.73M2 — SIGNIFICANT CHANGE UP
EGFR: 104 ML/MIN/1.73M2 — SIGNIFICANT CHANGE UP
EGFR: 99 ML/MIN/1.73M2 — SIGNIFICANT CHANGE UP
GLUCOSE BLDC GLUCOMTR-MCNC: 102 MG/DL — HIGH (ref 70–99)
GLUCOSE BLDC GLUCOMTR-MCNC: 104 MG/DL — HIGH (ref 70–99)
GLUCOSE BLDC GLUCOMTR-MCNC: 111 MG/DL — HIGH (ref 70–99)
GLUCOSE BLDC GLUCOMTR-MCNC: 128 MG/DL — HIGH (ref 70–99)
GLUCOSE SERPL-MCNC: 106 MG/DL — HIGH (ref 70–99)
GLUCOSE SERPL-MCNC: 106 MG/DL — HIGH (ref 70–99)
GLUCOSE SERPL-MCNC: 149 MG/DL — HIGH (ref 70–99)
INTERPRETATION 24H UR IFE-IMP: SIGNIFICANT CHANGE UP
INTERPRETATION 24H UR IFE-IMP: SIGNIFICANT CHANGE UP
LEGIONELLA AG UR QL: NEGATIVE — SIGNIFICANT CHANGE UP
MAGNESIUM SERPL-MCNC: 1.7 MG/DL — SIGNIFICANT CHANGE UP (ref 1.6–2.6)
MAGNESIUM SERPL-MCNC: 1.8 MG/DL — SIGNIFICANT CHANGE UP (ref 1.6–2.6)
MAGNESIUM SERPL-MCNC: 2 MG/DL — SIGNIFICANT CHANGE UP (ref 1.6–2.6)
PHOSPHATE SERPL-MCNC: 1.8 MG/DL — LOW (ref 2.5–4.5)
PHOSPHATE SERPL-MCNC: 1.9 MG/DL — LOW (ref 2.5–4.5)
POTASSIUM SERPL-MCNC: 3.5 MMOL/L — SIGNIFICANT CHANGE UP (ref 3.5–5.3)
POTASSIUM SERPL-MCNC: 3.7 MMOL/L — SIGNIFICANT CHANGE UP (ref 3.5–5.3)
POTASSIUM SERPL-MCNC: 4.1 MMOL/L — SIGNIFICANT CHANGE UP (ref 3.5–5.3)
POTASSIUM SERPL-SCNC: 3.5 MMOL/L — SIGNIFICANT CHANGE UP (ref 3.5–5.3)
POTASSIUM SERPL-SCNC: 3.7 MMOL/L — SIGNIFICANT CHANGE UP (ref 3.5–5.3)
POTASSIUM SERPL-SCNC: 4.1 MMOL/L — SIGNIFICANT CHANGE UP (ref 3.5–5.3)
SODIUM SERPL-SCNC: 137 MMOL/L — SIGNIFICANT CHANGE UP (ref 135–145)
SODIUM SERPL-SCNC: 137 MMOL/L — SIGNIFICANT CHANGE UP (ref 135–145)
SODIUM SERPL-SCNC: 139 MMOL/L — SIGNIFICANT CHANGE UP (ref 135–145)

## 2023-07-31 PROCEDURE — 78830 RP LOCLZJ TUM SPECT W/CT 1: CPT | Mod: 26

## 2023-07-31 PROCEDURE — 93010 ELECTROCARDIOGRAM REPORT: CPT

## 2023-07-31 PROCEDURE — 99232 SBSQ HOSP IP/OBS MODERATE 35: CPT | Mod: GC

## 2023-07-31 PROCEDURE — 99233 SBSQ HOSP IP/OBS HIGH 50: CPT

## 2023-07-31 RX ORDER — MAGNESIUM SULFATE 500 MG/ML
1 VIAL (ML) INJECTION ONCE
Refills: 0 | Status: COMPLETED | OUTPATIENT
Start: 2023-07-31 | End: 2023-07-31

## 2023-07-31 RX ORDER — SODIUM,POTASSIUM PHOSPHATES 278-250MG
1 POWDER IN PACKET (EA) ORAL ONCE
Refills: 0 | Status: COMPLETED | OUTPATIENT
Start: 2023-07-31 | End: 2023-07-31

## 2023-07-31 RX ORDER — INSULIN GLARGINE 100 [IU]/ML
12 INJECTION, SOLUTION SUBCUTANEOUS AT BEDTIME
Refills: 0 | Status: DISCONTINUED | OUTPATIENT
Start: 2023-07-31 | End: 2023-08-01

## 2023-07-31 RX ORDER — POTASSIUM CHLORIDE 20 MEQ
40 PACKET (EA) ORAL EVERY 4 HOURS
Refills: 0 | Status: COMPLETED | OUTPATIENT
Start: 2023-07-31 | End: 2023-07-31

## 2023-07-31 RX ORDER — SPIRONOLACTONE 25 MG/1
25 TABLET, FILM COATED ORAL DAILY
Refills: 0 | Status: DISCONTINUED | OUTPATIENT
Start: 2023-07-31 | End: 2023-08-03

## 2023-07-31 RX ORDER — POTASSIUM PHOSPHATE, MONOBASIC POTASSIUM PHOSPHATE, DIBASIC 236; 224 MG/ML; MG/ML
15 INJECTION, SOLUTION INTRAVENOUS ONCE
Refills: 0 | Status: COMPLETED | OUTPATIENT
Start: 2023-07-31 | End: 2023-07-31

## 2023-07-31 RX ORDER — FUROSEMIDE 40 MG
7.5 TABLET ORAL
Qty: 500 | Refills: 0 | Status: DISCONTINUED | OUTPATIENT
Start: 2023-07-31 | End: 2023-08-02

## 2023-07-31 RX ADMIN — TIOTROPIUM BROMIDE 2 PUFF(S): 18 CAPSULE ORAL; RESPIRATORY (INHALATION) at 12:33

## 2023-07-31 RX ADMIN — BUDESONIDE AND FORMOTEROL FUMARATE DIHYDRATE 2 PUFF(S): 160; 4.5 AEROSOL RESPIRATORY (INHALATION) at 18:44

## 2023-07-31 RX ADMIN — APIXABAN 5 MILLIGRAM(S): 2.5 TABLET, FILM COATED ORAL at 20:33

## 2023-07-31 RX ADMIN — Medication 40 MILLIEQUIVALENT(S): at 14:32

## 2023-07-31 RX ADMIN — APIXABAN 5 MILLIGRAM(S): 2.5 TABLET, FILM COATED ORAL at 08:15

## 2023-07-31 RX ADMIN — Medication 1 PACKET(S): at 22:50

## 2023-07-31 RX ADMIN — Medication 10 MILLIGRAM(S): at 21:30

## 2023-07-31 RX ADMIN — Medication 4 UNIT(S): at 12:34

## 2023-07-31 RX ADMIN — Medication 3 MILLILITER(S): at 05:29

## 2023-07-31 RX ADMIN — SPIRONOLACTONE 25 MILLIGRAM(S): 25 TABLET, FILM COATED ORAL at 14:32

## 2023-07-31 RX ADMIN — Medication 4 UNIT(S): at 18:43

## 2023-07-31 RX ADMIN — Medication 3 MILLILITER(S): at 18:44

## 2023-07-31 RX ADMIN — Medication 40 MILLIEQUIVALENT(S): at 18:46

## 2023-07-31 RX ADMIN — PANTOPRAZOLE SODIUM 40 MILLIGRAM(S): 20 TABLET, DELAYED RELEASE ORAL at 05:30

## 2023-07-31 RX ADMIN — CHLORHEXIDINE GLUCONATE 1 APPLICATION(S): 213 SOLUTION TOPICAL at 05:30

## 2023-07-31 RX ADMIN — Medication 10 MILLIGRAM(S): at 05:29

## 2023-07-31 RX ADMIN — Medication 1 PATCH: at 08:14

## 2023-07-31 RX ADMIN — Medication 1 PATCH: at 18:47

## 2023-07-31 RX ADMIN — Medication 4 UNIT(S): at 08:13

## 2023-07-31 RX ADMIN — ATORVASTATIN CALCIUM 40 MILLIGRAM(S): 80 TABLET, FILM COATED ORAL at 21:30

## 2023-07-31 RX ADMIN — Medication 3 MILLILITER(S): at 12:33

## 2023-07-31 RX ADMIN — Medication 10 MILLIGRAM(S): at 14:33

## 2023-07-31 RX ADMIN — Medication 3 MILLILITER(S): at 00:35

## 2023-07-31 RX ADMIN — CEFTRIAXONE 100 MILLIGRAM(S): 500 INJECTION, POWDER, FOR SOLUTION INTRAMUSCULAR; INTRAVENOUS at 00:35

## 2023-07-31 RX ADMIN — Medication 100 GRAM(S): at 14:32

## 2023-07-31 RX ADMIN — INSULIN GLARGINE 12 UNIT(S): 100 INJECTION, SOLUTION SUBCUTANEOUS at 21:30

## 2023-07-31 RX ADMIN — Medication 0.6 MILLIGRAM(S): at 12:33

## 2023-07-31 RX ADMIN — BUDESONIDE AND FORMOTEROL FUMARATE DIHYDRATE 2 PUFF(S): 160; 4.5 AEROSOL RESPIRATORY (INHALATION) at 05:29

## 2023-07-31 RX ADMIN — Medication 1 PATCH: at 12:34

## 2023-07-31 RX ADMIN — Medication 1 PATCH: at 14:28

## 2023-07-31 RX ADMIN — Medication 3.75 MG/HR: at 15:04

## 2023-07-31 RX ADMIN — Medication 30 MILLIGRAM(S): at 05:29

## 2023-07-31 NOTE — CHART NOTE - NSCHARTNOTEFT_GEN_A_CORE
Notified by RN, pt w/ PAF x 5.3 sec on tele, HR up to 170  Pt asymptomatic during time of event, denies CP, palpitations, SOB, n/v, abdominal pain, HA, dizziness, numbness or weakness in extremities  Of note, pt w/ hx of afib s/p ablation now with hospital course complicated by HF  Vital Signs Last 24 Hrs  T(C): 36.7 (31 Jul 2023 19:43), Max: 36.7 (31 Jul 2023 04:24)  T(F): 98.1 (31 Jul 2023 19:43), Max: 98.1 (31 Jul 2023 04:24)  HR: 105 (31 Jul 2023 19:43) (84 - 105)  BP: 129/71 (31 Jul 2023 19:43) (115/73 - 134/79)  BP(mean): --  RR: 18 (31 Jul 2023 19:43) (17 - 18)  SpO2: 91% (31 Jul 2023 19:43) (90% - 93%)    Parameters below as of 31 Jul 2023 19:43  Patient On (Oxygen Delivery Method): nasal cannula  O2 Flow (L/min): 4    >EKG STAT w/ afib with ventricular rate 99 bpm  >HR on tele ranging from 80s-105  >Pt already on AC Notified by RN, pt w/ PAF x 5.3 sec on tele, HR up to 170  Pt asymptomatic during time of event, denies CP, palpitations, SOB, n/v, abdominal pain, HA, dizziness, numbness or weakness in extremities  Of note, pt w/ hx of afib s/p ablation now with hospital course complicated by HF  Vital Signs Last 24 Hrs  T(C): 36.7 (31 Jul 2023 19:43), Max: 36.7 (31 Jul 2023 04:24)  T(F): 98.1 (31 Jul 2023 19:43), Max: 98.1 (31 Jul 2023 04:24)  HR: 105 (31 Jul 2023 19:43) (84 - 105)  BP: 129/71 (31 Jul 2023 19:43) (115/73 - 134/79)  BP(mean): --  RR: 18 (31 Jul 2023 19:43) (17 - 18)  SpO2: 91% (31 Jul 2023 19:43) (90% - 93%)    Parameters below as of 31 Jul 2023 19:43  Patient On (Oxygen Delivery Method): nasal cannula  O2 Flow (L/min): 4    >EKG STAT w/ afib with ventricular rate 99 bpm  >HR on tele ranging from   >Continue to closely assess and monitor overnight  >Will endorse to day team to f/u cards/EP reccs    -Kluadia Marks PA-C  [id number] Notified by RN, pt w/ PAF x 5.3 sec on tele, HR up to 170  Pt asymptomatic during time of event, denies CP, palpitations, SOB, n/v, abdominal pain, HA, dizziness, numbness or weakness in extremities  Of note, pt w/ hx of afib s/p ablation now with hospital course complicated by HF  Vital Signs Last 24 Hrs  T(C): 36.7 (31 Jul 2023 19:43), Max: 36.7 (31 Jul 2023 04:24)  T(F): 98.1 (31 Jul 2023 19:43), Max: 98.1 (31 Jul 2023 04:24)  HR: 105 (31 Jul 2023 19:43) (84 - 105)  BP: 129/71 (31 Jul 2023 19:43) (115/73 - 134/79)  BP(mean): --  RR: 18 (31 Jul 2023 19:43) (17 - 18)  SpO2: 91% (31 Jul 2023 19:43) (90% - 93%)    Parameters below as of 31 Jul 2023 19:43  Patient On (Oxygen Delivery Method): nasal cannula  O2 Flow (L/min): 4    >EKG STAT w/ afib with ventricular rate 99 bpm  >HR on tele ranging from   >BMP, Mag, Phos STAT  >Continue to closely assess and monitor overnight  >Will endorse to day team to f/u cards/EP reccs    -Klaudia Marks PA-C  [id number]

## 2023-07-31 NOTE — PROGRESS NOTE ADULT - ASSESSMENT
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 Referred for Afib ablation with Dr. Roca.   Assessment  Hyperglycemias: Steroid induced, a1c 5.6%, s/p IV Methylprednisolone, eating meals, started on basal bolus insulin.   Had steroid induced hyperglycemias, glucose improving  Smoker: shortness of breath, on O2, started on steroids.   HTN/CHF: on antihypertensive medications, monitored, asymptomatic.      Discussed plan and management with Attending   Roger Woods NP - TEAMS  Dr Sebas Childers  [phone]

## 2023-07-31 NOTE — PROGRESS NOTE ADULT - ASSESSMENT
62 yo woman admitted for Afib ablation, post procedure developed diffuse rales and fluid overload.   ptn transferred to my service 2/2 acute hypoxic respiratory failure, systolic  R heart failure and diastolic L heart failure.. awaiting HF consult, s/p Afib ablation, now in sinus, on ELiquis. ptn is a chronic smoker, h/o PPM 2/2 AVB. ptn also c/o pleuritic CP " ever since i woke up from anesthesia" ptn states she is on home O2,, 2LNC. She is not on any COPD meds/inhalers, has a pulmonologist, smokes 1 PPD x50 yrs.  ptn is noted to have LE erythema, she has tend over LE    A/P  TTE c/w biventricular HF,   CTA chest: no PE, has bilateral extensive infiltrates. seen by ID, suggested to stop ABx, pulm abn on chest CT presumed to be atelectasis. will stop and observe off ABx  LE dopplers neg for DVT  s/p  volume overload, now euvolemic, Lasix drip turned off, started on Aldactone  will start on po Lasix in am  dyspnea much improved, able to lie flat  s/p pleuritic CP post abltation, now resolved on colchicine, will dc   RHC/LHC to be arranged as per cardiology  hyperglycemic 2/2 Steroids, now on prednisone taper, endo following  ptn also has varicose veins could contribute to erythema of LE 2/2 superficial thrombophlebitis.     cont  symbicort and spiriva, chloé  get PYP scan to R/O cardiac amyloid since she can now lie flat  - cont statin, Eliquis    GI ppx w PPI

## 2023-07-31 NOTE — PROGRESS NOTE ADULT - ASSESSMENT
62 y/o female with PMH of AVB s/p PPM [facility] 2018, afib on Eliquis, HTN, tobacco smoker current 0.5 ppd, and CHF, afib, s/p ablation on 7/26, who was admitted for acute CHF exacerbation    CHF exacerbation  Atrial fibrillation    - s/p afib ablation on 7/26, now in heart failure. Tele SR rates ~80-100s w/ APCs.   - Diuresis per CHF team. Now on Lasix gtt 10mg. Appreciate recs  -- Pt feeling improved  - Continue AC with Eliquis 5mg bid  - Continue Colchicine  - Appreciate Cardiology/HF input  - Pending scan to r/o Amyloidosis today. Pt states she can lie flat  - Has f/up appt with Dr Roca on 9/12/23 at 9:30am  - Continue telemetry monitoring. Keep K>4, Mg>2   64 y/o female with PMH of AVB s/p PPM [facility] 2018, afib on Eliquis, HTN, tobacco smoker current 0.5 ppd, and CHF, afib, s/p ablation on 7/26, who was admitted for acute CHF exacerbation    CHF exacerbation  Atrial fibrillation    - s/p afib ablation on 7/26, now in heart failure. Tele SR rates ~80-100s w/ APCs.   - Diuresis per CHF team. Now on Lasix gtt 10mg. Appreciate recs  -- Pt feeling improved  - Continue AC with Eliquis 5mg bid  - Continue Colchicine  - Appreciate Cardiology/HF input  - Pending scan to r/o Amyloidosis today. Pt states she can lie flat  - Cardiology considering ischemic eval when euvolemic, prefer noninvasive as pt is s/p recent ablation and on AC to minimize AC interruption.  - Has f/up appt with Dr Roca on 9/12/23 at 9:30am  - Continue telemetry monitoring. Keep K>4, Mg>2

## 2023-07-31 NOTE — PROGRESS NOTE ADULT - SUBJECTIVE AND OBJECTIVE BOX
Pt calling saw you yesterday for a uti---wasn't able to  the Bactrim last night so when got home from work took a cipro pill he had from previous problem---today he picked up his Bactrim and started it this afternoon---but before he took that he had a lot of blood in his urine when he went---little concerned about that----please call the pt. Thanks. Interval Events:  - on 3L NC (on 2-3 L at home)  - on lasix gtt  - sob improved, no cp      REVIEW OF SYSTEMS:  Negative except as documented above.      OBJECTIVE:  ICU Vital Signs Last 24 Hrs  T(C): 36.7 (31 Jul 2023 11:20), Max: 36.9 (30 Jul 2023 20:38)  T(F): 98 (31 Jul 2023 11:20), Max: 98.4 (30 Jul 2023 20:38)  HR: 105 (31 Jul 2023 16:40) (83 - 105)  BP: 118/74 (31 Jul 2023 16:40) (115/73 - 134/79)  BP(mean): --  ABP: --  ABP(mean): --  RR: 17 (31 Jul 2023 16:40) (17 - 18)  SpO2: 90% (31 Jul 2023 16:40) (90% - 93%)    O2 Parameters below as of 31 Jul 2023 16:40  Patient On (Oxygen Delivery Method): nasal cannula  O2 Flow (L/min): 4            07-30 @ 07:01 - 07-31 @ 07:00  --------------------------------------------------------  IN: 480 mL / OUT: 3700 mL / NET: -3220 mL    07-31 @ 07:01 - 07-31 @ 17:20  --------------------------------------------------------  IN: 480 mL / OUT: 1750 mL / NET: -1270 mL      CAPILLARY BLOOD GLUCOSE      POCT Blood Glucose.: 128 mg/dL (31 Jul 2023 11:51)      PHYSICAL EXAM:  General: NAD  HEENT:  EOMI, sclera anicteric, moist mucus membranes  Neck: supple  Cardiovascular: RRR  Respiratory: CTAB, no wheezes, crackles, or rhonci  Abdomen: soft, nontender  Extremities: warm and well perfused, no edema, no clubbing  Skin: no rashes  Neurological: no focal deficits    HOSPITAL MEDICATIONS:  MEDICATIONS  (STANDING):  albuterol/ipratropium for Nebulization 3 milliLiter(s) Nebulizer every 6 hours  apixaban 5 milliGRAM(s) Oral every 12 hours  atorvastatin 40 milliGRAM(s) Oral at bedtime  budesonide 160 MICROgram(s)/formoterol 4.5 MICROgram(s) Inhaler 2 Puff(s) Inhalation two times a day  chlorhexidine 2% Cloths 1 Application(s) Topical <User Schedule>  colchicine 0.6 milliGRAM(s) Oral daily  furosemide Infusion 7.5 mG/Hr (3.75 mL/Hr) IV Continuous <Continuous>  hydrALAZINE 10 milliGRAM(s) Oral three times a day  insulin glargine Injectable (LANTUS) 12 Unit(s) SubCutaneous at bedtime  insulin lispro (ADMELOG) corrective regimen sliding scale   SubCutaneous three times a day before meals  insulin lispro Injectable (ADMELOG) 4 Unit(s) SubCutaneous three times a day before meals  nicotine -   7 mG/24Hr(s) Patch 1 Patch Transdermal daily  pantoprazole    Tablet 40 milliGRAM(s) Oral before breakfast  potassium chloride    Tablet ER 40 milliEquivalent(s) Oral every 4 hours  spironolactone 25 milliGRAM(s) Oral daily  tiotropium 2.5 MICROgram(s) Inhaler 2 Puff(s) Inhalation daily    MEDICATIONS  (PRN):  acetaminophen     Tablet .. 650 milliGRAM(s) Oral every 6 hours PRN Temp greater or equal to 38C (100.4F), Moderate Pain (4 - 6)  oxycodone    5 mG/acetaminophen 325 mG 1 Tablet(s) Oral every 6 hours PRN back pain      LABS:                        13.9   10.07 )-----------( 130      ( 30 Jul 2023 04:18 )             44.0     Hgb Trend: 13.9<--, 14.1<--, 14.5<--, 15.2<--, 14.7<--  07-31    137  |  86<L>  |  20  ----------------------------<  149<H>  3.7   |  42<H>  |  0.59    Ca    9.9      31 Jul 2023 14:18  Phos  1.9     07-31  Mg     1.7     07-31    TPro  7.7  /  Alb  4.3  /  TBili  0.6  /  DBili  x   /  AST  11  /  ALT  8<L>  /  AlkPhos  96  07-30    Creatinine Trend: 0.59<--, 0.54<--, 0.46<--, 0.45<--, 0.52<--, 0.52<--    Urinalysis Basic - ( 31 Jul 2023 14:18 )    Color: x / Appearance: x / SG: x / pH: x  Gluc: 149 mg/dL / Ketone: x  / Bili: x / Urobili: x   Blood: x / Protein: x / Nitrite: x   Leuk Esterase: x / RBC: x / WBC x   Sq Epi: x / Non Sq Epi: x / Bacteria: x            MICROBIOLOGY:       RADIOLOGY:  [x] Reviewed and interpreted by me

## 2023-07-31 NOTE — PROGRESS NOTE ADULT - SUBJECTIVE AND OBJECTIVE BOX
CARDIOLOGY FOLLOW UP - Dr. Johnson  DATE OF SERVICE: 7/31/23    CC  Notably SOB with talking  No CV complaints, feeling better    REVIEW OF SYSTEMS:  CONSTITUTIONAL: No fever, weight loss, or fatigue  RESPIRATORY: No cough, wheezing, chills or hemoptysis; + Shortness of Breath  CARDIOVASCULAR: No chest pain, palpitations, passing out, dizziness, or leg swelling  GASTROINTESTINAL: No abdominal or epigastric pain. No nausea, vomiting, or hematemesis; No diarrhea or constipation. No melena or hematochezia.  VASCULAR: No edema     PHYSICAL EXAM:  T(C): 36.7 (07-31-23 @ 04:24), Max: 36.9 (07-30-23 @ 20:38)  HR: 100 (07-31-23 @ 04:24) (73 - 104)  BP: 115/73 (07-31-23 @ 04:24) (113/68 - 124/72)  RR: 18 (07-31-23 @ 04:24) (17 - 18)  SpO2: 93% (07-31-23 @ 04:24) (91% - 93%)  Wt(kg): --  I&O's Summary    30 Jul 2023 07:01  -  31 Jul 2023 07:00  --------------------------------------------------------  IN: 480 mL / OUT: 3700 mL / NET: -3220 mL    31 Jul 2023 07:01  -  31 Jul 2023 10:31  --------------------------------------------------------  IN: 0 mL / OUT: 900 mL / NET: -900 mL        Appearance: Normal	  Cardiovascular: Normal S1 S2,Tachycardia, No JVD, No murmurs  Respiratory: Crackles b/l  Gastrointestinal:  Soft, Non-tender, + BS	  Extremities: Normal range of motion, No clubbing, cyanosis. +B/l le edema      Home Medications:  Eliquis 5 mg oral tablet: 1 orally 2 times a day (26 Jul 2023 11:39)  Lasix 40 mg oral tablet: 1 orally once a day (26 Jul 2023 11:42)  simvastatin 20 mg oral tablet: 1 orally once a day (26 Jul 2023 11:42)      MEDICATIONS  (STANDING):  albuterol/ipratropium for Nebulization 3 milliLiter(s) Nebulizer every 6 hours  apixaban 5 milliGRAM(s) Oral every 12 hours  atorvastatin 40 milliGRAM(s) Oral at bedtime  budesonide 160 MICROgram(s)/formoterol 4.5 MICROgram(s) Inhaler 2 Puff(s) Inhalation two times a day  chlorhexidine 2% Cloths 1 Application(s) Topical <User Schedule>  colchicine 0.6 milliGRAM(s) Oral daily  furosemide Infusion 10 mG/Hr (5 mL/Hr) IV Continuous <Continuous>  hydrALAZINE 10 milliGRAM(s) Oral three times a day  insulin glargine Injectable (LANTUS) 12 Unit(s) SubCutaneous at bedtime  insulin lispro (ADMELOG) corrective regimen sliding scale   SubCutaneous three times a day before meals  insulin lispro Injectable (ADMELOG) 4 Unit(s) SubCutaneous three times a day before meals  nicotine -   7 mG/24Hr(s) Patch 1 Patch Transdermal daily  pantoprazole    Tablet 40 milliGRAM(s) Oral before breakfast  tiotropium 2.5 MICROgram(s) Inhaler 2 Puff(s) Inhalation daily      TELEMETRY: SR 	    ECG:  	  RADIOLOGY:   DIAGNOSTIC TESTING:  [ ] Echocardiogram:  [ ]  Catheterization:  [ ] Stress Test:    OTHER: 	    LABS:	 	                            13.9   10.07 )-----------( 130      ( 30 Jul 2023 04:18 )             44.0     07-31    139  |  85<L>  |  20  ----------------------------<  106<H>  3.5   |  41<H>  |  0.54    Ca    10.4      31 Jul 2023 06:17  Phos  1.9     07-31  Mg     1.8     07-31    TPro  7.7  /  Alb  4.3  /  TBili  0.6  /  DBili  x   /  AST  11  /  ALT  8<L>  /  AlkPhos  96  07-30

## 2023-07-31 NOTE — PROGRESS NOTE ADULT - SUBJECTIVE AND OBJECTIVE BOX
Patient is a 63y old  Female who presents with a chief complaint of shortness of breath (28 Jul 2023 12:33)      SUBJECTIVE / OVERNIGHT EVENTS: ptn is able to lie flat, she is euvolemic, will try to PYP study in nuclear medicine    MEDICATIONS  (STANDING):  albuterol/ipratropium for Nebulization 3 milliLiter(s) Nebulizer every 6 hours  apixaban 5 milliGRAM(s) Oral every 12 hours  atorvastatin 40 milliGRAM(s) Oral at bedtime  budesonide 160 MICROgram(s)/formoterol 4.5 MICROgram(s) Inhaler 2 Puff(s) Inhalation two times a day  chlorhexidine 2% Cloths 1 Application(s) Topical <User Schedule>  colchicine 0.6 milliGRAM(s) Oral daily  furosemide Infusion 7.5 mG/Hr (3.75 mL/Hr) IV Continuous <Continuous>  hydrALAZINE 10 milliGRAM(s) Oral three times a day  insulin glargine Injectable (LANTUS) 12 Unit(s) SubCutaneous at bedtime  insulin lispro (ADMELOG) corrective regimen sliding scale   SubCutaneous three times a day before meals  insulin lispro Injectable (ADMELOG) 4 Unit(s) SubCutaneous three times a day before meals  nicotine -   7 mG/24Hr(s) Patch 1 Patch Transdermal daily  pantoprazole    Tablet 40 milliGRAM(s) Oral before breakfast  spironolactone 25 milliGRAM(s) Oral daily  tiotropium 2.5 MICROgram(s) Inhaler 2 Puff(s) Inhalation daily    MEDICATIONS  (PRN):  acetaminophen     Tablet .. 650 milliGRAM(s) Oral every 6 hours PRN Temp greater or equal to 38C (100.4F), Moderate Pain (4 - 6)  oxycodone    5 mG/acetaminophen 325 mG 1 Tablet(s) Oral every 6 hours PRN back pain      Vital Signs Last 24 Hrs  T(F): 98.1 (07-31-23 @ 19:43), Max: 98.4 (07-30-23 @ 20:38)  HR: 105 (07-31-23 @ 19:43) (83 - 105)  BP: 129/71 (07-31-23 @ 19:43) (115/73 - 134/79)  RR: 18 (07-31-23 @ 19:43) (17 - 18)  SpO2: 91% (07-31-23 @ 19:43) (90% - 93%)  Telemetry:   CAPILLARY BLOOD GLUCOSE      POCT Blood Glucose.: 104 mg/dL (31 Jul 2023 18:40)  POCT Blood Glucose.: 128 mg/dL (31 Jul 2023 11:51)  POCT Blood Glucose.: 102 mg/dL (31 Jul 2023 07:27)  POCT Blood Glucose.: 146 mg/dL (30 Jul 2023 21:28)    I&O's Summary    30 Jul 2023 07:01  -  31 Jul 2023 07:00  --------------------------------------------------------  IN: 480 mL / OUT: 3700 mL / NET: -3220 mL    31 Jul 2023 07:01  -  31 Jul 2023 20:00  --------------------------------------------------------  IN: 480 mL / OUT: 1750 mL / NET: -1270 mL        PHYSICAL EXAM:  GENERAL: NAD, well-developed  HEAD:  Atraumatic, Normocephalic  EYES: EOMI, PERRLA, conjunctiva and sclera clear  NECK: Supple, No JVD  CHEST/LUNG: Clear to auscultation bilaterally; No wheeze  HEART: Regular rate and rhythm; No murmurs, rubs, or gallops  ABDOMEN: Soft, Nontender, Nondistended; Bowel sounds present  EXTREMITIES:  2+ Peripheral Pulses, No clubbing, cyanosis, or edema  PSYCH: AAOx3  NEUROLOGY: non-focal  SKIN: No rashes or lesions    LABS:                        13.9   10.07 )-----------( 130      ( 30 Jul 2023 04:18 )             44.0     07-31    137  |  86<L>  |  20  ----------------------------<  149<H>  3.7   |  42<H>  |  0.59    Ca    9.9      31 Jul 2023 14:18  Phos  1.9     07-31  Mg     1.7     07-31    TPro  7.7  /  Alb  4.3  /  TBili  0.6  /  DBili  x   /  AST  11  /  ALT  8<L>  /  AlkPhos  96  07-30          Urinalysis Basic - ( 31 Jul 2023 14:18 )    Color: x / Appearance: x / SG: x / pH: x  Gluc: 149 mg/dL / Ketone: x  / Bili: x / Urobili: x   Blood: x / Protein: x / Nitrite: x   Leuk Esterase: x / RBC: x / WBC x   Sq Epi: x / Non Sq Epi: x / Bacteria: x        RADIOLOGY & ADDITIONAL TESTS:    Imaging Personally Reviewed:    Consultant(s) Notes Reviewed:      Care Discussed with Consultants/Other Providers:

## 2023-07-31 NOTE — PROGRESS NOTE ADULT - SUBJECTIVE AND OBJECTIVE BOX
Chief complaint  Patient is a 63y old  Female who presents with a chief complaint of shortness of breath (28 Jul 2023 12:33)         Labs and Fingersticks  CAPILLARY BLOOD GLUCOSE      POCT Blood Glucose.: 102 mg/dL (31 Jul 2023 07:27)  POCT Blood Glucose.: 146 mg/dL (30 Jul 2023 21:28)  POCT Blood Glucose.: 162 mg/dL (30 Jul 2023 17:19)  POCT Blood Glucose.: 157 mg/dL (30 Jul 2023 12:29)      Anion Gap: 13 (07-31 @ 06:17)  Anion Gap: 9 (07-30 @ 04:18)      Calcium: 10.4 (07-31 @ 06:17)  Calcium: 10.4 (07-30 @ 04:18)  Albumin: 4.3 (07-30 @ 04:18)    Alanine Aminotransferase (ALT/SGPT): 8 *L* (07-30 @ 04:18)  Alkaline Phosphatase: 96 (07-30 @ 04:18)  Aspartate Aminotransferase (AST/SGOT): 11 (07-30 @ 04:18)        07-31    139  |  85<L>  |  20  ----------------------------<  106<H>  3.5   |  41<H>  |  0.54    Ca    10.4      31 Jul 2023 06:17  Phos  1.9     07-31  Mg     1.8     07-31    TPro  7.7  /  Alb  4.3  /  TBili  0.6  /  DBili  x   /  AST  11  /  ALT  8<L>  /  AlkPhos  96  07-30                        13.9   10.07 )-----------( 130      ( 30 Jul 2023 04:18 )             44.0     Medications  MEDICATIONS  (STANDING):  albuterol/ipratropium for Nebulization 3 milliLiter(s) Nebulizer every 6 hours  apixaban 5 milliGRAM(s) Oral every 12 hours  atorvastatin 40 milliGRAM(s) Oral at bedtime  budesonide 160 MICROgram(s)/formoterol 4.5 MICROgram(s) Inhaler 2 Puff(s) Inhalation two times a day  chlorhexidine 2% Cloths 1 Application(s) Topical <User Schedule>  colchicine 0.6 milliGRAM(s) Oral daily  furosemide Infusion 10 mG/Hr (5 mL/Hr) IV Continuous <Continuous>  hydrALAZINE 10 milliGRAM(s) Oral three times a day  insulin glargine Injectable (LANTUS) 15 Unit(s) SubCutaneous at bedtime  insulin lispro (ADMELOG) corrective regimen sliding scale   SubCutaneous three times a day before meals  insulin lispro Injectable (ADMELOG) 4 Unit(s) SubCutaneous three times a day before meals  nicotine -   7 mG/24Hr(s) Patch 1 Patch Transdermal daily  pantoprazole    Tablet 40 milliGRAM(s) Oral before breakfast  tiotropium 2.5 MICROgram(s) Inhaler 2 Puff(s) Inhalation daily      Physical Exam  General: Patient comfortable in bed   Vital Signs Last 12 Hrs  T(F): 98.1 (07-31-23 @ 04:24), Max: 98.1 (07-31-23 @ 04:24)  HR: 100 (07-31-23 @ 04:24) (100 - 100)  BP: 115/73 (07-31-23 @ 04:24) (115/73 - 115/73)  BP(mean): --  RR: 18 (07-31-23 @ 04:24) (18 - 18)  SpO2: 93% (07-31-23 @ 04:24) (93% - 93%)    CVS: S1S2   Respiratory: No wheezing, no crepitations  GI: Abdomen soft, bowel sounds positive  Musculoskeletal:  moves all extremities  : Voiding

## 2023-07-31 NOTE — PROGRESS NOTE ADULT - ATTENDING COMMENTS
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 Referred for Afib ablation with Dr. Roca. S/p procedure (7/26) w/o known complications. Post procedure noted increased oxygen requirements (4-5L NC, sating ~88-90%). Pulm consulted for noted desat. Patient follows [facility], where she had dx of COPD. States she currently uses albuterol, but had used Spiriva in past. On 2L O2 via NC. TTE: EF 63%, grade 3 diastolic dysfunction, LA/RV/RA dilation, PASP 54. Suspect acute on chronic HF leading to volume overload and hypoxia.  Suspect decompensated HF by virtue of CXR and PE finding s c/w volume overload  Agree with plan as outlined above.

## 2023-07-31 NOTE — PROGRESS NOTE ADULT - ASSESSMENT
ECHO 7/27/23: EF 63%; nl LV sys fx,  no regional wall motion abnormalities seen. severe (grade 3) left ventricular diastolic dysfunction, with elevated filling pressure.Estimated pulmonary artery systolic pressure is 54 mmHg. The right atrium is severely dilated. The left atrium is severely dilated   ARSH 7/26/23:  No pericardial effusion seen. Moderate tricuspid regurgitation. Estimated pulmonary artery systolic pressure is 51 mmHg, consistent with moderate pulmonary hypertension. Left ventricular endocardium is not well visualized; however, the left ventricular systolic function appears grossly normal.  NM PET/CT Myocardial Sarcoidosis FDG (04.20.23 @ 13:19) >  OTHER STUDIES USED FOR CORRELATION: None  IMPRESSION: FDG PET/CT of the heart shows no evidence of FDG-avid   myocardial inflammation. Diffuse myocardial hypermetabolism seen on prior   study dated 4/6/2023, is not seen on the current study, and most likely   was due to inadequate dietary preparation.    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 Now referred for Afib Ablation.  she is now SP AF ablation and admitted with acute CHF     # Afib s/p afib ablation on 7/26  -EP f/u  -in nsr   -ac on eliquis     #acute on chronic Diastolic CHF   -s/p ablation, post acute CHF   -improving, vol status improved  -HF team f/u  -on lasix drip, responding well - continue for now   -alkalosis, low cl on bmp, cont to monitor   -Outpt PET/CT in April 2023 w/o evidence of Sarcoid.   -TTE reveal Biatrial enlargement ?amyloid  -cardiac mri/amlyoid w/u per HF  -reported CT cor wnl 12/2022  -consider repeat ischemic eval once vol status optimal   -le doppler neg for dvt     # AVB s/p PPM   -stable, ep f/u    #COPD  -pulm fu

## 2023-07-31 NOTE — PROGRESS NOTE ADULT - ASSESSMENT
Briefly, 62y/o F w/ h/o afib (since 50s; on AC), HFpEF, heart block (2018) s/p Abbott dual chamber PPM c/b ? cardiac arrest requiring shock (per daughter), aflutter, active tobacco use (0.5 ppd x 30 years), HTN presented on 7/26 for elective atypical flutter ablation. Underwent ablation but was noted to be fluid overloaded with progressive hypoxia. TTE showed EF 65%, severe biatrial enlargement, mod TR, mod RV dysfunction, dilated IVC. Of note, had been ruled out for sarcoid with PET scan in April. Does note numbness of her hands and possibly told she had carpal tunnel syndrome. Undergoing diuresis and evaluation for amyloidosis. Overall stage C HF, NYHA class III with volume overload. Unclear etiology of HFpEF but would exclude amyloidosis given severe biatrial enlargement and neuropathy (low suspicion) and would consider genetic testing given afib at young age and severe biatrial scarring.       Cardiac Studies  TTE 7/26: grossly normal appearing LVF, mod RVE, BiAtrial enlargement, mild MR, mod TR, PASP 51mmHg  Coronary CT 12/22 normal

## 2023-07-31 NOTE — PROGRESS NOTE ADULT - SUBJECTIVE AND OBJECTIVE BOX
24H hour events: Tele with NSR rates ~80-100s w/ APCs. Pt feeling well. States SOB has improved over the weekend. Denies chest pain/palpitations/dizziness.     MEDICATIONS:  apixaban 5 milliGRAM(s) Oral every 12 hours  furosemide Infusion 10 mG/Hr IV Continuous <Continuous>  hydrALAZINE 10 milliGRAM(s) Oral three times a day  albuterol/ipratropium for Nebulization 3 milliLiter(s) Nebulizer every 6 hours  budesonide 160 MICROgram(s)/formoterol 4.5 MICROgram(s) Inhaler 2 Puff(s) Inhalation two times a day  tiotropium 2.5 MICROgram(s) Inhaler 2 Puff(s) Inhalation daily  acetaminophen     Tablet .. 650 milliGRAM(s) Oral every 6 hours PRN  oxycodone    5 mG/acetaminophen 325 mG 1 Tablet(s) Oral every 6 hours PRN  pantoprazole    Tablet 40 milliGRAM(s) Oral before breakfast  atorvastatin 40 milliGRAM(s) Oral at bedtime  colchicine 0.6 milliGRAM(s) Oral daily  insulin glargine Injectable (LANTUS) 12 Unit(s) SubCutaneous at bedtime  insulin lispro (ADMELOG) corrective regimen sliding scale   SubCutaneous three times a day before meals  insulin lispro Injectable (ADMELOG) 4 Unit(s) SubCutaneous three times a day before meals  chlorhexidine 2% Cloths 1 Application(s) Topical <User Schedule>      REVIEW OF SYSTEMS:  Complete 12point ROS negative.    PHYSICAL EXAM:  T(C): 36.7 (07-31-23 @ 04:24), Max: 36.9 (07-30-23 @ 20:38)  HR: 100 (07-31-23 @ 04:24) (73 - 104)  BP: 115/73 (07-31-23 @ 04:24) (113/68 - 124/72)  RR: 18 (07-31-23 @ 04:24) (17 - 18)  SpO2: 93% (07-31-23 @ 04:24) (91% - 93%)  Wt(kg): --  I&O's Summary    30 Jul 2023 07:01  -  31 Jul 2023 07:00  --------------------------------------------------------  IN: 480 mL / OUT: 3700 mL / NET: -3220 mL    31 Jul 2023 07:01  -  31 Jul 2023 10:09  --------------------------------------------------------  IN: 0 mL / OUT: 900 mL / NET: -900 mL        Appearance: Normal	  HEENT: NC/AT  Cardiovascular: Normal S1 S2, No JVD  Respiratory: crackles bilaterally  Psychiatry: A & O x 3, Mood & affect appropriate  Skin: Rt groin access site benign, healing well  Neurologic: Non-focal  Extremities: Trace BLE pitting edema  LABS:	 	    CBC Full  -  ( 30 Jul 2023 04:18 )  WBC Count : 10.07 K/uL  Hemoglobin : 13.9 g/dL  Hematocrit : 44.0 %  Platelet Count - Automated : 130 K/uL  Mean Cell Volume : 95.4 fl  Mean Cell Hemoglobin : 30.2 pg  Mean Cell Hemoglobin Concentration : 31.6 gm/dL      07-31    139  |  85<L>  |  20  ----------------------------<  106<H>  3.5   |  41<H>  |  0.54  07-30    136  |  90<L>  |  22  ----------------------------<  161<H>  3.8   |  37<H>  |  0.46<L>    Ca    10.4      31 Jul 2023 06:17  Ca    10.4      30 Jul 2023 04:18  Phos  1.9     07-31  Phos  1.5     07-30  Mg     1.8     07-31  Mg     2.0     07-30    TPro  7.7  /  Alb  4.3  /  TBili  0.6  /  DBili  x   /  AST  11  /  ALT  8<L>  /  AlkPhos  96  07-30      proBNP: Pro-Brain Natriuretic Peptide (07.28.23 @ 06:33)    Pro-Brain Natriuretic Peptide: 4216 pg/mL    TSH: Free Thyroxine, Serum in AM (07.29.23 @ 06:44)    Free Thyroxine, Serum: 1.1 ng/dL  	  RADIOLOGY: < from: Xray Chest 1 View- PORTABLE-Urgent (Xray Chest 1 View- PORTABLE-Urgent .) (07.29.23 @ 22:31) >  FINDINGS:The cardiac silhouette is enlarged. There is a left-sided   dual-lead pacemaker. There are bilateral pleural effusions and/or   bibasilar atelectasis, not significantly changed.    IMPRESSION: Bilateral pleural effusions and/or bibasilar atelectasis,  unchanged.    < end of copied text >      PREVIOUS DIAGNOSTIC TESTING:    [ ] Echocardiogram: < from: TTE W or WO Ultrasound Enhancing Agent (07.27.23 @ 08:52) >  CONCLUSIONS:      1. Normal left ventricular cavity size. The left ventricular wall thickness is normal. The left ventricular systolic function is normal with an ejection fraction of 63 % by Jones's method of disks. There are no regional wall motion abnormalities seen.   2. There is severe (grade 3) left ventricular diastolic dysfunction, with elevated filling pressure.   3. Severely enlarged right ventricular cavity size, normal right ventricular wall thickness and reduced systolic right ventricular function.The tricuspid annular plane systolic excursion (TAPSE) is 1.3 cm (normal >=1.7 cm).   4. The left atrium is severely dilated in size.   5. The right atrium is severely dilated in size.   6. Trace mitral regurgitation.   7. Estimated pulmonary artery systolic pressure is 54 mmHg.   8. No pericardial effusion seen.   9. Compared to the transesophageal echocardiogram performed on 7/26/2023 Patient has evidence of elevated left sided filling pressures with new septal flattening.    < end of copied text >

## 2023-07-31 NOTE — PROGRESS NOTE ADULT - ASSESSMENT
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 Referred for Afib ablation with Dr. Roca. S/p procedure (7/26) w/o known complications. Post procedure noted increased oxygen requirements (4-5L NC, sating ~88-90%). Pulm consulted for noted desat. Patient follows [facility], where she had dx of COPD. States she currently uses albuterol, but had used Spiriva in past. On 2L O2 via NC.     #HYPOXIA  #2/2 CHF Exacerbation vs unlikely COPD Exacerbation   On 2-3L home O2, currently on 3L after diuresis   - CXR: b/l pleural effusion  - TTE: EF 63%, grade 3 diastolic dysfunction, LA/RV/RA dilation, PASP 54  - on lasix 40 PO qday at home; currently on lasix gtt per cardiology  - hypoxemia most likely due to pulmonary edema  - strict I/O, daily weights  - wean NC as tolerated  - continue symbicort 160-4.5 BID, spiriva qd  - duonebs prn (can stop standing duonebs)  - oob to chair, incentive spirometry    Pulmonary to sign off.  Patient can folow up with her outpatient pulmonologist, but can provide information for our pulmonary office if she chooses to switch providers:  [facility]  [address] [place], [place] [id number] [phone]      Lisa Rosas MD  Pulmonary and Critical Care Fellow

## 2023-07-31 NOTE — PROGRESS NOTE ADULT - SUBJECTIVE AND OBJECTIVE BOX
Interval History:  feels better  urinating well  denies complaints    Medications:  acetaminophen     Tablet .. 650 milliGRAM(s) Oral every 6 hours PRN  albuterol/ipratropium for Nebulization 3 milliLiter(s) Nebulizer every 6 hours  apixaban 5 milliGRAM(s) Oral every 12 hours  atorvastatin 40 milliGRAM(s) Oral at bedtime  budesonide 160 MICROgram(s)/formoterol 4.5 MICROgram(s) Inhaler 2 Puff(s) Inhalation two times a day  chlorhexidine 2% Cloths 1 Application(s) Topical <User Schedule>  colchicine 0.6 milliGRAM(s) Oral daily  furosemide Infusion 7.5 mG/Hr IV Continuous <Continuous>  hydrALAZINE 10 milliGRAM(s) Oral three times a day  insulin glargine Injectable (LANTUS) 12 Unit(s) SubCutaneous at bedtime  insulin lispro (ADMELOG) corrective regimen sliding scale   SubCutaneous three times a day before meals  insulin lispro Injectable (ADMELOG) 4 Unit(s) SubCutaneous three times a day before meals  nicotine -   7 mG/24Hr(s) Patch 1 Patch Transdermal daily  oxycodone    5 mG/acetaminophen 325 mG 1 Tablet(s) Oral every 6 hours PRN  pantoprazole    Tablet 40 milliGRAM(s) Oral before breakfast  potassium phosphate IVPB 15 milliMole(s) IV Intermittent once  spironolactone 25 milliGRAM(s) Oral daily  tiotropium 2.5 MICROgram(s) Inhaler 2 Puff(s) Inhalation daily      Vitals:  T(C): 36.7 (23 @ 19:43), Max: 36.7 (23 @ 04:24)  HR: 105 (23 @ 19:43) (84 - 105)  BP: 129/71 (23 @ 19:43) (115/73 - 134/79)  BP(mean): --  RR: 18 (23 @ 19:43) (17 - 18)  SpO2: 91% (23 @ 19:43) (90% - 93%)    Daily     Daily Weight in k.8 (2023 13:15)        I&O's Summary    2023 07:01  -  2023 07:00  --------------------------------------------------------  IN: 480 mL / OUT: 3700 mL / NET: -3220 mL    2023 07:01  -  2023 21:42  --------------------------------------------------------  IN: 480 mL / OUT: 2800 mL / NET: -2320 mL        Physical Exam:  Appearance: No Acute Distress  HEENT: PERRL  Neck: JVD elevated  Cardiovascular: Normal S1 S2, No murmurs/rubs/gallops  Respiratory: dec BS b/l   Gastrointestinal: Soft, Non-tender	  Skin: No cyanosis	  Neurologic: Non-focal  Extremities: improving LE edema  Psychiatry: A & O x 3, Mood & affect appropriate    Labs:                        13.9   10.07 )-----------( 130      ( 2023 04:18 )             44.0     07-31    137  |  89<L>  |  20  ----------------------------<  106<H>  4.1   |  40<H>  |  0.67    Ca    10.1      2023 20:47  Phos  1.8       Mg     2.0         TPro  7.7  /  Alb  4.3  /  TBili  0.6  /  DBili  x   /  AST  11  /  ALT  8<L>  /  AlkPhos  96

## 2023-08-01 LAB
ANION GAP SERPL CALC-SCNC: 15 MMOL/L — SIGNIFICANT CHANGE UP (ref 5–17)
ANION GAP SERPL CALC-SCNC: 9 MMOL/L — SIGNIFICANT CHANGE UP (ref 5–17)
BUN SERPL-MCNC: 20 MG/DL — SIGNIFICANT CHANGE UP (ref 7–23)
BUN SERPL-MCNC: 23 MG/DL — SIGNIFICANT CHANGE UP (ref 7–23)
CALCIUM SERPL-MCNC: 10.4 MG/DL — SIGNIFICANT CHANGE UP (ref 8.4–10.5)
CALCIUM SERPL-MCNC: 11.3 MG/DL — HIGH (ref 8.4–10.5)
CHLORIDE SERPL-SCNC: 82 MMOL/L — LOW (ref 96–108)
CHLORIDE SERPL-SCNC: 87 MMOL/L — LOW (ref 96–108)
CO2 SERPL-SCNC: 38 MMOL/L — HIGH (ref 22–31)
CO2 SERPL-SCNC: 40 MMOL/L — HIGH (ref 22–31)
CREAT SERPL-MCNC: 0.58 MG/DL — SIGNIFICANT CHANGE UP (ref 0.5–1.3)
CREAT SERPL-MCNC: 0.68 MG/DL — SIGNIFICANT CHANGE UP (ref 0.5–1.3)
EGFR: 102 ML/MIN/1.73M2 — SIGNIFICANT CHANGE UP
EGFR: 98 ML/MIN/1.73M2 — SIGNIFICANT CHANGE UP
GLUCOSE BLDC GLUCOMTR-MCNC: 102 MG/DL — HIGH (ref 70–99)
GLUCOSE BLDC GLUCOMTR-MCNC: 125 MG/DL — HIGH (ref 70–99)
GLUCOSE BLDC GLUCOMTR-MCNC: 126 MG/DL — HIGH (ref 70–99)
GLUCOSE BLDC GLUCOMTR-MCNC: 135 MG/DL — HIGH (ref 70–99)
GLUCOSE SERPL-MCNC: 122 MG/DL — HIGH (ref 70–99)
GLUCOSE SERPL-MCNC: 92 MG/DL — SIGNIFICANT CHANGE UP (ref 70–99)
MAGNESIUM SERPL-MCNC: 2 MG/DL — SIGNIFICANT CHANGE UP (ref 1.6–2.6)
MAGNESIUM SERPL-MCNC: 2.2 MG/DL — SIGNIFICANT CHANGE UP (ref 1.6–2.6)
POTASSIUM SERPL-MCNC: 4.2 MMOL/L — SIGNIFICANT CHANGE UP (ref 3.5–5.3)
POTASSIUM SERPL-MCNC: 4.5 MMOL/L — SIGNIFICANT CHANGE UP (ref 3.5–5.3)
POTASSIUM SERPL-SCNC: 4.2 MMOL/L — SIGNIFICANT CHANGE UP (ref 3.5–5.3)
POTASSIUM SERPL-SCNC: 4.5 MMOL/L — SIGNIFICANT CHANGE UP (ref 3.5–5.3)
SODIUM SERPL-SCNC: 135 MMOL/L — SIGNIFICANT CHANGE UP (ref 135–145)
SODIUM SERPL-SCNC: 136 MMOL/L — SIGNIFICANT CHANGE UP (ref 135–145)

## 2023-08-01 RX ORDER — INSULIN GLARGINE 100 [IU]/ML
10 INJECTION, SOLUTION SUBCUTANEOUS AT BEDTIME
Refills: 0 | Status: DISCONTINUED | OUTPATIENT
Start: 2023-08-01 | End: 2023-08-03

## 2023-08-01 RX ADMIN — Medication 1 PATCH: at 07:50

## 2023-08-01 RX ADMIN — Medication 4 UNIT(S): at 12:04

## 2023-08-01 RX ADMIN — INSULIN GLARGINE 10 UNIT(S): 100 INJECTION, SOLUTION SUBCUTANEOUS at 22:36

## 2023-08-01 RX ADMIN — Medication 4 UNIT(S): at 17:31

## 2023-08-01 RX ADMIN — Medication 20 MILLIGRAM(S): at 05:41

## 2023-08-01 RX ADMIN — Medication 3 MILLILITER(S): at 00:45

## 2023-08-01 RX ADMIN — TIOTROPIUM BROMIDE 2 PUFF(S): 18 CAPSULE ORAL; RESPIRATORY (INHALATION) at 11:13

## 2023-08-01 RX ADMIN — SPIRONOLACTONE 25 MILLIGRAM(S): 25 TABLET, FILM COATED ORAL at 05:43

## 2023-08-01 RX ADMIN — Medication 0.6 MILLIGRAM(S): at 11:13

## 2023-08-01 RX ADMIN — CHLORHEXIDINE GLUCONATE 1 APPLICATION(S): 213 SOLUTION TOPICAL at 05:41

## 2023-08-01 RX ADMIN — Medication 10 MILLIGRAM(S): at 14:18

## 2023-08-01 RX ADMIN — Medication 3 MILLILITER(S): at 17:30

## 2023-08-01 RX ADMIN — PANTOPRAZOLE SODIUM 40 MILLIGRAM(S): 20 TABLET, DELAYED RELEASE ORAL at 05:41

## 2023-08-01 RX ADMIN — ATORVASTATIN CALCIUM 40 MILLIGRAM(S): 80 TABLET, FILM COATED ORAL at 21:08

## 2023-08-01 RX ADMIN — Medication 4 UNIT(S): at 08:06

## 2023-08-01 RX ADMIN — APIXABAN 5 MILLIGRAM(S): 2.5 TABLET, FILM COATED ORAL at 08:07

## 2023-08-01 RX ADMIN — BUDESONIDE AND FORMOTEROL FUMARATE DIHYDRATE 2 PUFF(S): 160; 4.5 AEROSOL RESPIRATORY (INHALATION) at 06:51

## 2023-08-01 RX ADMIN — APIXABAN 5 MILLIGRAM(S): 2.5 TABLET, FILM COATED ORAL at 21:08

## 2023-08-01 RX ADMIN — BUDESONIDE AND FORMOTEROL FUMARATE DIHYDRATE 2 PUFF(S): 160; 4.5 AEROSOL RESPIRATORY (INHALATION) at 17:32

## 2023-08-01 RX ADMIN — Medication 10 MILLIGRAM(S): at 05:41

## 2023-08-01 RX ADMIN — Medication 3 MILLILITER(S): at 11:13

## 2023-08-01 RX ADMIN — Medication 3 MILLILITER(S): at 05:41

## 2023-08-01 RX ADMIN — Medication 1 PATCH: at 11:13

## 2023-08-01 RX ADMIN — Medication 10 MILLIGRAM(S): at 21:08

## 2023-08-01 RX ADMIN — Medication 1 PATCH: at 19:36

## 2023-08-01 NOTE — PROGRESS NOTE ADULT - SUBJECTIVE AND OBJECTIVE BOX
Patient is a 63y old  Female who presents with a chief complaint of ablation (31 Jul 2023 21:42)      SUBJECTIVE / OVERNIGHT EVENTS: ptn feels well, EPS doesnt recommend to have card cath for the next 2 months, ptn is cleared for DC    MEDICATIONS  (STANDING):  albuterol/ipratropium for Nebulization 3 milliLiter(s) Nebulizer every 6 hours  apixaban 5 milliGRAM(s) Oral every 12 hours  atorvastatin 40 milliGRAM(s) Oral at bedtime  budesonide 160 MICROgram(s)/formoterol 4.5 MICROgram(s) Inhaler 2 Puff(s) Inhalation two times a day  chlorhexidine 2% Cloths 1 Application(s) Topical <User Schedule>  colchicine 0.6 milliGRAM(s) Oral daily  furosemide Infusion 7.5 mG/Hr (3.75 mL/Hr) IV Continuous <Continuous>  hydrALAZINE 10 milliGRAM(s) Oral three times a day  insulin glargine Injectable (LANTUS) 10 Unit(s) SubCutaneous at bedtime  insulin lispro (ADMELOG) corrective regimen sliding scale   SubCutaneous three times a day before meals  insulin lispro Injectable (ADMELOG) 4 Unit(s) SubCutaneous three times a day before meals  nicotine -   7 mG/24Hr(s) Patch 1 Patch Transdermal daily  pantoprazole    Tablet 40 milliGRAM(s) Oral before breakfast  spironolactone 25 milliGRAM(s) Oral daily  tiotropium 2.5 MICROgram(s) Inhaler 2 Puff(s) Inhalation daily    MEDICATIONS  (PRN):  acetaminophen     Tablet .. 650 milliGRAM(s) Oral every 6 hours PRN Temp greater or equal to 38C (100.4F), Moderate Pain (4 - 6)  oxycodone    5 mG/acetaminophen 325 mG 1 Tablet(s) Oral every 6 hours PRN back pain      Vital Signs Last 24 Hrs  T(F): 98.3 (08-01-23 @ 11:11), Max: 98.6 (08-01-23 @ 04:21)  HR: 76 (08-01-23 @ 16:48) (76 - 100)  BP: 118/75 (08-01-23 @ 16:48) (114/63 - 118/75)  RR: 18 (08-01-23 @ 14:25) (18 - 18)  SpO2: 94% (08-01-23 @ 14:25) (93% - 95%)  Telemetry:   CAPILLARY BLOOD GLUCOSE      POCT Blood Glucose.: 135 mg/dL (01 Aug 2023 17:09)  POCT Blood Glucose.: 125 mg/dL (01 Aug 2023 11:46)  POCT Blood Glucose.: 102 mg/dL (01 Aug 2023 07:36)  POCT Blood Glucose.: 111 mg/dL (31 Jul 2023 21:03)    I&O's Summary    31 Jul 2023 07:01  -  01 Aug 2023 07:00  --------------------------------------------------------  IN: 480 mL / OUT: 4200 mL / NET: -3720 mL    01 Aug 2023 07:01  -  01 Aug 2023 19:50  --------------------------------------------------------  IN: 440 mL / OUT: 1900 mL / NET: -1460 mL        PHYSICAL EXAM:  GENERAL: NAD, well-developed  HEAD:  Atraumatic, Normocephalic  EYES: EOMI, PERRLA, conjunctiva and sclera clear  NECK: Supple, No JVD  CHEST/LUNG: Clear to auscultation bilaterally; No wheeze  HEART: Regular rate and rhythm; No murmurs, rubs, or gallops  ABDOMEN: Soft, Nontender, Nondistended; Bowel sounds present  EXTREMITIES:  2+ Peripheral Pulses, No clubbing, cyanosis, or edema  PSYCH: AAOx3  NEUROLOGY: non-focal  SKIN: No rashes or lesions    LABS:    08-01    135  |  82<L>  |  23  ----------------------------<  122<H>  4.2   |  38<H>  |  0.68    Ca    11.3<H>      01 Aug 2023 17:54  Phos  1.8     07-31  Mg     2.0     08-01            Urinalysis Basic - ( 01 Aug 2023 17:54 )    Color: x / Appearance: x / SG: x / pH: x  Gluc: 122 mg/dL / Ketone: x  / Bili: x / Urobili: x   Blood: x / Protein: x / Nitrite: x   Leuk Esterase: x / RBC: x / WBC x   Sq Epi: x / Non Sq Epi: x / Bacteria: x        RADIOLOGY & ADDITIONAL TESTS:    Imaging Personally Reviewed:    Consultant(s) Notes Reviewed:      Care Discussed with Consultants/Other Providers:

## 2023-08-01 NOTE — PROGRESS NOTE ADULT - ASSESSMENT
64 yo woman admitted for Afib ablation, post procedure developed diffuse rales and fluid overload.   ptn transferred to my service 2/2 acute hypoxic respiratory failure, systolic  R heart failure and diastolic L heart failure.. awaiting HF consult, s/p Afib ablation, now in sinus, on ELiquis. ptn is a chronic smoker, h/o PPM 2/2 AVB. ptn also c/o pleuritic CP " ever since i woke up from anesthesia" ptn states she is on home O2,, 2LNC. She is not on any COPD meds/inhalers, has a pulmonologist, smokes 1 PPD x50 yrs.  ptn is noted to have LE erythema, she has tend over LE    A/P  TTE c/w biventricular HF,   CTA chest: no PE, has bilateral extensive infiltrates. seen by ID, suggested to stop ABx, pulm abn on chest CT presumed to be atelectasis. will stop and observe off ABx  LE dopplers neg for DVT  s/p  volume overload, now euvolemic, Lasix drip turned off, started on Aldactone  will start on po Lasix in am  dyspnea much improved, able to lie flat  s/p pleuritic CP post abltation, now resolved on colchicine, will dc   RHC/LHC to be arranged as per cardiology  hyperglycemic 2/2 Steroids, now on prednisone taper, endo following  ptn also has varicose veins could contribute to erythema of LE 2/2 superficial thrombophlebitis.     cont  symbicort and spiriva, chloé  get PYP scan to R/O cardiac amyloid since she can now lie flat  - cont statin, Eliquis    GI ppx w PPI

## 2023-08-01 NOTE — PROGRESS NOTE ADULT - ASSESSMENT
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 Referred for Afib ablation with Dr. Roca.   Assessment  Hyperglycemias: Steroid induced, a1c 5.6%, s/p IV Methylprednisolone, eating meals, started on basal bolus insulin.   Had steroid induced hyperglycemias, glucose improving. steroids being tapered  Smoker: shortness of breath, on O2, started on steroids.   HTN/CHF: on antihypertensive medications, monitored, asymptomatic.      Discussed plan and management with Attending   Roger Woods NP - TEAMS  Dr Sebas Childers  [phone]

## 2023-08-01 NOTE — PROVIDER CONTACT NOTE (OTHER) - BACKGROUND
dx: AFib  hx: NSR 70s on tele, lasix gtt @ 5mL/hr
Pt was admitted [facility] dHF, AFIB, hyperglycemia, LE cellulitis, PNA, COPD
pt s/p afib ablation

## 2023-08-01 NOTE — PROGRESS NOTE ADULT - ASSESSMENT
64 y/o female with PMH of AVB s/p PPM [facility] 2018, afib on Eliquis, HTN, tobacco smoker current 0.5 ppd, and CHF, afib, s/p ablation on 7/26, who was admitted for acute CHF exacerbation    CHF exacerbation  Atrial fibrillation    - s/p afib ablation on 7/26, now in acute heart failure. Tele SR rates ~70-100s w/ brief 5 sec pAF yesterday. Pt asymptomatic.   -- Discussed pt is s/p ablation, and brief atrial arrhythmia can recur during blanking period.   - Diuresis per CHF team. Now on Lasix gtt 7.5mg. Appreciate cardiology/HF recs  -- Pt feeling improved  - Continue AC with Eliquis 5mg bid  - Continue Colchicine  - NM Amyloidosis scan is not suggestive of transthyretin amyloidosis.   - Cardiology considering ischemic eval when euvolemic, prefer noninvasive as pt is s/p recent ablation and on AC to minimize AC interruption.  - Has f/up appt with Dr Roca on 9/12/23 at 9:30am  - Continue telemetry monitoring. Keep K>4, Mg>2, please replete Potassium  - Discussed with EP attending and primary team.    64 y/o female with PMH of AVB s/p PPM [facility] 2018, afib on Eliquis, HTN, tobacco smoker current 0.5 ppd, and CHF, afib, s/p ablation on 7/26, who was admitted for acute CHF exacerbation    CHF exacerbation  Atrial fibrillation    - s/p afib ablation on 7/26, now in acute heart failure. Tele SR rates ~70-100s w/ brief 5 sec pAF yesterday. Pt asymptomatic.   -- Discussed pt is s/p ablation, and brief atrial arrhythmia can recur during blanking period.   - Diuresis per CHF team. Now on Lasix gtt 7.5mg. Appreciate cardiology/HF recs  -- Pt feeling improved  - Continue AC with Eliquis 5mg bid  - Continue Colchicine  - NM Amyloidosis scan is not suggestive of transthyretin amyloidosis.   - Cardiology considering ischemic eval when euvolemic, prefer noninvasive as pt is s/p recent ablation and on AC to minimize AC interruption. Per patient, she had recent ischemic eval (?cath) with her cardiologist this year.  - Has f/up appt with Dr Roca on 9/12/23 at 9:30am  - Continue telemetry monitoring. Keep K>4, Mg>2, please replete Potassium  - Discussed with EP attending and primary team.

## 2023-08-01 NOTE — PROGRESS NOTE ADULT - SUBJECTIVE AND OBJECTIVE BOX
24H hour events: Tele with SR rates ~70-100s, brief 5 sec pAF yesterday. Pt asymptomatic. Denies chest pain/palpitations/dizziness. SOB much improved per patient, able to ambulate without issues.     MEDICATIONS:  apixaban 5 milliGRAM(s) Oral every 12 hours  furosemide Infusion 7.5 mG/Hr IV Continuous <Continuous>  hydrALAZINE 10 milliGRAM(s) Oral three times a day  spironolactone 25 milliGRAM(s) Oral daily  albuterol/ipratropium for Nebulization 3 milliLiter(s) Nebulizer every 6 hours  budesonide 160 MICROgram(s)/formoterol 4.5 MICROgram(s) Inhaler 2 Puff(s) Inhalation two times a day  tiotropium 2.5 MICROgram(s) Inhaler 2 Puff(s) Inhalation daily  acetaminophen     Tablet .. 650 milliGRAM(s) Oral every 6 hours PRN  oxycodone    5 mG/acetaminophen 325 mG 1 Tablet(s) Oral every 6 hours PRN  pantoprazole    Tablet 40 milliGRAM(s) Oral before breakfast  atorvastatin 40 milliGRAM(s) Oral at bedtime  colchicine 0.6 milliGRAM(s) Oral daily  insulin glargine Injectable (LANTUS) 12 Unit(s) SubCutaneous at bedtime  insulin lispro (ADMELOG) corrective regimen sliding scale   SubCutaneous three times a day before meals  insulin lispro Injectable (ADMELOG) 4 Unit(s) SubCutaneous three times a day before meals  chlorhexidine 2% Cloths 1 Application(s) Topical <User Schedule>      REVIEW OF SYSTEMS:  Complete 12point ROS negative.    PHYSICAL EXAM:  T(C): 37 (08-01-23 @ 04:21), Max: 37 (08-01-23 @ 04:21)  HR: 95 (08-01-23 @ 04:21) (84 - 105)  BP: 116/72 (08-01-23 @ 04:21) (116/72 - 134/79)  RR: 18 (08-01-23 @ 04:21) (17 - 18)  SpO2: 95% (08-01-23 @ 04:21) (90% - 95%)  Wt(kg): --  I&O's Summary    31 Jul 2023 07:01  -  01 Aug 2023 07:00  --------------------------------------------------------  IN: 480 mL / OUT: 4200 mL / NET: -3720 mL        Appearance: Normal	  HEENT: NC/AT  Cardiovascular: Normal S1 S2, No JVD  Respiratory: crackles bilaterally	  Psychiatry: A & O x 3, Mood & affect appropriate	  Neurologic: Non-focal  Extremities: No BLE edema        LABS:	 	      08-01    136  |  87<L>  |  20  ----------------------------<  92  4.5   |  40<H>  |  0.58  07-31    137  |  89<L>  |  20  ----------------------------<  106<H>  4.1   |  40<H>  |  0.67    Ca    10.4      01 Aug 2023 06:41  Ca    10.1      31 Jul 2023 20:47  Phos  1.8     07-31  Phos  1.9     07-31  Mg     2.2     08-01  Mg     2.0     07-31        proBNP: Pro-Brain Natriuretic Peptide (07.28.23 @ 06:33)    Pro-Brain Natriuretic Peptide: 4216 pg/mL    TSH: Free Thyroxine, Serum in AM (07.29.23 @ 06:44)    Free Thyroxine, Serum: 1.1 ng/dL  	  RADIOLOGY: < from: Xray Chest 1 View- PORTABLE-Urgent (Xray Chest 1 View- PORTABLE-Urgent .) (07.29.23 @ 22:31) >  FINDINGS:The cardiac silhouette is enlarged. There is a left-sided   dual-lead pacemaker. There are bilateral pleural effusions and/or   bibasilar atelectasis, not significantly changed.    IMPRESSION: Bilateral pleural effusions and/or bibasilar atelectasis,  unchanged.    < end of copied text >      PREVIOUS DIAGNOSTIC TESTING:    [ ] Echocardiogram: < from: TTE W or WO Ultrasound Enhancing Agent (07.27.23 @ 08:52) >  CONCLUSIONS:      1. Normal left ventricular cavity size. The left ventricular wall thickness is normal. The left ventricular systolic function is normal with an ejection fraction of 63 % by Jones's method of disks. There are no regional wall motion abnormalities seen.   2. There is severe (grade 3) left ventricular diastolic dysfunction, with elevated filling pressure.   3. Severely enlarged right ventricular cavity size, normal right ventricular wall thickness and reduced systolic right ventricular function.The tricuspid annular plane systolic excursion (TAPSE) is 1.3 cm (normal >=1.7 cm).   4. The left atrium is severely dilated in size.   5. The right atrium is severely dilated in size.   6. Trace mitral regurgitation.   7. Estimated pulmonary artery systolic pressure is 54 mmHg.   8. No pericardial effusion seen.   9. Compared to the transesophageal echocardiogram performed on 7/26/2023 Patient has evidence of elevated left sided filling pressures with new septal flattening.    < end of copied text >    < from: NM Amyloidosis SPECT/CT, Single Area Single Day (07.31.23 @ 18:13) >    IMPRESSION: Cardiac amyloid Imaging study is  not suggestive of   transthyretin cardiac amyloidosis.    < end of copied text >

## 2023-08-01 NOTE — PROGRESS NOTE ADULT - SUBJECTIVE AND OBJECTIVE BOX
CARDIOLOGY FOLLOW UP - Dr. Johnson  DATE OF SERVICE: 8/1/23    CC  No CV complaints - Feeling better    REVIEW OF SYSTEMS:  CONSTITUTIONAL: No fever, weight loss, or fatigue  RESPIRATORY: No cough, wheezing, chills or hemoptysis; No Shortness of Breath  CARDIOVASCULAR: No chest pain, palpitations, passing out, dizziness, or leg swelling  GASTROINTESTINAL: No abdominal or epigastric pain. No nausea, vomiting, or hematemesis; No diarrhea or constipation. No melena or hematochezia.  VASCULAR: No edema     PHYSICAL EXAM:  T(C): 36.8 (08-01-23 @ 11:11), Max: 37 (08-01-23 @ 04:21)  HR: 100 (08-01-23 @ 11:11) (95 - 105)  BP: 114/63 (08-01-23 @ 11:11) (114/63 - 129/71)  RR: 18 (08-01-23 @ 14:25) (17 - 18)  SpO2: 94% (08-01-23 @ 14:25) (90% - 95%)  Wt(kg): --  I&O's Summary    31 Jul 2023 07:01  -  01 Aug 2023 07:00  --------------------------------------------------------  IN: 480 mL / OUT: 4200 mL / NET: -3720 mL    01 Aug 2023 07:01  -  01 Aug 2023 15:47  --------------------------------------------------------  IN: 440 mL / OUT: 1000 mL / NET: -560 mL        Appearance: Normal	  Cardiovascular: Normal S1 S2,RRR, No JVD, No murmurs  Respiratory: Minimal crackles at bases otherwise cta b/l  Gastrointestinal:  Soft, Non-tender, + BS	  Extremities: Normal range of motion, No clubbing, cyanosis. +Trace b/l le edema      Home Medications:  Eliquis 5 mg oral tablet: 1 orally 2 times a day (26 Jul 2023 11:39)  Lasix 40 mg oral tablet: 1 orally once a day (26 Jul 2023 11:42)  simvastatin 20 mg oral tablet: 1 orally once a day (26 Jul 2023 11:42)      MEDICATIONS  (STANDING):  albuterol/ipratropium for Nebulization 3 milliLiter(s) Nebulizer every 6 hours  apixaban 5 milliGRAM(s) Oral every 12 hours  atorvastatin 40 milliGRAM(s) Oral at bedtime  budesonide 160 MICROgram(s)/formoterol 4.5 MICROgram(s) Inhaler 2 Puff(s) Inhalation two times a day  chlorhexidine 2% Cloths 1 Application(s) Topical <User Schedule>  colchicine 0.6 milliGRAM(s) Oral daily  furosemide Infusion 7.5 mG/Hr (3.75 mL/Hr) IV Continuous <Continuous>  hydrALAZINE 10 milliGRAM(s) Oral three times a day  insulin glargine Injectable (LANTUS) 10 Unit(s) SubCutaneous at bedtime  insulin lispro (ADMELOG) corrective regimen sliding scale   SubCutaneous three times a day before meals  insulin lispro Injectable (ADMELOG) 4 Unit(s) SubCutaneous three times a day before meals  nicotine -   7 mG/24Hr(s) Patch 1 Patch Transdermal daily  pantoprazole    Tablet 40 milliGRAM(s) Oral before breakfast  spironolactone 25 milliGRAM(s) Oral daily  tiotropium 2.5 MICROgram(s) Inhaler 2 Puff(s) Inhalation daily      TELEMETRY: SR    ECG:  	  RADIOLOGY:   < from: NM Amyloidosis SPECT/CT, Single Area Single Day (07.31.23 @ 18:13) >    FINDINGS: The technical quality of the images was satisfactory.  There is   no abnormal myocardial uptake of radiopharmaceutical. Again noted are   bilateral basilar infiltratesand/or atelectasis.    Left Ventricular Myocardium to Bone (visual at 3 hrs): Grade: 0  (normal:   0)    Right Ventricular Myocardium to Bone (visual at 3 hrs): Grade: 0    (normal: 0)    IMPRESSION: Cardiac amyloid Imaging study is  not suggestive of   transthyretin cardiac amyloidosis.    --- End of Report ---    < end of copied text >    DIAGNOSTIC TESTING:  [ ] Echocardiogram:  [ ]  Catheterization:  [ ] Stress Test:    OTHER: 	    LABS:	 	        08-01    136  |  87<L>  |  20  ----------------------------<  92  4.5   |  40<H>  |  0.58    Ca    10.4      01 Aug 2023 06:41  Phos  1.8     07-31  Mg     2.2     08-01

## 2023-08-01 NOTE — PROVIDER CONTACT NOTE (OTHER) - ACTION/TREATMENT ORDERED:
Provider ordered stat ECG and labs.
Ramos to remain in patient. Patient to remain on bedrest.
STAT AM Labs

## 2023-08-01 NOTE — PROVIDER CONTACT NOTE (OTHER) - ASSESSMENT
-105 on tele, denied pain or discomfort.
13 bts AIVR for the first time.   /71  HR 65  O2 93% on 4LNC  T 98.3  pt at rest wihtout chest pain, SOB, or palpitations
VSS> Pt on 5LNC. HX of COPD. Gary Hernandez (NP) made aware. Right groin post procedural site clean dry and intact. No bleeding or hematoma noted. +pedal pulses via palpation. feet warm and mobile.

## 2023-08-01 NOTE — PROGRESS NOTE ADULT - ASSESSMENT
ECHO 7/27/23: EF 63%; nl LV sys fx,  no regional wall motion abnormalities seen. severe (grade 3) left ventricular diastolic dysfunction, with elevated filling pressure.Estimated pulmonary artery systolic pressure is 54 mmHg. The right atrium is severely dilated. The left atrium is severely dilated   ARSH 7/26/23:  No pericardial effusion seen. Moderate tricuspid regurgitation. Estimated pulmonary artery systolic pressure is 51 mmHg, consistent with moderate pulmonary hypertension. Left ventricular endocardium is not well visualized; however, the left ventricular systolic function appears grossly normal.  NM PET/CT Myocardial Sarcoidosis FDG (04.20.23 @ 13:19) >  OTHER STUDIES USED FOR CORRELATION: None  IMPRESSION: FDG PET/CT of the heart shows no evidence of FDG-avid   myocardial inflammation. Diffuse myocardial hypermetabolism seen on prior   study dated 4/6/2023, is not seen on the current study, and most likely   was due to inadequate dietary preparation.    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 Now referred for Afib Ablation.  she is now SP AF ablation and admitted with acute CHF     # Afib s/p afib ablation on 7/26  -EP f/u  -in nsr   -ac on eliquis     #acute on chronic Diastolic CHF   -s/p ablation, post acute CHF   -improving, vol status improved  -HF team f/u  -on lasix drip, responding well - continue for now   -alkalosis, low cl on bmp, cont to monitor   -Outpt PET/CT in April 2023 w/o evidence of Sarcoid   -TTE reveal Biatrial enlargement ?amyloid  -cardiac mri/amlyoid w/u per HF - no e/o amyloid  -reported CT cor wnl 12/2022  -consider repeat ischemic eval once vol status optimal   -le doppler neg for dvt     # AVB s/p PPM   -stable, ep f/u    #COPD  -pulm fu

## 2023-08-01 NOTE — PROGRESS NOTE ADULT - SUBJECTIVE AND OBJECTIVE BOX
Chief complaint  Patient is a 63y old  Female who presents with a chief complaint of ablation (31 Jul 2023 21:42)         Labs and Fingersticks  CAPILLARY BLOOD GLUCOSE      POCT Blood Glucose.: 102 mg/dL (01 Aug 2023 07:36)  POCT Blood Glucose.: 111 mg/dL (31 Jul 2023 21:03)  POCT Blood Glucose.: 104 mg/dL (31 Jul 2023 18:40)  POCT Blood Glucose.: 128 mg/dL (31 Jul 2023 11:51)      Anion Gap: 9 (08-01 @ 06:41)  Anion Gap: 8 (07-31 @ 20:47)  Anion Gap: 9 (07-31 @ 14:18)  Anion Gap: 13 (07-31 @ 06:17)      Calcium: 10.4 (08-01 @ 06:41)  Calcium: 10.1 (07-31 @ 20:47)  Calcium: 9.9 (07-31 @ 14:18)  Calcium: 10.4 (07-31 @ 06:17)          08-01    136  |  87<L>  |  20  ----------------------------<  92  4.5   |  40<H>  |  0.58    Ca    10.4      01 Aug 2023 06:41  Phos  1.8     07-31  Mg     2.2     08-01      Medications  MEDICATIONS  (STANDING):  albuterol/ipratropium for Nebulization 3 milliLiter(s) Nebulizer every 6 hours  apixaban 5 milliGRAM(s) Oral every 12 hours  atorvastatin 40 milliGRAM(s) Oral at bedtime  budesonide 160 MICROgram(s)/formoterol 4.5 MICROgram(s) Inhaler 2 Puff(s) Inhalation two times a day  chlorhexidine 2% Cloths 1 Application(s) Topical <User Schedule>  colchicine 0.6 milliGRAM(s) Oral daily  furosemide Infusion 7.5 mG/Hr (3.75 mL/Hr) IV Continuous <Continuous>  hydrALAZINE 10 milliGRAM(s) Oral three times a day  insulin glargine Injectable (LANTUS) 10 Unit(s) SubCutaneous at bedtime  insulin lispro (ADMELOG) corrective regimen sliding scale   SubCutaneous three times a day before meals  insulin lispro Injectable (ADMELOG) 4 Unit(s) SubCutaneous three times a day before meals  nicotine -   7 mG/24Hr(s) Patch 1 Patch Transdermal daily  pantoprazole    Tablet 40 milliGRAM(s) Oral before breakfast  spironolactone 25 milliGRAM(s) Oral daily  tiotropium 2.5 MICROgram(s) Inhaler 2 Puff(s) Inhalation daily      Physical Exam  General: Patient comfortable in bed  Vital Signs Last 12 Hrs  T(F): 98.6 (08-01-23 @ 04:21), Max: 98.6 (08-01-23 @ 04:21)  HR: 95 (08-01-23 @ 04:21) (95 - 95)  BP: 116/72 (08-01-23 @ 04:21) (116/72 - 116/72)  BP(mean): --  RR: 18 (08-01-23 @ 04:21) (18 - 18)  SpO2: 95% (08-01-23 @ 04:21) (95% - 95%)    CVS: S1S2   Respiratory: No wheezing, no crepitations  GI: Abdomen soft, bowel sounds positive  Musculoskeletal:  moves all extremities  : Voiding

## 2023-08-02 ENCOUNTER — TRANSCRIPTION ENCOUNTER (OUTPATIENT)
Age: 63
End: 2023-08-02

## 2023-08-02 PROBLEM — I50.23 ACUTE ON CHRONIC SYSTOLIC (CONGESTIVE) HEART FAILURE: Chronic | Status: ACTIVE | Noted: 2023-07-26

## 2023-08-02 PROBLEM — Z95.0 PRESENCE OF CARDIAC PACEMAKER: Chronic | Status: ACTIVE | Noted: 2023-07-26

## 2023-08-02 PROBLEM — I10 ESSENTIAL (PRIMARY) HYPERTENSION: Chronic | Status: ACTIVE | Noted: 2023-07-26

## 2023-08-02 PROBLEM — Z87.891 PERSONAL HISTORY OF NICOTINE DEPENDENCE: Chronic | Status: ACTIVE | Noted: 2023-07-26

## 2023-08-02 PROBLEM — I48.20 CHRONIC ATRIAL FIBRILLATION, UNSPECIFIED: Chronic | Status: ACTIVE | Noted: 2023-07-26

## 2023-08-02 LAB
ANION GAP SERPL CALC-SCNC: 14 MMOL/L — SIGNIFICANT CHANGE UP (ref 5–17)
BUN SERPL-MCNC: 22 MG/DL — SIGNIFICANT CHANGE UP (ref 7–23)
CALCIUM SERPL-MCNC: 10.8 MG/DL — HIGH (ref 8.4–10.5)
CHLORIDE SERPL-SCNC: 84 MMOL/L — LOW (ref 96–108)
CO2 SERPL-SCNC: 37 MMOL/L — HIGH (ref 22–31)
CREAT SERPL-MCNC: 0.56 MG/DL — SIGNIFICANT CHANGE UP (ref 0.5–1.3)
EGFR: 103 ML/MIN/1.73M2 — SIGNIFICANT CHANGE UP
GLUCOSE BLDC GLUCOMTR-MCNC: 111 MG/DL — HIGH (ref 70–99)
GLUCOSE BLDC GLUCOMTR-MCNC: 118 MG/DL — HIGH (ref 70–99)
GLUCOSE BLDC GLUCOMTR-MCNC: 125 MG/DL — HIGH (ref 70–99)
GLUCOSE BLDC GLUCOMTR-MCNC: 149 MG/DL — HIGH (ref 70–99)
GLUCOSE SERPL-MCNC: 99 MG/DL — SIGNIFICANT CHANGE UP (ref 70–99)
HCT VFR BLD CALC: 49.7 % — HIGH (ref 34.5–45)
HGB BLD-MCNC: 15.8 G/DL — HIGH (ref 11.5–15.5)
MCHC RBC-ENTMCNC: 29.7 PG — SIGNIFICANT CHANGE UP (ref 27–34)
MCHC RBC-ENTMCNC: 31.8 GM/DL — LOW (ref 32–36)
MCV RBC AUTO: 93.4 FL — SIGNIFICANT CHANGE UP (ref 80–100)
NRBC # BLD: 0 /100 WBCS — SIGNIFICANT CHANGE UP (ref 0–0)
PLATELET # BLD AUTO: 233 K/UL — SIGNIFICANT CHANGE UP (ref 150–400)
POTASSIUM SERPL-MCNC: 3.8 MMOL/L — SIGNIFICANT CHANGE UP (ref 3.5–5.3)
POTASSIUM SERPL-SCNC: 3.8 MMOL/L — SIGNIFICANT CHANGE UP (ref 3.5–5.3)
RBC # BLD: 5.32 M/UL — HIGH (ref 3.8–5.2)
RBC # FLD: 12.8 % — SIGNIFICANT CHANGE UP (ref 10.3–14.5)
SODIUM SERPL-SCNC: 135 MMOL/L — SIGNIFICANT CHANGE UP (ref 135–145)
WBC # BLD: 10.15 K/UL — SIGNIFICANT CHANGE UP (ref 3.8–10.5)
WBC # FLD AUTO: 10.15 K/UL — SIGNIFICANT CHANGE UP (ref 3.8–10.5)

## 2023-08-02 RX ORDER — BUMETANIDE 0.25 MG/ML
3 INJECTION INTRAMUSCULAR; INTRAVENOUS DAILY
Refills: 0 | Status: DISCONTINUED | OUTPATIENT
Start: 2023-08-02 | End: 2023-08-03

## 2023-08-02 RX ORDER — LOSARTAN POTASSIUM 100 MG/1
25 TABLET, FILM COATED ORAL DAILY
Refills: 0 | Status: DISCONTINUED | OUTPATIENT
Start: 2023-08-02 | End: 2023-08-03

## 2023-08-02 RX ADMIN — PANTOPRAZOLE SODIUM 40 MILLIGRAM(S): 20 TABLET, DELAYED RELEASE ORAL at 05:32

## 2023-08-02 RX ADMIN — ATORVASTATIN CALCIUM 40 MILLIGRAM(S): 80 TABLET, FILM COATED ORAL at 21:17

## 2023-08-02 RX ADMIN — Medication 4 UNIT(S): at 08:24

## 2023-08-02 RX ADMIN — BUDESONIDE AND FORMOTEROL FUMARATE DIHYDRATE 2 PUFF(S): 160; 4.5 AEROSOL RESPIRATORY (INHALATION) at 05:33

## 2023-08-02 RX ADMIN — Medication 0.6 MILLIGRAM(S): at 11:33

## 2023-08-02 RX ADMIN — Medication 1 PATCH: at 11:33

## 2023-08-02 RX ADMIN — Medication 1 PATCH: at 11:23

## 2023-08-02 RX ADMIN — Medication 10 MILLIGRAM(S): at 05:31

## 2023-08-02 RX ADMIN — Medication 3 MILLILITER(S): at 00:59

## 2023-08-02 RX ADMIN — SPIRONOLACTONE 25 MILLIGRAM(S): 25 TABLET, FILM COATED ORAL at 05:32

## 2023-08-02 RX ADMIN — Medication 1 PATCH: at 20:12

## 2023-08-02 RX ADMIN — Medication 1 PATCH: at 07:57

## 2023-08-02 RX ADMIN — TIOTROPIUM BROMIDE 2 PUFF(S): 18 CAPSULE ORAL; RESPIRATORY (INHALATION) at 11:33

## 2023-08-02 RX ADMIN — Medication 4 UNIT(S): at 11:54

## 2023-08-02 RX ADMIN — CHLORHEXIDINE GLUCONATE 1 APPLICATION(S): 213 SOLUTION TOPICAL at 05:33

## 2023-08-02 RX ADMIN — INSULIN GLARGINE 10 UNIT(S): 100 INJECTION, SOLUTION SUBCUTANEOUS at 21:18

## 2023-08-02 RX ADMIN — Medication 3 MILLILITER(S): at 11:33

## 2023-08-02 RX ADMIN — Medication 4 UNIT(S): at 17:35

## 2023-08-02 RX ADMIN — Medication 3 MILLILITER(S): at 05:33

## 2023-08-02 RX ADMIN — BUDESONIDE AND FORMOTEROL FUMARATE DIHYDRATE 2 PUFF(S): 160; 4.5 AEROSOL RESPIRATORY (INHALATION) at 17:35

## 2023-08-02 RX ADMIN — Medication 3 MILLILITER(S): at 17:35

## 2023-08-02 RX ADMIN — APIXABAN 5 MILLIGRAM(S): 2.5 TABLET, FILM COATED ORAL at 08:23

## 2023-08-02 RX ADMIN — BUMETANIDE 3 MILLIGRAM(S): 0.25 INJECTION INTRAMUSCULAR; INTRAVENOUS at 12:58

## 2023-08-02 RX ADMIN — APIXABAN 5 MILLIGRAM(S): 2.5 TABLET, FILM COATED ORAL at 21:17

## 2023-08-02 NOTE — PROGRESS NOTE ADULT - SUBJECTIVE AND OBJECTIVE BOX
24H hour events:     MEDICATIONS:  apixaban 5 milliGRAM(s) Oral every 12 hours  furosemide Infusion 7.5 mG/Hr IV Continuous <Continuous>  hydrALAZINE 10 milliGRAM(s) Oral three times a day  spironolactone 25 milliGRAM(s) Oral daily      albuterol/ipratropium for Nebulization 3 milliLiter(s) Nebulizer every 6 hours  budesonide 160 MICROgram(s)/formoterol 4.5 MICROgram(s) Inhaler 2 Puff(s) Inhalation two times a day  tiotropium 2.5 MICROgram(s) Inhaler 2 Puff(s) Inhalation daily    acetaminophen     Tablet .. 650 milliGRAM(s) Oral every 6 hours PRN  oxycodone    5 mG/acetaminophen 325 mG 1 Tablet(s) Oral every 6 hours PRN    pantoprazole    Tablet 40 milliGRAM(s) Oral before breakfast    atorvastatin 40 milliGRAM(s) Oral at bedtime  colchicine 0.6 milliGRAM(s) Oral daily  insulin glargine Injectable (LANTUS) 10 Unit(s) SubCutaneous at bedtime  insulin lispro (ADMELOG) corrective regimen sliding scale   SubCutaneous three times a day before meals  insulin lispro Injectable (ADMELOG) 4 Unit(s) SubCutaneous three times a day before meals    chlorhexidine 2% Cloths 1 Application(s) Topical <User Schedule>      REVIEW OF SYSTEMS:  Complete 12point ROS negative.    PHYSICAL EXAM:  T(C): 36.7 (08-02-23 @ 04:18), Max: 36.8 (08-01-23 @ 11:11)  HR: 82 (08-02-23 @ 04:18) (76 - 100)  BP: 112/67 (08-02-23 @ 04:18) (112/67 - 118/75)  RR: 18 (08-02-23 @ 08:01) (18 - 18)  SpO2: 92% (08-02-23 @ 08:01) (85% - 94%)  Wt(kg): --  I&O's Summary    01 Aug 2023 07:01  -  02 Aug 2023 07:00  --------------------------------------------------------  IN: 440 mL / OUT: 3700 mL / NET: -3260 mL        Appearance: Normal	  HEENT:   Normal oral mucosa, PERRL, EOMI	  Lymphatic: No lymphadenopathy  Cardiovascular: Normal S1 S2, No JVD, No murmurs, No edema  Respiratory: Lungs clear to auscultation	  Psychiatry: A & O x 3, Mood & affect appropriate  Gastrointestinal:  Soft, Non-tender, + BS	  Skin: No rashes, No ecchymoses, No cyanosis	  Neurologic: Non-focal  Extremities: Normal range of motion, No clubbing, cyanosis or edema  Vascular: Peripheral pulses palpable 2+ bilaterally        LABS:	 	    CBC Full  -  ( 02 Aug 2023 07:09 )  WBC Count : 10.15 K/uL  Hemoglobin : 15.8 g/dL  Hematocrit : 49.7 %  Platelet Count - Automated : 233 K/uL  Mean Cell Volume : 93.4 fl  Mean Cell Hemoglobin : 29.7 pg  Mean Cell Hemoglobin Concentration : 31.8 gm/dL  Auto Neutrophil # : x  Auto Lymphocyte # : x  Auto Monocyte # : x  Auto Eosinophil # : x  Auto Basophil # : x  Auto Neutrophil % : x  Auto Lymphocyte % : x  Auto Monocyte % : x  Auto Eosinophil % : x  Auto Basophil % : x    08-02    135  |  84<L>  |  22  ----------------------------<  99  3.8   |  37<H>  |  0.56  08-01    135  |  82<L>  |  23  ----------------------------<  122<H>  4.2   |  38<H>  |  0.68    Ca    10.8<H>      02 Aug 2023 07:09  Ca    11.3<H>      01 Aug 2023 17:54  Phos  1.8     07-31  Mg     2.0     08-01  Mg     2.2     08-01        proBNP:   Lipid Profile:   HgA1c:   TSH:       CARDIAC MARKERS:          TELEMETRY: 	    ECG:  	  RADIOLOGY:  OTHER: 	    PREVIOUS DIAGNOSTIC TESTING:    [ ] Echocardiogram:    [ ]  Catheterization:  [ ] Stress Test:  	  	  ASSESSMENT/PLAN: 	     24H hour events: Tele SR rates ~80-100s w/ intermittent PVCs. Pt feeling well, denies chest pain/palpitations/SOB.     MEDICATIONS:  apixaban 5 milliGRAM(s) Oral every 12 hours  furosemide Infusion 7.5 mG/Hr IV Continuous <Continuous>  hydrALAZINE 10 milliGRAM(s) Oral three times a day  spironolactone 25 milliGRAM(s) Oral daily  albuterol/ipratropium for Nebulization 3 milliLiter(s) Nebulizer every 6 hours  budesonide 160 MICROgram(s)/formoterol 4.5 MICROgram(s) Inhaler 2 Puff(s) Inhalation two times a day  tiotropium 2.5 MICROgram(s) Inhaler 2 Puff(s) Inhalation daily  acetaminophen     Tablet .. 650 milliGRAM(s) Oral every 6 hours PRN  oxycodone    5 mG/acetaminophen 325 mG 1 Tablet(s) Oral every 6 hours PRN  pantoprazole    Tablet 40 milliGRAM(s) Oral before breakfast  atorvastatin 40 milliGRAM(s) Oral at bedtime  colchicine 0.6 milliGRAM(s) Oral daily  insulin glargine Injectable (LANTUS) 10 Unit(s) SubCutaneous at bedtime  insulin lispro (ADMELOG) corrective regimen sliding scale   SubCutaneous three times a day before meals  insulin lispro Injectable (ADMELOG) 4 Unit(s) SubCutaneous three times a day before meals  chlorhexidine 2% Cloths 1 Application(s) Topical <User Schedule>      REVIEW OF SYSTEMS:  Complete 12point ROS negative.    PHYSICAL EXAM:  T(C): 36.7 (08-02-23 @ 04:18), Max: 36.8 (08-01-23 @ 11:11)  HR: 82 (08-02-23 @ 04:18) (76 - 100)  BP: 112/67 (08-02-23 @ 04:18) (112/67 - 118/75)  RR: 18 (08-02-23 @ 08:01) (18 - 18)  SpO2: 92% (08-02-23 @ 08:01) (85% - 94%)  Wt(kg): --  I&O's Summary    01 Aug 2023 07:01  -  02 Aug 2023 07:00  --------------------------------------------------------  IN: 440 mL / OUT: 3700 mL / NET: -3260 mL        Appearance: Normal	  HEENT: NC/AT  Cardiovascular: Normal S1 S2, No JVD  Respiratory: Lungs clear to auscultation	  Psychiatry: A & O x 3, Mood & affect appropriate	  Neurologic: Non-focal  Extremities: No BLE edema      LABS:	 	    CBC Full  -  ( 02 Aug 2023 07:09 )  WBC Count : 10.15 K/uL  Hemoglobin : 15.8 g/dL  Hematocrit : 49.7 %  Platelet Count - Automated : 233 K/uL  Mean Cell Volume : 93.4 fl  Mean Cell Hemoglobin : 29.7 pg  Mean Cell Hemoglobin Concentration : 31.8 gm/dL    08-02    135  |  84<L>  |  22  ----------------------------<  99  3.8   |  37<H>  |  0.56  08-01    135  |  82<L>  |  23  ----------------------------<  122<H>  4.2   |  38<H>  |  0.68    Ca    10.8<H>      02 Aug 2023 07:09  Ca    11.3<H>      01 Aug 2023 17:54  Phos  1.8     07-31  Mg     2.0     08-01  Mg     2.2     08-01    proBNP: Pro-Brain Natriuretic Peptide (07.28.23 @ 06:33)    Pro-Brain Natriuretic Peptide: 4216 pg/mL    TSH: Free Thyroxine, Serum in AM (07.29.23 @ 06:44)    Free Thyroxine, Serum: 1.1 ng/dL  	  RADIOLOGY: < from: Xray Chest 1 View- PORTABLE-Urgent (Xray Chest 1 View- PORTABLE-Urgent .) (07.29.23 @ 22:31) >  FINDINGS:The cardiac silhouette is enlarged. There is a left-sided   dual-lead pacemaker. There are bilateral pleural effusions and/or   bibasilar atelectasis, not significantly changed.    IMPRESSION: Bilateral pleural effusions and/or bibasilar atelectasis,  unchanged.    < end of copied text >      PREVIOUS DIAGNOSTIC TESTING:    [ ] Echocardiogram: < from: TTE W or WO Ultrasound Enhancing Agent (07.27.23 @ 08:52) >  CONCLUSIONS:      1. Normal left ventricular cavity size. The left ventricular wall thickness is normal. The left ventricular systolic function is normal with an ejection fraction of 63 % by Jones's method of disks. There are no regional wall motion abnormalities seen.   2. There is severe (grade 3) left ventricular diastolic dysfunction, with elevated filling pressure.   3. Severely enlarged right ventricular cavity size, normal right ventricular wall thickness and reduced systolic right ventricular function.The tricuspid annular plane systolic excursion (TAPSE) is 1.3 cm (normal >=1.7 cm).   4. The left atrium is severely dilated in size.   5. The right atrium is severely dilated in size.   6. Trace mitral regurgitation.   7. Estimated pulmonary artery systolic pressure is 54 mmHg.   8. No pericardial effusion seen.   9. Compared to the transesophageal echocardiogram performed on 7/26/2023 Patient has evidence of elevated left sided filling pressures with new septal flattening.    < end of copied text >    < from: NM Amyloidosis SPECT/CT, Single Area Single Day (07.31.23 @ 18:13) >    IMPRESSION: Cardiac amyloid Imaging study is  not suggestive of   transthyretin cardiac amyloidosis.    < end of copied text >

## 2023-08-02 NOTE — DISCHARGE NOTE NURSING/CASE MANAGEMENT/SOCIAL WORK - PATIENT PORTAL LINK FT
You can access the FollowMyHealth Patient Portal offered by [facility] by registering at the following website: [email or web address]. By joining [employer]’s FollowMyHealth portal, you will also be able to view your health information using other applications (apps) compatible with our system.

## 2023-08-02 NOTE — DISCHARGE NOTE NURSING/CASE MANAGEMENT/SOCIAL WORK - NSDCFUADDAPPT_GEN_ALL_CORE_FT
APPTS ARE READY TO BE MADE: [x ] YES    Best Family or Patient Contact (if needed):    Additional Information about above appointments (if needed):    1:   2:   3:     Other comments or requests:   Patient was previously scheduled with Dr. Hermilo Roca on 09/12/2023 9:30AM at [address] [place] [address].      Patient was provided with follow up request details for Dr. Mary Gallegos and was advised to call to schedule follow up within specified time frame.      Patient was scheduled with ABBIE Gonzalez on 08/04/2023 12:00PM at [address] [place] [address] through the provider's office.    Patient was scheduled with Dr. Marycarmen Luong on 08/08/2023 12:15pm at [address] [facility], [address], [place] [address].

## 2023-08-02 NOTE — PROGRESS NOTE ADULT - ASSESSMENT
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 Referred for Afib ablation with Dr. Roca.   Assessment  Hyperglycemias: Steroid induced, a1c 5.6%, s/p IV Methylprednisolone, eating meals, started on basal bolus insulin. Glucose starting to trend stable.  Expect fluctuations post prandially.   Had steroid induced hyperglycemias, glucose improving. Steroids being tapered  Smoker: shortness of breath, on O2, started on steroids.   HTN/CHF: on antihypertensive medications, monitored, asymptomatic.      Discussed plan and management with Attending   Roger Woods NP - TEAMS  Dr Sebas Childers  [phone]

## 2023-08-02 NOTE — PROGRESS NOTE ADULT - ASSESSMENT
ECHO 7/27/23: EF 63%; nl LV sys fx,  no regional wall motion abnormalities seen. severe (grade 3) left ventricular diastolic dysfunction, with elevated filling pressure.Estimated pulmonary artery systolic pressure is 54 mmHg. The right atrium is severely dilated. The left atrium is severely dilated   ARSH 7/26/23:  No pericardial effusion seen. Moderate tricuspid regurgitation. Estimated pulmonary artery systolic pressure is 51 mmHg, consistent with moderate pulmonary hypertension. Left ventricular endocardium is not well visualized; however, the left ventricular systolic function appears grossly normal.  NM PET/CT Myocardial Sarcoidosis FDG (04.20.23 @ 13:19) >  OTHER STUDIES USED FOR CORRELATION: None  IMPRESSION: FDG PET/CT of the heart shows no evidence of FDG-avid   myocardial inflammation. Diffuse myocardial hypermetabolism seen on prior   study dated 4/6/2023, is not seen on the current study, and most likely   was due to inadequate dietary preparation.    a/p  64y/o  female w PMH of AVB s/p PPM [facility] 2018, afib on Eliquis, HTN, tobacco smoker, CHF  presents with recent increasing fatigue , COATES and decrease exercise tolerance. Now referred for Afib Ablation.  she is now SP AF ablation and admitted with acute CHF     # Afib s/p afib ablation on 7/26  -EP f/u  -in nsr   -ac on eliquis     #acute on chronic Diastolic CHF   -s/p ablation, post acute CHF   -improving, vol status improved  -HF team f/u  -on lasix drip, responding well - can transition to po per HF  -alkalosis, low cl on bmp, cont to monitor   -Outpt PET/CT in April 2023 w/o evidence of Sarcoid   -TTE reveal Biatrial enlargement ?amyloid  -cardiac mri/amlyoid w/u per HF - no e/o amyloid  -le doppler neg for dvt   -reported CT cor wnl 12/2022  -Will hold off on ischemic eval for now given recent ablation and risk of interrupted a/c      # AVB s/p PPM   -stable, ep f/u    #COPD  -pulm fu

## 2023-08-02 NOTE — PHYSICAL THERAPY INITIAL EVALUATION ADULT - ADDITIONAL COMMENTS
Patient lives in private home with spouse and children, no steps to enter, 1 flight to bedroom but patient has been sleeping on main level on couch. Patient uses 2L O2 via NC at home, own a rolling walker but doesn't use it.

## 2023-08-02 NOTE — DISCHARGE NOTE NURSING/CASE MANAGEMENT/SOCIAL WORK - NSSCTYPOFSERV_GEN_ALL_CORE
02/09/23 0851   Readmission Assessment   Number of Days since last admission? 8-30 days   Previous Disposition Home with Family   Who is being Interviewed Patient   What was the patient's/caregiver's perception as to why they think they needed to return back to the hospital? Other (Comment)  (Planned Procedure: Cardiac Catheterization Procedure)   Did you visit your Primary Care Physician after you left the hospital, before you returned this time? Yes   Did you see a specialist, such as Cardiac, Pulmonary, Orthopedic Physician, etc. after you left the hospital? Yes   Who advised the patient to return to the hospital? Physician   Does the patient report anything that got in the way of taking their medications? No   In our efforts to provide the best possible care to you and others like you, can you think of anything that we could have done to help you after you left the hospital the first time, so that you might not have needed to return so soon?  Other (Comment)  (nothing) Registered Nurse will contact you to arrange initial visit.

## 2023-08-02 NOTE — DISCHARGE NOTE NURSING/CASE MANAGEMENT/SOCIAL WORK - NSDCPEPT PROEDHF_GEN_ALL_CORE
Medical records req from [facility].   Faxed ov notes from 4-25-14, 6-28-16, 4-17-17, 5-17-17, 7-26-18, 11-12-18, 10-9-1911-25-19 and fbw results from 11-25-19 & 6-28-16
Call primary care provider for follow up after discharge/Activities as tolerated/Low salt diet/Monitor weight daily/Report signs and symptoms to primary care provider

## 2023-08-02 NOTE — PROGRESS NOTE ADULT - SUBJECTIVE AND OBJECTIVE BOX
Patient is a 63y old  Female who presents with a chief complaint of ablation (31 Jul 2023 21:42)      SUBJECTIVE / OVERNIGHT EVENTS: ptn feels well, she is off IV diuretics, placed on po BUMEX by HF. she is very determined to never smoke cigs again and will make sure nobody around her smokes as well including her . she has a pcp near where she lives, she has managed care medicare. she cannot have multiple PCPs. her PCP will refer her to specialists here which is where she wants to F/U. she wants to see me in the opffice as well, she would need to get permission from [facility]  (STANDING):  albuterol/ipratropium for Nebulization 3 milliLiter(s) Nebulizer every 6 hours  apixaban 5 milliGRAM(s) Oral every 12 hours  atorvastatin 40 milliGRAM(s) Oral at bedtime  budesonide 160 MICROgram(s)/formoterol 4.5 MICROgram(s) Inhaler 2 Puff(s) Inhalation two times a day  buMETAnide 3 milliGRAM(s) Oral daily  chlorhexidine 2% Cloths 1 Application(s) Topical <User Schedule>  colchicine 0.6 milliGRAM(s) Oral daily  insulin glargine Injectable (LANTUS) 10 Unit(s) SubCutaneous at bedtime  insulin lispro (ADMELOG) corrective regimen sliding scale   SubCutaneous three times a day before meals  insulin lispro Injectable (ADMELOG) 4 Unit(s) SubCutaneous three times a day before meals  losartan 25 milliGRAM(s) Oral daily  nicotine -   7 mG/24Hr(s) Patch 1 Patch Transdermal daily  pantoprazole    Tablet 40 milliGRAM(s) Oral before breakfast  spironolactone 25 milliGRAM(s) Oral daily  tiotropium 2.5 MICROgram(s) Inhaler 2 Puff(s) Inhalation daily    MEDICATIONS  (PRN):  acetaminophen     Tablet .. 650 milliGRAM(s) Oral every 6 hours PRN Temp greater or equal to 38C (100.4F), Moderate Pain (4 - 6)  oxycodone    5 mG/acetaminophen 325 mG 1 Tablet(s) Oral every 6 hours PRN back pain      Vital Signs Last 24 Hrs  T(F): 97.8 (08-02-23 @ 12:01), Max: 98.2 (08-01-23 @ 20:20)  HR: 94 (08-02-23 @ 16:40) (82 - 109)  BP: 128/77 (08-02-23 @ 16:40) (111/65 - 128/77)  RR: 18 (08-02-23 @ 12:01) (18 - 18)  SpO2: 95% (08-02-23 @ 12:34) (85% - 95%)  Telemetry:   CAPILLARY BLOOD GLUCOSE      POCT Blood Glucose.: 125 mg/dL (02 Aug 2023 17:19)  POCT Blood Glucose.: 149 mg/dL (02 Aug 2023 11:36)  POCT Blood Glucose.: 118 mg/dL (02 Aug 2023 07:50)  POCT Blood Glucose.: 126 mg/dL (01 Aug 2023 21:13)    I&O's Summary    01 Aug 2023 07:01  -  02 Aug 2023 07:00  --------------------------------------------------------  IN: 440 mL / OUT: 3700 mL / NET: -3260 mL    02 Aug 2023 07:01  -  02 Aug 2023 19:25  --------------------------------------------------------  IN: 460 mL / OUT: 600 mL / NET: -140 mL        PHYSICAL EXAM:  GENERAL: NAD, well-developed  HEAD:  Atraumatic, Normocephalic  EYES: EOMI, PERRLA, conjunctiva and sclera clear  NECK: Supple, No JVD  CHEST/LUNG: Clear to auscultation bilaterally; No wheeze  HEART: Regular rate and rhythm; No murmurs, rubs, or gallops  ABDOMEN: Soft, Nontender, Nondistended; Bowel sounds present  EXTREMITIES:  2+ Peripheral Pulses, No clubbing, cyanosis, or edema  PSYCH: AAOx3  NEUROLOGY: non-focal  SKIN: No rashes or lesions    LABS:                        15.8   10.15 )-----------( 233      ( 02 Aug 2023 07:09 )             49.7     08-02    135  |  84<L>  |  22  ----------------------------<  99  3.8   |  37<H>  |  0.56    Ca    10.8<H>      02 Aug 2023 07:09  Phos  1.8     07-31  Mg     2.0     08-01            Urinalysis Basic - ( 02 Aug 2023 07:09 )    Color: x / Appearance: x / SG: x / pH: x  Gluc: 99 mg/dL / Ketone: x  / Bili: x / Urobili: x   Blood: x / Protein: x / Nitrite: x   Leuk Esterase: x / RBC: x / WBC x   Sq Epi: x / Non Sq Epi: x / Bacteria: x        RADIOLOGY & ADDITIONAL TESTS:    Imaging Personally Reviewed:    Consultant(s) Notes Reviewed:      Care Discussed with Consultants/Other Providers:

## 2023-08-02 NOTE — PROGRESS NOTE ADULT - ASSESSMENT
64 yo woman admitted for Afib ablation, post procedure developed diffuse rales and fluid overload.   ptn transferred to my service 2/2 acute hypoxic respiratory failure, systolic  R heart failure and diastolic L heart failure.. awaiting HF consult, s/p Afib ablation, now in sinus, on ELiquis. ptn is a chronic smoker, h/o PPM 2/2 AVB. ptn also c/o pleuritic CP " ever since i woke up from anesthesia" ptn states she is on home O2,, 2LNC. She is not on any COPD meds/inhalers, has a pulmonologist, smokes 1 PPD x50 yrs.  ptn is noted to have LE erythema, she has tend over LE    A/P  TTE c/w biventricular HF,   CTA chest: no PE, has bilateral extensive infiltrates. seen by ID, suggested to stop ABx, pulm abn on chest CT presumed to be atelectasis. doing well off ABx  LE dopplers neg for DVT  s/p  volume overload, now euvolemic, Lasix drip turned off, started on Aldactone and today on PO BUMEX  dyspnea much improved, able to lie flat  s/p pleuritic CP post ablation now resolved on colchicine  RHC/LHC to be arranged as per cardiology in 2 months post ablation  hyperglycemic 2/2 Steroids, now on prednisone taper, endo following  ptn also has varicose veins could contribute to erythema of LE 2/2 superficial thrombophlebitis.   diastolic HF: now off Hydralazine, on losartan  cont  symbicort and spiriva, chloé  get PYP scan to R/O cardiac amyloid since she can now lie flat  - cont statin, Eliquis  ptn is very determined to never smoke cigs again and will make sure nobody around her smokes as well including her .   she has a pcp near where she lives, she has managed care medicare. she cannot have multiple PCPs. her PCP will refer her to specialists here which is where she wants to F/U. she wants to see me in the office as well, she would need to get permission from [facility]   cont symbicort and chloé for COPD. will need outptn pulm F/U and outptn sleep study  GI ppx w PPI

## 2023-08-02 NOTE — PROGRESS NOTE ADULT - ASSESSMENT
64 y/o female with PMH of AVB s/p PPM [facility] 2018, afib on Eliquis, HTN, tobacco smoker current 0.5 ppd, and CHF, afib, s/p ablation on 7/26, who was admitted for acute CHF exacerbation    CHF exacerbation  Atrial fibrillation    - s/p afib ablation on 7/26, now in acute heart failure. Tele SR rates ~80-100ss w/ PVCs. Pt asymptomatic.   - Diuresis per CHF team. Now on Lasix gtt 7.5mg. Appreciate cardiology/HF recs  -- Pt feeling improved  - Continue AC with Eliquis 5mg bid  - Continue Colchicine  - NM Amyloidosis scan is not suggestive of transthyretin amyloidosis.   - Cardiology considering ischemic eval when euvolemic, prefer noninvasive as pt is s/p recent ablation and on AC to minimize AC interruption. Per patient, she had recent ischemic eval (?cath) with her cardiologist this year.  - Has f/up appt with Dr Roca on 9/12/23 at 9:30am  - Continue telemetry monitoring. Keep K>4, Mg>2  - Discussed with EP attending and primary team.

## 2023-08-02 NOTE — PROGRESS NOTE ADULT - SUBJECTIVE AND OBJECTIVE BOX
Chief complaint  Patient is a 63y old  Female who presents with a chief complaint of ablation (31 Jul 2023 21:42)         Labs and Fingersticks  CAPILLARY BLOOD GLUCOSE      POCT Blood Glucose.: 118 mg/dL (02 Aug 2023 07:50)  POCT Blood Glucose.: 126 mg/dL (01 Aug 2023 21:13)  POCT Blood Glucose.: 135 mg/dL (01 Aug 2023 17:09)  POCT Blood Glucose.: 125 mg/dL (01 Aug 2023 11:46)      Anion Gap: 14 (08-02 @ 07:09)  Anion Gap: 15 (08-01 @ 17:54)  Anion Gap: 9 (08-01 @ 06:41)  Anion Gap: 8 (07-31 @ 20:47)  Anion Gap: 9 (07-31 @ 14:18)      Calcium: 10.8 *H* (08-02 @ 07:09)  Calcium: 11.3 *H* (08-01 @ 17:54)  Calcium: 10.4 (08-01 @ 06:41)  Calcium: 10.1 (07-31 @ 20:47)  Calcium: 9.9 (07-31 @ 14:18)          08-02    135  |  84<L>  |  22  ----------------------------<  99  3.8   |  37<H>  |  0.56    Ca    10.8<H>      02 Aug 2023 07:09  Phos  1.8     07-31  Mg     2.0     08-01                          15.8   10.15 )-----------( 233      ( 02 Aug 2023 07:09 )             49.7     Medications  MEDICATIONS  (STANDING):  albuterol/ipratropium for Nebulization 3 milliLiter(s) Nebulizer every 6 hours  apixaban 5 milliGRAM(s) Oral every 12 hours  atorvastatin 40 milliGRAM(s) Oral at bedtime  budesonide 160 MICROgram(s)/formoterol 4.5 MICROgram(s) Inhaler 2 Puff(s) Inhalation two times a day  chlorhexidine 2% Cloths 1 Application(s) Topical <User Schedule>  colchicine 0.6 milliGRAM(s) Oral daily  furosemide Infusion 7.5 mG/Hr (3.75 mL/Hr) IV Continuous <Continuous>  hydrALAZINE 10 milliGRAM(s) Oral three times a day  insulin glargine Injectable (LANTUS) 10 Unit(s) SubCutaneous at bedtime  insulin lispro (ADMELOG) corrective regimen sliding scale   SubCutaneous three times a day before meals  insulin lispro Injectable (ADMELOG) 4 Unit(s) SubCutaneous three times a day before meals  nicotine -   7 mG/24Hr(s) Patch 1 Patch Transdermal daily  pantoprazole    Tablet 40 milliGRAM(s) Oral before breakfast  spironolactone 25 milliGRAM(s) Oral daily  tiotropium 2.5 MICROgram(s) Inhaler 2 Puff(s) Inhalation daily      Physical Exam  General: Patient comfortable in bed   Vital Signs Last 12 Hrs  T(F): 98 (08-02-23 @ 04:18), Max: 98 (08-02-23 @ 04:18)  HR: 82 (08-02-23 @ 04:18) (82 - 82)  BP: 112/67 (08-02-23 @ 04:18) (112/67 - 112/67)  BP(mean): --  RR: 18 (08-02-23 @ 04:18) (18 - 18)  SpO2: 94% (08-02-23 @ 04:18) (94% - 94%)    CVS: S1S2   Respiratory: No wheezing, no crepitations  GI: Abdomen soft, bowel sounds positive  Musculoskeletal:  moves all extremities  : Voiding

## 2023-08-02 NOTE — PHYSICAL THERAPY INITIAL EVALUATION ADULT - PERTINENT HX OF CURRENT PROBLEM, REHAB EVAL
64y/o  female w PMH of AVB s/p PPM [facility] 2018, afib on Eliquis, HTN, tobacco smoker, CHF  presents with recent increasing fatigue , COATES and decrease exercise tolerance. Now referred for Afib Ablation.  she is now SP AF ablation and admitted with acute CHF  7/27/23: Chest xray: Bilateral pleural effusions right greater than left with associated atelectasis. Cardiomegaly.  7/28/23: CT angio chest: Small bilateral pleural effusions. There is associated moderately extensive bibasilar infiltrate and/or subsegmental atelectasis. Subsegmental atelectasis within the posterior aspect of the left upper lobe. No evidence of pulmonary embolism where visualized.  LE dopplers: No evidence of deep venous thrombosis in either lower extremity.  7/29/23; Chest xray;  Bilateral pleural effusions and/or bibasilar atelectasis, unchanged.  7/31/23: NM Amyloidosis SPECT/CT: Cardiac amyloid Imaging study is  not suggestive of transthyretin cardiac amyloidosis.

## 2023-08-02 NOTE — PHYSICAL THERAPY INITIAL EVALUATION ADULT - GENERAL OBSERVATIONS, REHAB EVAL
Patient received sitting in bedside chair, NAD, VSS, +3L O2 via NC, +PIV infusing (lasix), +tele/pulse ox, +external female catheter

## 2023-08-02 NOTE — PROGRESS NOTE ADULT - SUBJECTIVE AND OBJECTIVE BOX
CARDIOLOGY FOLLOW UP - Dr. Johnson  DATE OF SERVICE: 8/2/23    CC  No CV complaints    REVIEW OF SYSTEMS:  CONSTITUTIONAL: No fever, weight loss, or fatigue  RESPIRATORY: No cough, wheezing, chills or hemoptysis; No Shortness of Breath  CARDIOVASCULAR: No chest pain, palpitations, passing out, dizziness, or leg swelling  GASTROINTESTINAL: No abdominal or epigastric pain. No nausea, vomiting, or hematemesis; No diarrhea or constipation. No melena or hematochezia.  VASCULAR: No edema     PHYSICAL EXAM:  T(C): 36.6 (08-02-23 @ 12:01), Max: 36.8 (08-01-23 @ 20:20)  HR: 109 (08-02-23 @ 12:34) (76 - 109)  BP: 111/71 (08-02-23 @ 12:34) (111/65 - 118/75)  RR: 18 (08-02-23 @ 12:01) (18 - 18)  SpO2: 95% (08-02-23 @ 12:34) (85% - 95%)  Wt(kg): --  I&O's Summary    01 Aug 2023 07:01  -  02 Aug 2023 07:00  --------------------------------------------------------  IN: 440 mL / OUT: 3700 mL / NET: -3260 mL    02 Aug 2023 07:01  -  02 Aug 2023 14:03  --------------------------------------------------------  IN: 460 mL / OUT: 0 mL / NET: 460 mL        Appearance: Normal	  Cardiovascular: Normal S1 S2,RRR, No JVD, No murmurs  Respiratory: Lungs clear to auscultation b/l	  Gastrointestinal:  Soft, Non-tender, + BS	  Extremities: Normal range of motion, No clubbing, cyanosis or edema      Home Medications:  Eliquis 5 mg oral tablet: 1 orally 2 times a day (26 Jul 2023 11:39)  Lasix 40 mg oral tablet: 1 orally once a day (26 Jul 2023 11:42)  simvastatin 20 mg oral tablet: 1 orally once a day (26 Jul 2023 11:42)      MEDICATIONS  (STANDING):  albuterol/ipratropium for Nebulization 3 milliLiter(s) Nebulizer every 6 hours  apixaban 5 milliGRAM(s) Oral every 12 hours  atorvastatin 40 milliGRAM(s) Oral at bedtime  budesonide 160 MICROgram(s)/formoterol 4.5 MICROgram(s) Inhaler 2 Puff(s) Inhalation two times a day  buMETAnide 3 milliGRAM(s) Oral daily  chlorhexidine 2% Cloths 1 Application(s) Topical <User Schedule>  colchicine 0.6 milliGRAM(s) Oral daily  insulin glargine Injectable (LANTUS) 10 Unit(s) SubCutaneous at bedtime  insulin lispro (ADMELOG) corrective regimen sliding scale   SubCutaneous three times a day before meals  insulin lispro Injectable (ADMELOG) 4 Unit(s) SubCutaneous three times a day before meals  losartan 25 milliGRAM(s) Oral daily  nicotine -   7 mG/24Hr(s) Patch 1 Patch Transdermal daily  pantoprazole    Tablet 40 milliGRAM(s) Oral before breakfast  spironolactone 25 milliGRAM(s) Oral daily  tiotropium 2.5 MICROgram(s) Inhaler 2 Puff(s) Inhalation daily      TELEMETRY: SR , pvcs	    ECG:  	  RADIOLOGY:   DIAGNOSTIC TESTING:  [ ] Echocardiogram:  [ ]  Catheterization:  [ ] Stress Test:    OTHER: 	    LABS:	 	                            15.8   10.15 )-----------( 233      ( 02 Aug 2023 07:09 )             49.7     08-02    135  |  84<L>  |  22  ----------------------------<  99  3.8   |  37<H>  |  0.56    Ca    10.8<H>      02 Aug 2023 07:09  Phos  1.8     07-31  Mg     2.0     08-01

## 2023-08-03 VITALS
DIASTOLIC BLOOD PRESSURE: 64 MMHG | OXYGEN SATURATION: 94 % | TEMPERATURE: 98 F | RESPIRATION RATE: 18 BRPM | SYSTOLIC BLOOD PRESSURE: 99 MMHG | HEART RATE: 76 BPM

## 2023-08-03 LAB
ANION GAP SERPL CALC-SCNC: 12 MMOL/L — SIGNIFICANT CHANGE UP (ref 5–17)
BUN SERPL-MCNC: 22 MG/DL — SIGNIFICANT CHANGE UP (ref 7–23)
CALCIUM SERPL-MCNC: 10.9 MG/DL — HIGH (ref 8.4–10.5)
CHLORIDE SERPL-SCNC: 84 MMOL/L — LOW (ref 96–108)
CO2 SERPL-SCNC: 37 MMOL/L — HIGH (ref 22–31)
CREAT SERPL-MCNC: 0.58 MG/DL — SIGNIFICANT CHANGE UP (ref 0.5–1.3)
EGFR: 102 ML/MIN/1.73M2 — SIGNIFICANT CHANGE UP
GLUCOSE BLDC GLUCOMTR-MCNC: 126 MG/DL — HIGH (ref 70–99)
GLUCOSE BLDC GLUCOMTR-MCNC: 127 MG/DL — HIGH (ref 70–99)
GLUCOSE SERPL-MCNC: 90 MG/DL — SIGNIFICANT CHANGE UP (ref 70–99)
HCT VFR BLD CALC: 50.6 % — HIGH (ref 34.5–45)
HGB BLD-MCNC: 15.9 G/DL — HIGH (ref 11.5–15.5)
MCHC RBC-ENTMCNC: 29.8 PG — SIGNIFICANT CHANGE UP (ref 27–34)
MCHC RBC-ENTMCNC: 31.4 GM/DL — LOW (ref 32–36)
MCV RBC AUTO: 94.8 FL — SIGNIFICANT CHANGE UP (ref 80–100)
NRBC # BLD: 0 /100 WBCS — SIGNIFICANT CHANGE UP (ref 0–0)
PLATELET # BLD AUTO: 207 K/UL — SIGNIFICANT CHANGE UP (ref 150–400)
POTASSIUM SERPL-MCNC: 3.6 MMOL/L — SIGNIFICANT CHANGE UP (ref 3.5–5.3)
POTASSIUM SERPL-SCNC: 3.6 MMOL/L — SIGNIFICANT CHANGE UP (ref 3.5–5.3)
RBC # BLD: 5.34 M/UL — HIGH (ref 3.8–5.2)
RBC # FLD: 12.7 % — SIGNIFICANT CHANGE UP (ref 10.3–14.5)
SODIUM SERPL-SCNC: 133 MMOL/L — LOW (ref 135–145)
URATE SERPL-MCNC: 6.2 MG/DL — SIGNIFICANT CHANGE UP (ref 2.5–7)
WBC # BLD: 10.91 K/UL — HIGH (ref 3.8–10.5)
WBC # FLD AUTO: 10.91 K/UL — HIGH (ref 3.8–10.5)

## 2023-08-03 PROCEDURE — 84550 ASSAY OF BLOOD/URIC ACID: CPT

## 2023-08-03 PROCEDURE — 80053 COMPREHEN METABOLIC PANEL: CPT

## 2023-08-03 PROCEDURE — 87641 MR-STAPH DNA AMP PROBE: CPT

## 2023-08-03 PROCEDURE — 84436 ASSAY OF TOTAL THYROXINE: CPT

## 2023-08-03 PROCEDURE — 93005 ELECTROCARDIOGRAM TRACING: CPT

## 2023-08-03 PROCEDURE — 86850 RBC ANTIBODY SCREEN: CPT

## 2023-08-03 PROCEDURE — 83521 IG LIGHT CHAINS FREE EACH: CPT

## 2023-08-03 PROCEDURE — 87640 STAPH A DNA AMP PROBE: CPT

## 2023-08-03 PROCEDURE — 83735 ASSAY OF MAGNESIUM: CPT

## 2023-08-03 PROCEDURE — 84480 ASSAY TRIIODOTHYRONINE (T3): CPT

## 2023-08-03 PROCEDURE — 93655 ICAR CATH ABLTJ DSCRT ARRHYT: CPT

## 2023-08-03 PROCEDURE — 78830 RP LOCLZJ TUM SPECT W/CT 1: CPT

## 2023-08-03 PROCEDURE — 80048 BASIC METABOLIC PNL TOTAL CA: CPT

## 2023-08-03 PROCEDURE — 82248 BILIRUBIN DIRECT: CPT

## 2023-08-03 PROCEDURE — 82962 GLUCOSE BLOOD TEST: CPT

## 2023-08-03 PROCEDURE — 86901 BLOOD TYPING SEROLOGIC RH(D): CPT

## 2023-08-03 PROCEDURE — C8929: CPT

## 2023-08-03 PROCEDURE — C9399: CPT

## 2023-08-03 PROCEDURE — 93653 COMPRE EP EVAL TX SVT: CPT

## 2023-08-03 PROCEDURE — 93970 EXTREMITY STUDY: CPT

## 2023-08-03 PROCEDURE — 83880 ASSAY OF NATRIURETIC PEPTIDE: CPT

## 2023-08-03 PROCEDURE — A9538: CPT

## 2023-08-03 PROCEDURE — 71275 CT ANGIOGRAPHY CHEST: CPT

## 2023-08-03 PROCEDURE — 85027 COMPLETE CBC AUTOMATED: CPT

## 2023-08-03 PROCEDURE — 93312 ECHO TRANSESOPHAGEAL: CPT

## 2023-08-03 PROCEDURE — 97161 PT EVAL LOW COMPLEX 20 MIN: CPT

## 2023-08-03 PROCEDURE — 36415 COLL VENOUS BLD VENIPUNCTURE: CPT

## 2023-08-03 PROCEDURE — 84156 ASSAY OF PROTEIN URINE: CPT

## 2023-08-03 PROCEDURE — 86900 BLOOD TYPING SEROLOGIC ABO: CPT

## 2023-08-03 PROCEDURE — 87449 NOS EACH ORGANISM AG IA: CPT

## 2023-08-03 PROCEDURE — 93320 DOPPLER ECHO COMPLETE: CPT

## 2023-08-03 PROCEDURE — 71045 X-RAY EXAM CHEST 1 VIEW: CPT

## 2023-08-03 PROCEDURE — 93325 DOPPLER ECHO COLOR FLOW MAPG: CPT

## 2023-08-03 PROCEDURE — 84439 ASSAY OF FREE THYROXINE: CPT

## 2023-08-03 PROCEDURE — 94640 AIRWAY INHALATION TREATMENT: CPT

## 2023-08-03 PROCEDURE — 83036 HEMOGLOBIN GLYCOSYLATED A1C: CPT

## 2023-08-03 PROCEDURE — 84443 ASSAY THYROID STIM HORMONE: CPT

## 2023-08-03 PROCEDURE — 84100 ASSAY OF PHOSPHORUS: CPT

## 2023-08-03 PROCEDURE — 86335 IMMUNFIX E-PHORSIS/URINE/CSF: CPT

## 2023-08-03 PROCEDURE — 84145 PROCALCITONIN (PCT): CPT

## 2023-08-03 PROCEDURE — 86334 IMMUNOFIX E-PHORESIS SERUM: CPT

## 2023-08-03 RX ORDER — TIOTROPIUM BROMIDE 18 UG/1
2 CAPSULE ORAL; RESPIRATORY (INHALATION)
Qty: 1 | Refills: 0
Start: 2023-08-03 | End: 2023-09-01

## 2023-08-03 RX ORDER — PANTOPRAZOLE SODIUM 20 MG/1
1 TABLET, DELAYED RELEASE ORAL
Qty: 30 | Refills: 0
Start: 2023-08-03 | End: 2023-09-01

## 2023-08-03 RX ORDER — NICOTINE POLACRILEX 2 MG
1 GUM BUCCAL
Qty: 1 | Refills: 0
Start: 2023-08-03 | End: 2023-09-01

## 2023-08-03 RX ORDER — POTASSIUM CHLORIDE 20 MEQ
20 PACKET (EA) ORAL DAILY
Refills: 0 | Status: DISCONTINUED | OUTPATIENT
Start: 2023-08-03 | End: 2023-08-03

## 2023-08-03 RX ORDER — POTASSIUM CHLORIDE 20 MEQ
1 PACKET (EA) ORAL
Qty: 30 | Refills: 0
Start: 2023-08-03 | End: 2023-09-01

## 2023-08-03 RX ORDER — LOSARTAN POTASSIUM 100 MG/1
1 TABLET, FILM COATED ORAL
Qty: 30 | Refills: 0
Start: 2023-08-03 | End: 2023-09-01

## 2023-08-03 RX ORDER — BUDESONIDE AND FORMOTEROL FUMARATE DIHYDRATE 160; 4.5 UG/1; UG/1
2 AEROSOL RESPIRATORY (INHALATION)
Qty: 1 | Refills: 0
Start: 2023-08-03 | End: 2023-09-01

## 2023-08-03 RX ORDER — BUMETANIDE 0.25 MG/ML
2 INJECTION INTRAMUSCULAR; INTRAVENOUS
Qty: 60 | Refills: 0
Start: 2023-08-03 | End: 2023-09-01

## 2023-08-03 RX ORDER — IPRATROPIUM/ALBUTEROL SULFATE 18-103MCG
3 AEROSOL WITH ADAPTER (GRAM) INHALATION
Qty: 360 | Refills: 0
Start: 2023-08-03 | End: 2023-09-01

## 2023-08-03 RX ORDER — SPIRONOLACTONE 25 MG/1
1 TABLET, FILM COATED ORAL
Qty: 30 | Refills: 0
Start: 2023-08-03 | End: 2023-09-01

## 2023-08-03 RX ORDER — FUROSEMIDE 40 MG
1 TABLET ORAL
Refills: 0 | DISCHARGE

## 2023-08-03 RX ADMIN — Medication 3 MILLILITER(S): at 05:09

## 2023-08-03 RX ADMIN — LOSARTAN POTASSIUM 25 MILLIGRAM(S): 100 TABLET, FILM COATED ORAL at 05:09

## 2023-08-03 RX ADMIN — Medication 1 PATCH: at 11:55

## 2023-08-03 RX ADMIN — Medication 4 UNIT(S): at 07:48

## 2023-08-03 RX ADMIN — BUDESONIDE AND FORMOTEROL FUMARATE DIHYDRATE 2 PUFF(S): 160; 4.5 AEROSOL RESPIRATORY (INHALATION) at 05:09

## 2023-08-03 RX ADMIN — Medication 1 PATCH: at 11:50

## 2023-08-03 RX ADMIN — Medication 1 PATCH: at 11:57

## 2023-08-03 RX ADMIN — SPIRONOLACTONE 25 MILLIGRAM(S): 25 TABLET, FILM COATED ORAL at 05:09

## 2023-08-03 RX ADMIN — Medication 3 MILLILITER(S): at 00:27

## 2023-08-03 RX ADMIN — BUMETANIDE 3 MILLIGRAM(S): 0.25 INJECTION INTRAMUSCULAR; INTRAVENOUS at 05:09

## 2023-08-03 RX ADMIN — Medication 20 MILLIEQUIVALENT(S): at 15:13

## 2023-08-03 RX ADMIN — TIOTROPIUM BROMIDE 2 PUFF(S): 18 CAPSULE ORAL; RESPIRATORY (INHALATION) at 15:13

## 2023-08-03 RX ADMIN — PANTOPRAZOLE SODIUM 40 MILLIGRAM(S): 20 TABLET, DELAYED RELEASE ORAL at 05:09

## 2023-08-03 RX ADMIN — APIXABAN 5 MILLIGRAM(S): 2.5 TABLET, FILM COATED ORAL at 10:00

## 2023-08-03 RX ADMIN — Medication 3 MILLILITER(S): at 11:58

## 2023-08-03 RX ADMIN — CHLORHEXIDINE GLUCONATE 1 APPLICATION(S): 213 SOLUTION TOPICAL at 11:53

## 2023-08-03 NOTE — PROGRESS NOTE ADULT - PROVIDER SPECIALTY LIST ADULT
Electrophysiology
Electrophysiology
Endocrinology
Internal Medicine
Pulmonology
Cardiology
Cardiology
Electrophysiology
Internal Medicine
Cardiology
Electrophysiology
Internal Medicine
Heart Failure
Cardiology
Endocrinology

## 2023-08-03 NOTE — PROGRESS NOTE ADULT - TIME BILLING
Patient seen and examined.  Agree with above PA note.  cv stable and improving   vol status improved  oral diuretics per CHF with iv diuretic drip  med/eps/hf f/u  defer ischemic eval for now
Patient seen and examined.  Agree with above PA note.  cv stable and clinically improved  cont diuretics, lv dysfxn meds as ordered  eps f/u  hf f/u  dcp
Patient seen and examined.  Agree with above PA note.  cv stable and improving   vol status improving with iv diuretic drip  still hypervolemic   cont iv diuresis  med/eps/hf f/u  eventual ischemic eval once optimized, euvolemic
Patient seen and examined.  Agree with above PA note.  cv stable and improving   vol status improving with iv diuretic drip  still hypervolemic   cont iv diuresis  med/eps/hf f/u  eventual ischemic eval once optimized, euvolemic
Review of medical record,coordination of care and did not include time spent teaching.

## 2023-08-03 NOTE — PROGRESS NOTE ADULT - ASSESSMENT
62 yo woman admitted for Afib ablation, post procedure developed diffuse rales and fluid overload.   ptn transferred to my service 2/2 acute hypoxic respiratory failure, systolic  R heart failure and diastolic L heart failure.. awaiting HF consult, s/p Afib ablation, now in sinus, on ELiquis. ptn is a chronic smoker, h/o PPM 2/2 AVB. ptn also c/o pleuritic CP " ever since i woke up from anesthesia" ptn states she is on home O2,, 2LNC. She is not on any COPD meds/inhalers, has a pulmonologist, smokes 1 PPD x50 yrs.  ptn is noted to have LE erythema, she has tend over LE    A/P  TTE c/w biventricular HF,   CTA chest: no PE, has bilateral extensive infiltrates. seen by ID, suggested to stop ABx, pulm abn on chest CT presumed to be atelectasis. doing well off ABx  LE dopplers neg for DVT  s/p  volume overload, now euvolemic, Lasix drip turned off, started on Aldactone and on 8/2 on PO BUMEX  dyspnea much improved, able to lie flat  s/p pleuritic CP post ablation now resolved on colchicine  RHC/LHC to be arranged as per cardiology in 2 months post ablation  hyperglycemic 2/2 Steroids, now on prednisone taper, endo following  ptn also has varicose veins could contribute to erythema of LE 2/2 superficial thrombophlebitis.   diastolic HF: now off Hydralazine, on losartan  cont  symbicort and spiriva, chloé  get PYP scan to R/O cardiac amyloid since she can now lie flat  - cont statin, Eliquis  ptn is very determined to never smoke cigs again and will make sure nobody around her smokes as well including her .   she has a pcp near where she lives, she has managed care medicare. she cannot have multiple PCPs. her PCP will refer her to specialists here which is where she wants to F/U. she wants to see me in the office as well, she would need to get permission from [facility]   cont symbicort and toooneblanka for COPD. will need outptn pulm F/U and outptn sleep study  GI ppx w PPI

## 2023-08-03 NOTE — PROGRESS NOTE ADULT - PROBLEM SELECTOR PLAN 1
Will continue current insulin regimen for now.   Will continue monitoring FS, log, and glucose trends, will Follow up.  Patient counseled for compliance with consistent low carb diet and exercise as tolerated outpatient.
Will increase Lantus to 15 u at bedtime.  Will continue Admelog to 4 u before each meal and continue Admelog correction scale coverage. Will continue monitoring FS and Follow up.   Patient counseled for compliance with consistent low carb diet and exercise as tolerated outpatient.
Will continue Lantus to 10 u at bedtime.  Will continue Admelog to 4 u before each meal and continue Admelog correction scale coverage. Will continue monitoring FS and Follow up.   Patient counseled for compliance with consistent low carb diet and exercise as tolerated outpatient.  On steroid taper, insulin requirements decreasing, can get discharged on low carb diet  Rpt a1c in 3 months.
Will continue Lantus to 10 u at bedtime.  Will continue Admelog to 4 u before each meal and continue Admelog correction scale coverage. Will continue monitoring FS and Follow up.   Patient counseled for compliance with consistent low carb diet and exercise as tolerated outpatient.  On steroid taper, insulin requirements decreasing, can get discharged on low carb diet  Rpt a1c in 3 months.
Will continue Lantus to 10 u at bedtime.  Will continue Admelog to 4 u before each meal and continue Admelog correction scale coverage. Will continue monitoring FS and Follow up.   Patient counseled for compliance with consistent low carb diet and exercise as tolerated outpatient.  On steroid taper, insulin requirements decreasing, can get discharged on low carb diet  Rpt a1c in 3 months. Outpatient follow up
Serial EKG  Telemetry monitoring  Continue Eliquis
decrease lasix gtt to 7.5 mg/hr  aggressive repletion of K with KCl to 4.5-5  start bhavin 25 mg daily  K/L ratio normal  Tc-Pyp pending  given EF >50% CRT not indicated despite 48% V pacing
Will continue Lantus to 12 u at bedtime.  Will continue Admelog to 4 u before each meal and continue Admelog correction scale coverage. Will continue monitoring FS and Follow up.   Patient counseled for compliance with consistent low carb diet and exercise as tolerated outpatient.

## 2023-08-03 NOTE — PROGRESS NOTE ADULT - ASSESSMENT
64 y/o female with PMH of AVB s/p PPM [facility] 2018, afib on Eliquis, HTN, tobacco smoker current 0.5 PPD, and CHF, s/p AF ablation on 7/26, who was admitted for acute CHF exacerbation.    1. CHF exacerbation  2. Atrial fibrillation    - s/p afib ablation on 7/26, now in acute heart failure.   - Tele AF with VHR 's, occasional VPCs  - No plan for rhythm control given TTE with biatrial enlargement with intensive scar.   - Diuresis per CHF team. Now on oral Bumex. Appreciate cardiology/HF recs  - Pt feeling improved  - Continue AC with Eliquis 5mg bid  - NM Amyloidosis scan is not suggestive of transthyretin amyloidosis.   - OP PET per HF  - Cardiology considering ischemic eval when euvolemic, prefer noninvasive as pt is s/p recent ablation and on AC to minimize AC interruption. Per patient, she had recent ischemic eval (?cath) with her cardiologist this year.  - Has f/up appt with Dr Roca on 9/12/23 at 9:30am  - Discharge planning per primary team when HF is optimized   - Discussed with EP attending and Medicine ACP.     HAROON Ambrocio NP-C  [phone]

## 2023-08-03 NOTE — PROGRESS NOTE ADULT - ASSESSMENT
ECHO 7/27/23: EF 63%; nl LV sys fx,  no regional wall motion abnormalities seen. severe (grade 3) left ventricular diastolic dysfunction, with elevated filling pressure.Estimated pulmonary artery systolic pressure is 54 mmHg. The right atrium is severely dilated. The left atrium is severely dilated   ARSH 7/26/23:  No pericardial effusion seen. Moderate tricuspid regurgitation. Estimated pulmonary artery systolic pressure is 51 mmHg, consistent with moderate pulmonary hypertension. Left ventricular endocardium is not well visualized; however, the left ventricular systolic function appears grossly normal.  NM PET/CT Myocardial Sarcoidosis FDG (04.20.23 @ 13:19) >  OTHER STUDIES USED FOR CORRELATION: None  IMPRESSION: FDG PET/CT of the heart shows no evidence of FDG-avid   myocardial inflammation. Diffuse myocardial hypermetabolism seen on prior   study dated 4/6/2023, is not seen on the current study, and most likely   was due to inadequate dietary preparation.    a/p  62y/o  female w PMH of AVB s/p PPM [facility] 2018, afib on Eliquis, HTN, tobacco smoker, CHF  presents with recent increasing fatigue , COATES and decrease exercise tolerance. Now referred for Afib Ablation.  she is now SP AF ablation and admitted with acute CHF     # Afib s/p afib ablation on 7/26  -EP f/u  -in nsr   -ac on eliquis     #acute on chronic Diastolic CHF   -s/p ablation, post acute CHF   -improving, vol status improved  -HF team f/u  -S/p lasix gtt  -Continue po bumex  -alkalosis, low cl on bmp, cont to monitor   -Outpt PET/CT in April 2023 w/o evidence of Sarcoid   -TTE reveal Biatrial enlargement ?amyloid  -cardiac mri/amlyoid w/u per HF - no e/o amyloid  -le doppler neg for dvt   -reported CT cor wnl 12/2022  -Will hold off on ischemic eval for now given recent ablation and risk of interrupted a/c  -Continue aldactone, losartan    # AVB s/p PPM   -stable, ep f/u    #COPD  -pulm fu       dcp

## 2023-08-03 NOTE — PROGRESS NOTE ADULT - SUBJECTIVE AND OBJECTIVE BOX
CARDIOLOGY FOLLOW UP - Dr. Johnson  DATE OF SERVICE: 8/3/23    CC  No CV complaints    REVIEW OF SYSTEMS:  CONSTITUTIONAL: No fever, weight loss, or fatigue  RESPIRATORY: No cough, wheezing, chills or hemoptysis; No Shortness of Breath  CARDIOVASCULAR: No chest pain, palpitations, passing out, dizziness, or leg swelling  GASTROINTESTINAL: No abdominal or epigastric pain. No nausea, vomiting, or hematemesis; No diarrhea or constipation. No melena or hematochezia.  VASCULAR: No edema     PHYSICAL EXAM:  T(C): 36.4 (08-03-23 @ 11:16), Max: 36.6 (08-02-23 @ 12:01)  HR: 65 (08-03-23 @ 11:16) (65 - 109)  BP: 117/71 (08-03-23 @ 04:34) (111/65 - 128/77)  RR: 18 (08-03-23 @ 11:16) (18 - 18)  SpO2: 94% (08-03-23 @ 11:16) (94% - 95%)  Wt(kg): --  I&O's Summary    02 Aug 2023 07:01  -  03 Aug 2023 07:00  --------------------------------------------------------  IN: 460 mL / OUT: 600 mL / NET: -140 mL    03 Aug 2023 07:01  -  03 Aug 2023 11:37  --------------------------------------------------------  IN: 240 mL / OUT: 0 mL / NET: 240 mL        Appearance: Normal	  Cardiovascular: Normal S1 S2,RRR, No JVD, No murmurs  Respiratory: Lungs clear to auscultation b/l  Gastrointestinal:  Soft, Non-tender, + BS	  Extremities: Normal range of motion, No clubbing, cyanosis or edema      Home Medications:  Eliquis 5 mg oral tablet: 1 orally 2 times a day (26 Jul 2023 11:39)  Lasix 40 mg oral tablet: 1 orally once a day (26 Jul 2023 11:42)  simvastatin 20 mg oral tablet: 1 orally once a day (26 Jul 2023 11:42)      MEDICATIONS  (STANDING):  albuterol/ipratropium for Nebulization 3 milliLiter(s) Nebulizer every 6 hours  apixaban 5 milliGRAM(s) Oral every 12 hours  atorvastatin 40 milliGRAM(s) Oral at bedtime  budesonide 160 MICROgram(s)/formoterol 4.5 MICROgram(s) Inhaler 2 Puff(s) Inhalation two times a day  buMETAnide 3 milliGRAM(s) Oral daily  chlorhexidine 2% Cloths 1 Application(s) Topical <User Schedule>  insulin glargine Injectable (LANTUS) 10 Unit(s) SubCutaneous at bedtime  insulin lispro (ADMELOG) corrective regimen sliding scale   SubCutaneous three times a day before meals  insulin lispro Injectable (ADMELOG) 4 Unit(s) SubCutaneous three times a day before meals  losartan 25 milliGRAM(s) Oral daily  nicotine -   7 mG/24Hr(s) Patch 1 Patch Transdermal daily  pantoprazole    Tablet 40 milliGRAM(s) Oral before breakfast  spironolactone 25 milliGRAM(s) Oral daily  tiotropium 2.5 MICROgram(s) Inhaler 2 Puff(s) Inhalation daily      TELEMETRY: Afib/gurpreetced  	    ECG:  	  RADIOLOGY:   DIAGNOSTIC TESTING:  [ ] Echocardiogram:  [ ]  Catheterization:  [ ] Stress Test:    OTHER: 	    LABS:	 	                            15.9   10.91 )-----------( 207      ( 03 Aug 2023 07:10 )             50.6     08-03    133<L>  |  84<L>  |  22  ----------------------------<  90  3.6   |  37<H>  |  0.58    Ca    10.9<H>      03 Aug 2023 07:10  Mg     2.0     08-01

## 2023-08-03 NOTE — PROGRESS NOTE ADULT - ASSESSMENT
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 Referred for Afib ablation with Dr. Roca.   Assessment  Hyperglycemias: Steroid induced, a1c 5.6%, s/p IV Methylprednisolone, eating meals, started on basal bolus insulin. Glucose trending stable  Expect fluctuations post prandially.   Had steroid induced hyperglycemias, glucose improving. Steroids being tapered  Smoker: shortness of breath, on O2, started on steroids.   HTN/CHF: on antihypertensive medications, monitored, asymptomatic.      Discussed plan and management with Attending   Roger Woods NP - TEAMS  Dr Sebas Childers  [phone]

## 2023-08-03 NOTE — PROGRESS NOTE ADULT - NS ATTEND AMEND GEN_ALL_CORE FT
64 y/o female with PMH of AVB s/p PPM [facility] 2018, afib on Eliquis, HTN, tobacco smoker current 0.5 ppd, and CHF, afib, s/p ablation on 7/26, who was admitted for acute CHF exacerbation. Cont diuresis and AC. Will follow.
Patient seen. Lab reviewed, plan of care discussed with NP.
patient seen lab reviewed plan of care discussed with the NP.
Patient seen, Lab reviewed, plan of care discussed with the NP
Patient seen, Lab reviewed, plan of care discussed with the NP.
Patient seen, lab reviewed, plan of care discussed with the NP.
patient seen, Lab reviewed, Plan of care discussed with the NP

## 2023-08-03 NOTE — PROGRESS NOTE ADULT - PROBLEM SELECTOR PLAN 2
Suggest to continue medications, monitoring, FU primary team recommendations.
Suggest to continue medications, monitoring, FU primary team recommendations.
s/p ablation  c/w AC
Suggest to continue medications, monitoring, FU primary team recommendations.
Continue antihypertensive medications with hold parameters

## 2023-08-03 NOTE — PROGRESS NOTE ADULT - PROBLEM SELECTOR PROBLEM 1
Acute on chronic diastolic heart failure
Chronic atrial fibrillation
Hyperglycemia

## 2023-08-03 NOTE — PROGRESS NOTE ADULT - SUBJECTIVE AND OBJECTIVE BOX
Chief complaint  Patient is a 63y old  Female who presents with a chief complaint of ablation (31 Jul 2023 21:42)         Labs and Fingersticks  CAPILLARY BLOOD GLUCOSE      POCT Blood Glucose.: 127 mg/dL (03 Aug 2023 07:47)  POCT Blood Glucose.: 111 mg/dL (02 Aug 2023 21:07)  POCT Blood Glucose.: 125 mg/dL (02 Aug 2023 17:19)  POCT Blood Glucose.: 149 mg/dL (02 Aug 2023 11:36)      Anion Gap: 14 (08-02 @ 07:09)  Anion Gap: 15 (08-01 @ 17:54)      Calcium: 10.8 *H* (08-02 @ 07:09)  Calcium: 11.3 *H* (08-01 @ 17:54)          08-02    135  |  84<L>  |  22  ----------------------------<  99  3.8   |  37<H>  |  0.56    Ca    10.8<H>      02 Aug 2023 07:09  Mg     2.0     08-01                          15.9   10.91 )-----------( 207      ( 03 Aug 2023 07:10 )             50.6     Medications  MEDICATIONS  (STANDING):  albuterol/ipratropium for Nebulization 3 milliLiter(s) Nebulizer every 6 hours  apixaban 5 milliGRAM(s) Oral every 12 hours  atorvastatin 40 milliGRAM(s) Oral at bedtime  budesonide 160 MICROgram(s)/formoterol 4.5 MICROgram(s) Inhaler 2 Puff(s) Inhalation two times a day  buMETAnide 3 milliGRAM(s) Oral daily  chlorhexidine 2% Cloths 1 Application(s) Topical <User Schedule>  insulin glargine Injectable (LANTUS) 10 Unit(s) SubCutaneous at bedtime  insulin lispro (ADMELOG) corrective regimen sliding scale   SubCutaneous three times a day before meals  insulin lispro Injectable (ADMELOG) 4 Unit(s) SubCutaneous three times a day before meals  losartan 25 milliGRAM(s) Oral daily  nicotine -   7 mG/24Hr(s) Patch 1 Patch Transdermal daily  pantoprazole    Tablet 40 milliGRAM(s) Oral before breakfast  spironolactone 25 milliGRAM(s) Oral daily  tiotropium 2.5 MICROgram(s) Inhaler 2 Puff(s) Inhalation daily      Physical Exam  General: Patient comfortable in bed   Vital Signs Last 12 Hrs  T(F): 97.9 (08-03-23 @ 04:34), Max: 97.9 (08-03-23 @ 04:34)  HR: 89 (08-03-23 @ 04:34) (89 - 89)  BP: 117/71 (08-03-23 @ 04:34) (117/71 - 117/71)  BP(mean): --  RR: 18 (08-03-23 @ 04:34) (18 - 18)  SpO2: 95% (08-03-23 @ 04:34) (95% - 95%)    CVS: S1S2   Respiratory: No wheezing, no crepitations  GI: Abdomen soft, bowel sounds positive  Musculoskeletal:  moves all extremities  : Voiding

## 2023-08-03 NOTE — CHART NOTE - NSCHARTNOTEFT_GEN_A_CORE
Patient medically cleared per cardiology, HF, and Dr. Martinez.   Hemodynamically stable for discharge today. Bumex decreased as hypotensive but asymptomatic. Will repeat BP prior to dc.   DC meds reviewed and finalized with Dr. Martinez.   All questions answered to patient satisfaction.   No lantus/admelog on dc as discussed with endocrine as steriod taper completed.     Shyann Menchaca, PA-C  [id number] Patient medically cleared per cardiology, HF, and Dr. Martinez.   Hemodynamically stable for discharge today. Bumex decreased as hypotensive but asymptomatic. Will repeat BP prior to dc.   DC meds reviewed and finalized with Dr. Martinez.   All questions answered to patient satisfaction.   No lantus/admelog on dc as discussed with endocrine as steriod taper completed.       ADDENUM: Dr. Martinez Rx potassium today and to be continued on dc (patient is on spirlactone per HF). Will need to follow up with PMD.     Shyann Menchaca, PA-C  [id number]

## 2023-08-03 NOTE — PROGRESS NOTE ADULT - SUBJECTIVE AND OBJECTIVE BOX
24H hour events:     MEDICATIONS:  apixaban 5 milliGRAM(s) Oral every 12 hours  buMETAnide 3 milliGRAM(s) Oral daily  losartan 25 milliGRAM(s) Oral daily  spironolactone 25 milliGRAM(s) Oral daily      albuterol/ipratropium for Nebulization 3 milliLiter(s) Nebulizer every 6 hours  budesonide 160 MICROgram(s)/formoterol 4.5 MICROgram(s) Inhaler 2 Puff(s) Inhalation two times a day  tiotropium 2.5 MICROgram(s) Inhaler 2 Puff(s) Inhalation daily    acetaminophen     Tablet .. 650 milliGRAM(s) Oral every 6 hours PRN  oxycodone    5 mG/acetaminophen 325 mG 1 Tablet(s) Oral every 6 hours PRN    pantoprazole    Tablet 40 milliGRAM(s) Oral before breakfast    atorvastatin 40 milliGRAM(s) Oral at bedtime  insulin glargine Injectable (LANTUS) 10 Unit(s) SubCutaneous at bedtime  insulin lispro (ADMELOG) corrective regimen sliding scale   SubCutaneous three times a day before meals  insulin lispro Injectable (ADMELOG) 4 Unit(s) SubCutaneous three times a day before meals    chlorhexidine 2% Cloths 1 Application(s) Topical <User Schedule>      REVIEW OF SYSTEMS:  Complete 12 point ROS negative.    PHYSICAL EXAM:  T(C): 36.6 (08-03-23 @ 04:34), Max: 36.6 (08-02-23 @ 12:01)  HR: 89 (08-03-23 @ 04:34) (89 - 109)  BP: 117/71 (08-03-23 @ 04:34) (111/65 - 128/77)  RR: 18 (08-03-23 @ 04:34) (18 - 18)  SpO2: 95% (08-03-23 @ 04:34) (94% - 95%)  Wt(kg): --  I&O's Summary    02 Aug 2023 07:01  -  03 Aug 2023 07:00  --------------------------------------------------------  IN: 460 mL / OUT: 600 mL / NET: -140 mL        Appearance: Normal	  HEENT: PERRL, EOMI	  Cardiovascular: Normal S1 S2, No JVD, No murmurs  Respiratory: Lungs clear to auscultation	  Psychiatry: A & O x 3, Mood & affect appropriate  Gastrointestinal:  Soft, Non-tender, + BS	  Skin: No rashes, No ecchymoses, No cyanosis	  Neurologic: Grossly intact  Extremities: No clubbing, cyanosis or edema  Vascular: Peripheral pulses palpable 2+ bilaterally        LABS:	 	    CBC Full  -  ( 03 Aug 2023 07:10 )  WBC Count : 10.91 K/uL  Hemoglobin : 15.9 g/dL  Hematocrit : 50.6 %  Platelet Count - Automated : 207 K/uL  Mean Cell Volume : 94.8 fl  Mean Cell Hemoglobin : 29.8 pg  Mean Cell Hemoglobin Concentration : 31.4 gm/dL  Auto Neutrophil # : x  Auto Lymphocyte # : x  Auto Monocyte # : x  Auto Eosinophil # : x  Auto Basophil # : x  Auto Neutrophil % : x  Auto Lymphocyte % : x  Auto Monocyte % : x  Auto Eosinophil % : x  Auto Basophil % : x    08-02    135  |  84<L>  |  22  ----------------------------<  99  3.8   |  37<H>  |  0.56  08-01    135  |  82<L>  |  23  ----------------------------<  122<H>  4.2   |  38<H>  |  0.68    Ca    10.8<H>      02 Aug 2023 07:09  Ca    11.3<H>      01 Aug 2023 17:54  Mg     2.0     08-01        proBNP:   Lipid Profile:   HgA1c:   TSH:       CARDIAC MARKERS:          TELEMETRY: 	    ECG:  	  RADIOLOGY:  OTHER: 	    PREVIOUS DIAGNOSTIC TESTING:    [ ] Echocardiogram:    [ ]  Catheterization:  [ ] Stress Test:  	  	  ASSESSMENT/PLAN: 	     24H hour events: Pt continues to report post ablation inspirational chest discomfort but has been improving. Also reports SOB. No acute events overnight. Tele: AF with VHR , occasional VPCs    MEDICATIONS:  apixaban 5 milliGRAM(s) Oral every 12 hours  buMETAnide 3 milliGRAM(s) Oral daily  losartan 25 milliGRAM(s) Oral daily  spironolactone 25 milliGRAM(s) Oral daily  albuterol/ipratropium for Nebulization 3 milliLiter(s) Nebulizer every 6 hours  budesonide 160 MICROgram(s)/formoterol 4.5 MICROgram(s) Inhaler 2 Puff(s) Inhalation two times a day  tiotropium 2.5 MICROgram(s) Inhaler 2 Puff(s) Inhalation daily  acetaminophen     Tablet .. 650 milliGRAM(s) Oral every 6 hours PRN  oxycodone    5 mG/acetaminophen 325 mG 1 Tablet(s) Oral every 6 hours PRN  pantoprazole    Tablet 40 milliGRAM(s) Oral before breakfast  atorvastatin 40 milliGRAM(s) Oral at bedtime  insulin glargine Injectable (LANTUS) 10 Unit(s) SubCutaneous at bedtime  insulin lispro (ADMELOG) corrective regimen sliding scale   SubCutaneous three times a day before meals  insulin lispro Injectable (ADMELOG) 4 Unit(s) SubCutaneous three times a day before meals  chlorhexidine 2% Cloths 1 Application(s) Topical <User Schedule>      REVIEW OF SYSTEMS:  Complete 12 point ROS negative except see HPI    PHYSICAL EXAM:  T(C): 36.6 (08-03-23 @ 04:34), Max: 36.6 (08-02-23 @ 12:01)  HR: 89 (08-03-23 @ 04:34) (89 - 109)  BP: 117/71 (08-03-23 @ 04:34) (111/65 - 128/77)  RR: 18 (08-03-23 @ 04:34) (18 - 18)  SpO2: 95% (08-03-23 @ 04:34) (94% - 95%)  Wt(kg): --  I&O's Summary    02 Aug 2023 07:01  -  03 Aug 2023 07:00  --------------------------------------------------------  IN: 460 mL / OUT: 600 mL / NET: -140 mL        Appearance: NAD, OOB sitting up in chair   HEENT: PERRL, EOMI	  Cardiovascular: Normal S1 S2, irregular, No JVD, No murmurs  Respiratory: Lungs clear to auscultation, on 3L O2 via humidified NC	  Psychiatry: A & O x 3, Mood & affect appropriate  Gastrointestinal: Soft, Non-tender, + BS	  Skin: No rashes, No ecchymoses, No cyanosis	  Neurologic: Grossly intact  Extremities: No clubbing, cyanosis or edema. Right groin surgical incision site soft and non-tender   Vascular: Peripheral pulses palpable 2+ bilaterally        LABS:	 	    CBC Full  -  ( 03 Aug 2023 07:10 )  WBC Count : 10.91 K/uL  Hemoglobin : 15.9 g/dL  Hematocrit : 50.6 %  Platelet Count - Automated : 207 K/uL  Mean Cell Volume : 94.8 fl  Mean Cell Hemoglobin : 29.8 pg  Mean Cell Hemoglobin Concentration : 31.4 gm/dL      08-02    135  |  84<L>  |  22  ----------------------------<  99  3.8   |  37<H>  |  0.56  08-01    135  |  82<L>  |  23  ----------------------------<  122<H>  4.2   |  38<H>  |  0.68    Ca    10.8<H>      02 Aug 2023 07:09  Ca    11.3<H>      01 Aug 2023 17:54  Mg     2.0     08-01    Thyroid Stimulating Hormone, Serum (07.28.23 @ 06:33)    Thyroid Stimulating Hormone, Serum: 0.43 uIU/mL    Free Thyroxine, Serum in AM (07.29.23 @ 06:44)    Free Thyroxine, Serum: 1.1 ng/dL        TELEMETRY: AF with VHR , occasional VPCs  	      < from: TTE W or WO Ultrasound Enhancing Agent (07.27.23 @ 08:52) >  CONCLUSIONS:      1. Normal left ventricular cavity size. The left ventricular wall thickness is normal. The left ventricular systolic function is normal with an ejection fraction of 63 % by Jones's method of disks. There are no regional wall motion abnormalities seen.   2. There is severe (grade 3) left ventricular diastolic dysfunction, with elevated filling pressure.   3. Severely enlarged right ventricular cavity size, normal right ventricular wall thickness and reduced systolic right ventricular function.The tricuspid annular plane systolic excursion (TAPSE) is 1.3 cm (normal >=1.7 cm).   4. The left atrium is severely dilated in size.   5. The right atrium is severely dilated in size.   6. Trace mitral regurgitation.   7. Estimated pulmonary artery systolic pressure is 54 mmHg.   8. No pericardial effusion seen.   9. Compared to the transesophageal echocardiogram performed on 7/26/2023 Patient has evidence of elevated left sided filling pressures with new septal flattening.    ________________________________________________________________________________________  FINDINGS:     Left Ventricle:  Normal left ventricular cavity size. The left ventricular wall thickness is normal. The interventricular septum is flattened in systole and diastoleconsistent with right ventricular pressure and volume overload. The left ventricular systolic function is normal with a calculated ejection fraction of 63 % by the Jones's biplane method of disks. There are no regional wall motion abnormalities seen. There is severe (grade 3) left ventricular diastolic dysfunction, with elevated filling pressure. There is no evidence of a thrombus in the left ventricle.     Right Ventricle:  Severely enlarged right ventricular cavity size, normal wall thicknessand reduced right ventricular systolic function. Tricuspid annular plane systolic excursion (TAPSE) is 1.3 cm (normal >=1.7 cm). Tricuspid annular tissue Doppler S' is 6.5 cm/s (normal >10 cm/s).     Left Atrium:  The left atrium is severely dilated in size with an indexed volume of 71.91 ml/m². The pulmonary venous systolic velocity is blunted consistent with elevated left atrial pressure.     Right Atrium:  The right atrium is severely dilated in size with an indexed volume of 131.03 ml/m².     Aortic Valve:  The aortic valve appears trileaflet. There is no evidence of aortic regurgitation.     Mitral Valve:  Structurally normal mitral valve with normal leaflet excursion. There is mild posterior calcification of the mitral valve annulus. There is trace mitral regurgitation.     Tricuspid Valve:  Structurally normal tricuspid valve with normal leaflet excursion. There is tricuspid valve annular dilation. There is mild tricuspid regurgitation. Estimated pulmonary artery systolic pressure is 54 mmHg.     Pulmonic Valve:  Structurally normal pulmonic valve with normal leaflet excursion. There is trace pulmonic regurgitation.     Aorta:  The aortic annulus and aortic root appear normal in size.     Pericardium:  No pericardial effusion seen.     Systemic Veins:  The inferior vena cava is dilated measuring 2.70 cm in diameter, (dilated >2.1cm) with abnormal inspiratory collapse (abnormal <50%) consistent with elevated right atrial pressure (~15, range 10-20mmHg).  ____________________________________________________________________  Quantitative Data:  Left Ventricle Measurements: (Indexed to BSA)     IVSd (2D):   1.0 cm  LVPWd (2D):  0.9 cm  LVIDd (2D):  5.1 cm  LVIDs (2D):  3.6 cm  LV Mass:     171 g  90.9 g/m²  BiPlane LV EF%: 63 %     MV E Vmax:    1.43 m/s  MV A Vmax:    0.43 m/s  MV E/A:       3.35  e' lateral:   12.90 cm/s  e' medial:    6.96 cm/s  E/e' lateral: 11.09  E/e' medial:  20.55  E/e' Average: 14.40    Aorta Measurements:     Ao Root s, 2D: 3.2 cm       Left Atrium Measurements: (Indexed to BSA)  LA Diam 2D: 4.70 cm    Right Ventricle Measurements: Right Atrial Measurements:     TAPSE:           1.3 cm       RA Vol:       246.00 ml  TV Katarina. S':      6.53 cm/s    RA Vol Index: 131.03 ml/m²  RV Base (RVID1):6.5 cm  RV Mid (RVID2):  5.2 cm       LVOT / RVOT/ Qp/Qs Data: (Indexed to BSA)  LVOT Diameter: 2.00 cm  LVOT Vmax:     1.10 m/s  LVOT VTI:      20.50 cm  LVOT SV:       64.4 ml  34.30 ml/m²    Mitral Valve Measurements:     MV E Vmax: 1.4 m/s  MV A Vmax: 0.4 m/s  MV E/A:    3.3       Tricuspid Valve Measurements:     TR Vmax:          3.1 m/s  TR Peak Gradient: 38.7 mmHg  RA Pressure:      15 mmHg  PASP:             54 mmHg    < end of copied text >

## 2023-08-03 NOTE — PROGRESS NOTE ADULT - SUBJECTIVE AND OBJECTIVE BOX
Patient is a 63y old  Female who presents with a chief complaint of ablation (31 Jul 2023 21:42)      SUBJECTIVE / OVERNIGHT EVENTS: ptn feels well, awaiting dc home    MEDICATIONS  (STANDING):  albuterol/ipratropium for Nebulization 3 milliLiter(s) Nebulizer every 6 hours  apixaban 5 milliGRAM(s) Oral every 12 hours  atorvastatin 40 milliGRAM(s) Oral at bedtime  budesonide 160 MICROgram(s)/formoterol 4.5 MICROgram(s) Inhaler 2 Puff(s) Inhalation two times a day  buMETAnide 3 milliGRAM(s) Oral daily  chlorhexidine 2% Cloths 1 Application(s) Topical <User Schedule>  insulin glargine Injectable (LANTUS) 10 Unit(s) SubCutaneous at bedtime  insulin lispro (ADMELOG) corrective regimen sliding scale   SubCutaneous three times a day before meals  insulin lispro Injectable (ADMELOG) 4 Unit(s) SubCutaneous three times a day before meals  losartan 25 milliGRAM(s) Oral daily  nicotine -   7 mG/24Hr(s) Patch 1 Patch Transdermal daily  pantoprazole    Tablet 40 milliGRAM(s) Oral before breakfast  potassium chloride    Tablet ER 20 milliEquivalent(s) Oral daily  spironolactone 25 milliGRAM(s) Oral daily  tiotropium 2.5 MICROgram(s) Inhaler 2 Puff(s) Inhalation daily    MEDICATIONS  (PRN):  acetaminophen     Tablet .. 650 milliGRAM(s) Oral every 6 hours PRN Temp greater or equal to 38C (100.4F), Moderate Pain (4 - 6)  oxycodone    5 mG/acetaminophen 325 mG 1 Tablet(s) Oral every 6 hours PRN back pain      Vital Signs Last 24 Hrs  T(F): 97.6 (08-03-23 @ 11:16), Max: 97.9 (08-03-23 @ 04:34)  HR: 90 (08-03-23 @ 14:44) (65 - 97)  BP: 98/60 (08-03-23 @ 14:44) (84/45 - 128/77)  RR: 18 (08-03-23 @ 11:16) (18 - 18)  SpO2: 94% (08-03-23 @ 11:16) (94% - 95%)  Telemetry:   CAPILLARY BLOOD GLUCOSE      POCT Blood Glucose.: 126 mg/dL (03 Aug 2023 11:39)  POCT Blood Glucose.: 127 mg/dL (03 Aug 2023 07:47)  POCT Blood Glucose.: 111 mg/dL (02 Aug 2023 21:07)  POCT Blood Glucose.: 125 mg/dL (02 Aug 2023 17:19)    I&O's Summary    02 Aug 2023 07:01  -  03 Aug 2023 07:00  --------------------------------------------------------  IN: 460 mL / OUT: 600 mL / NET: -140 mL    03 Aug 2023 07:01  -  03 Aug 2023 15:48  --------------------------------------------------------  IN: 460 mL / OUT: 0 mL / NET: 460 mL        PHYSICAL EXAM:  GENERAL: NAD, well-developed  HEAD:  Atraumatic, Normocephalic  EYES: EOMI, PERRLA, conjunctiva and sclera clear  NECK: Supple, No JVD  CHEST/LUNG: Clear to auscultation bilaterally; No wheeze  HEART: Regular rate and rhythm; No murmurs, rubs, or gallops  ABDOMEN: Soft, Nontender, Nondistended; Bowel sounds present  EXTREMITIES:  2+ Peripheral Pulses, No clubbing, cyanosis, or edema  PSYCH: AAOx3  NEUROLOGY: non-focal  SKIN: No rashes or lesions    LABS:                        15.9   10.91 )-----------( 207      ( 03 Aug 2023 07:10 )             50.6     08-03    133<L>  |  84<L>  |  22  ----------------------------<  90  3.6   |  37<H>  |  0.58    Ca    10.9<H>      03 Aug 2023 07:10  Mg     2.0     08-01            Urinalysis Basic - ( 03 Aug 2023 07:10 )    Color: x / Appearance: x / SG: x / pH: x  Gluc: 90 mg/dL / Ketone: x  / Bili: x / Urobili: x   Blood: x / Protein: x / Nitrite: x   Leuk Esterase: x / RBC: x / WBC x   Sq Epi: x / Non Sq Epi: x / Bacteria: x        RADIOLOGY & ADDITIONAL TESTS:    Imaging Personally Reviewed:    Consultant(s) Notes Reviewed:      Care Discussed with Consultants/Other Providers:

## 2023-08-03 NOTE — PROGRESS NOTE ADULT - PROBLEM SELECTOR PROBLEM 2
HTN (hypertension)
Chronic atrial fibrillation
HTN (hypertension)

## 2023-08-03 NOTE — CHART NOTE - NSCHARTNOTEFT_GEN_A_CORE
Patient seen and examined at bedside. Ok for discharge on bumex 3mg daily, losartan 25mg daily, Increase spironolactone to 25mg BID.     Follow up in HF clinic on 8/18th at 9AM with Dr. Love Morales MD  Cardiology Fellow

## 2023-08-16 RX ORDER — BUDESONIDE AND FORMOTEROL FUMARATE DIHYDRATE 160; 4.5 UG/1; UG/1
160-4.5 AEROSOL RESPIRATORY (INHALATION)
Refills: 0 | Status: ACTIVE | COMMUNITY
Start: 2023-08-16

## 2023-08-16 RX ORDER — FUROSEMIDE 40 MG/1
40 TABLET ORAL DAILY
Refills: 0 | Status: DISCONTINUED | COMMUNITY
Start: 2023-05-23 | End: 2023-08-16

## 2023-08-18 ENCOUNTER — APPOINTMENT (OUTPATIENT)
Dept: HEART FAILURE | Facility: CLINIC | Age: 63
End: 2023-08-18

## 2023-08-30 ENCOUNTER — APPOINTMENT (OUTPATIENT)
Dept: HEART FAILURE | Facility: CLINIC | Age: 63
End: 2023-08-30
Payer: MEDICARE

## 2023-08-30 VITALS
BODY MASS INDEX: 27.31 KG/M2 | HEIGHT: 64 IN | HEART RATE: 68 BPM | WEIGHT: 160 LBS | OXYGEN SATURATION: 92 % | SYSTOLIC BLOOD PRESSURE: 129 MMHG | DIASTOLIC BLOOD PRESSURE: 66 MMHG

## 2023-08-30 DIAGNOSIS — I44.30 UNSPECIFIED ATRIOVENTRICULAR BLOCK: ICD-10-CM

## 2023-08-30 PROCEDURE — 99215 OFFICE O/P EST HI 40 MIN: CPT

## 2023-08-30 RX ORDER — LOSARTAN POTASSIUM 25 MG/1
25 TABLET, FILM COATED ORAL DAILY
Refills: 0 | Status: DISCONTINUED | COMMUNITY
Start: 2023-08-16 | End: 2023-08-30

## 2023-08-30 RX ORDER — POTASSIUM CHLORIDE 1500 MG/1
20 TABLET, FILM COATED, EXTENDED RELEASE ORAL
Refills: 0 | Status: DISCONTINUED | COMMUNITY
Start: 2023-08-16 | End: 2023-08-30

## 2023-08-30 NOTE — PHYSICAL EXAM
[Normal Conjunctiva] : normal conjunctiva [Normal Venous Pressure] : normal venous pressure [Normal S1, S2] : normal S1, S2 [No Rub] : no rub [No Gallop] : no gallop [Murmur] : murmur [Soft] : abdomen soft [Non Tender] : non-tender [Normal Bowel Sounds] : normal bowel sounds [Normal Radial B/L] : normal radial B/L [Normal] : alert and oriented, normal memory [de-identified] : JVP 6-8 cm H2O, no HJR [de-identified] : II/VI SM [de-identified] : Irregularly irregular [de-identified] : Warm peripherally

## 2023-08-30 NOTE — CARDIOLOGY SUMMARY
[de-identified] : TTE 7/26/23: grossly normal appearing LVF, mod RVE, BiAtrial enlargement, mild MR, mod TR, PASP 51mmHg  [de-identified] : PET-CT 4/20/23: no evidence of FDG-avid myocardial inflammation. Diffuse myocardial hypermetabolism seen on prior study on 4/6/23 is not seen in current study, was most likely related to inadequate dietary prep.  Coronary CT 12/21/22: no hemodynamically significant stenosis identified   [de-identified] : 7/31/23 Tc-PYP scan: Not suggestive of TTR cardiac amyloid

## 2023-08-30 NOTE — HISTORY OF PRESENT ILLNESS
[FreeTextEntry1] : Ms. Campa is a 63 year old woman with HFpEF, AVB s/p dual chamber PPM 2018, ?cardiac arrest requiring shock, AF (on Eliquis, Dx at 51 years), HTN and former smoker (quit July 2023) who presents for follow-up after recent hospitalization.   Had PET CT in April 2023 to rule out cardiac sarcoid.   Admitted 7/26 - 8/3/23, initially for elective atypical flutter ablation, EPS revealing extensively scarred biatria. Found to be in ADHF and was diuresed approximately 20 lbs. Had Tc-PYP scan that was not suggestive of cardiac amyloid. She was discharged at a weight of 157 lbs.  Since return home, initially struggled with nausea and marginal BP, systolic in 70s accompanied by LH. Thus adjusted her medications, lowered Bumex to 1 mg QD from 2 mg QD, also Spironolactone to 25 QD from 25 BID and stopped Losartan 25 mg QD. With these changes, feeling much improved. No further LH and nausea nearly resolved. SBP ranging 110-120s. Weight 160-162 lbs, stable. No longer limited by SOB, walking her dog 3 blocks. Stairs are difficult due to gait imbalance. Denies CP, palpitations, orthopnea, PND, ABD discomfort and LE swelling. Continues to abstain from smoking.

## 2023-08-30 NOTE — ASSESSMENT
[FreeTextEntry1] : 63 year old woman with HFpEF, AVB s/p dual chamber PPM 2018, ?cardiac arrest requiring shock, AF (on Eliquis, Dx at 51 years), HTN and former smoker (quit July 2023) who was recently hospitalized for ADHF diuresed 20 lbs, initially presenting for atypical aflutter ablation. She is ACC/AHA Stage C, NYHA Class II, euvolemic and normotensive.   #HFpEF - Continue Bumex 1 mg QD, target home weight 160-162 lbs - Continue Spironolactone 25 mg QD and can stop KCL 20 meq QD.  - Continue Farxiga 10 mg QD - Additionally, reasonable to remain off Losartan as is normotensive and no other indications such as CAD or CKD - Tc-PYP scan 7/2023 not suggestive of TTR amyloid. FLC ratio normal and no monoclonal bands identified. - Labs from 8/24 with K 5, Cr 0.5, normal LFTs aside from ALK/PHOS 195 and last pro-BNP was from 7/28 4,200.   #Atrial fibrillation - Underwent atypical aflutter ablation 7/26/23 - Would consider genetic testing given occurrence of AF at a young age and extensive biatrial scarring noted on EPS. Reports previously having had cardiogenetic testing though no records available at [facility], will inquire with Dr. Serna, her primary cardiologist.  - PET-CT negative 4/2023 - On AC with Eliquis - Followed by Dr. Roca  #CHB - Has dual chamber ICD, interrogation revealing 48% V pacing  - No indication for upgrade to CRT as LVEF is currently preserved   Follow-up with Dr. Aleman at [facility] in 7 weeks (patient lives in [facility])

## 2023-09-10 NOTE — DISCUSSION/SUMMARY
[FreeTextEntry1] : 63 years old with  history of pacemaker implantation.  Approximately 10 years ago in [place] she was told that she had hypertension and atrial fibrillation.  At one point she was on metoprolol and lisinopril, but she is no longer taking these medications.  In 2018 she presented to the hospital with HF. She was told during this hospitalization that she had a very low heart beat.  She underwent PPM implantation.  She felt great after the pacemaker.  However it quickly got worse.  On 7/26/2023 she underwent EP testing and ablation.  This study demonstrated extensive LA and RA scarring with multiple atypical flutters.  She was evaluated by the HF team post procedure.

## 2023-09-10 NOTE — HISTORY OF PRESENT ILLNESS
[FreeTextEntry1] : 63 years old with  history of pacemaker implantation.  Approximately 10 years ago in [place] she was told that she had hypertension and atrial fibrillation.  At one point she was on metoprolol and lisinopril, but she is no longer taking these medications.  In 2018 she presented to the hospital with HF. She was told during this hospitalization that she had a very low heart beat.  She underwent PPM implantation.  She felt great after the pacemaker.  However it quickly got worse.  On 7/26/2023 she underwent EP testing and ablation.  This study demonstrated extensive LA and RA scarring with multiple atypical flutters.  She was evaluated by the HF team post procedure.

## 2023-09-10 NOTE — PROCEDURE
[Pacemaker] : pacemaker [de-identified] : [employer] [de-identified] : [employer]urity MRI [de-identified] : [id number] [de-identified] : 4/17/2018

## 2023-09-10 NOTE — CARDIOLOGY SUMMARY
[de-identified] : today: AF\par  2/14/2023: Atrial flutter. RBBB \par   -Incomplete right bundle branch block and left axis -anterior fascicular block. \par   -Anterior infarct -age undetermined.  [de-identified] : 10/17/2022\par  Global LV systolic function is within normal limits based on limitedviews\par  The EF is 60-65% by visual estimate\par  Mild KATHE\par  LA is moderately dilated\par  RA is moderately dilated\par  Mild TR [de-identified] : 12/12/2022\par  Atherosclerotic changes with no hemodynamically significant stenosis. [de-identified] : PET 4/13/2023\par  No evidence of FDG avid myocardial inflammation [de-identified] : 7/26/2023  extensive LA and RA scarring with multiple atypical flutters

## 2023-09-12 ENCOUNTER — NON-APPOINTMENT (OUTPATIENT)
Age: 63
End: 2023-09-12

## 2023-09-12 ENCOUNTER — APPOINTMENT (OUTPATIENT)
Dept: ELECTROPHYSIOLOGY | Facility: CLINIC | Age: 63
End: 2023-09-12
Payer: MEDICARE

## 2023-09-12 VITALS — SYSTOLIC BLOOD PRESSURE: 130 MMHG | DIASTOLIC BLOOD PRESSURE: 74 MMHG | OXYGEN SATURATION: 92 % | HEART RATE: 82 BPM

## 2023-09-12 PROCEDURE — 99213 OFFICE O/P EST LOW 20 MIN: CPT

## 2023-09-12 PROCEDURE — 93279 PRGRMG DEV EVAL PM/LDLS PM: CPT

## 2023-09-12 RX ORDER — PANTOPRAZOLE 40 MG/1
40 TABLET, DELAYED RELEASE ORAL DAILY
Refills: 0 | Status: DISCONTINUED | COMMUNITY
Start: 2023-08-16 | End: 2023-09-12

## 2023-09-12 RX ORDER — SPIRONOLACTONE 25 MG/1
25 TABLET ORAL
Refills: 0 | Status: ACTIVE | COMMUNITY
Start: 2023-08-16

## 2023-09-12 RX ORDER — TIOTROPIUM BROMIDE INHALATION SPRAY 3.12 UG/1
2.5 SPRAY, METERED RESPIRATORY (INHALATION)
Refills: 0 | Status: DISCONTINUED | COMMUNITY
Start: 2023-08-16 | End: 2023-09-12

## 2023-09-14 ENCOUNTER — APPOINTMENT (OUTPATIENT)
Dept: PULMONOLOGY | Facility: CLINIC | Age: 63
End: 2023-09-14
Payer: MEDICARE

## 2023-09-14 VITALS
DIASTOLIC BLOOD PRESSURE: 60 MMHG | HEIGHT: 64 IN | WEIGHT: 156 LBS | RESPIRATION RATE: 16 BRPM | SYSTOLIC BLOOD PRESSURE: 110 MMHG | BODY MASS INDEX: 26.63 KG/M2

## 2023-09-14 VITALS — HEART RATE: 50 BPM | OXYGEN SATURATION: 94 %

## 2023-09-14 DIAGNOSIS — Z87.891 PERSONAL HISTORY OF NICOTINE DEPENDENCE: ICD-10-CM

## 2023-09-14 DIAGNOSIS — J44.9 CHRONIC OBSTRUCTIVE PULMONARY DISEASE, UNSPECIFIED: ICD-10-CM

## 2023-09-14 DIAGNOSIS — J43.9 EMPHYSEMA, UNSPECIFIED: ICD-10-CM

## 2023-09-14 PROCEDURE — 99214 OFFICE O/P EST MOD 30 MIN: CPT

## 2023-09-14 RX ORDER — INHALER,ASSIST DEVICE,MED MASK
SPACER (EA) MISCELLANEOUS
Qty: 1 | Refills: 3 | Status: ACTIVE | COMMUNITY
Start: 2023-09-14 | End: 1900-01-01

## 2023-09-14 RX ORDER — IPRATROPIUM BROMIDE AND ALBUTEROL SULFATE 2.5; .5 MG/3ML; MG/3ML
0.5-2.5 (3) SOLUTION RESPIRATORY (INHALATION) 4 TIMES DAILY
Refills: 0 | Status: DISCONTINUED | COMMUNITY
Start: 2023-08-16 | End: 2023-09-14

## 2023-09-14 RX ORDER — ALBUTEROL SULFATE 90 UG/1
108 AEROSOL, METERED RESPIRATORY (INHALATION)
Refills: 0 | Status: ACTIVE | COMMUNITY

## 2023-09-15 NOTE — PATIENT PROFILE ADULT - NSPROPTRIGHTCAREGIVER_GEN_A_NUR
Patient in PACU with severe abdominal pain. VS as charted. Dr Celina Weathers at bedside and places 18Fr NG to continuous wall suction. KUB performed at bedside. Dr Jolene Salmeron consult per perfectserve. Orders received per Dr Jolene Salmeron. no

## 2023-09-28 ENCOUNTER — OUTPATIENT (OUTPATIENT)
Dept: OUTPATIENT SERVICES | Facility: HOSPITAL | Age: 63
LOS: 1 days | End: 2023-09-28
Payer: MEDICARE

## 2023-09-28 DIAGNOSIS — G47.33 OBSTRUCTIVE SLEEP APNEA (ADULT) (PEDIATRIC): ICD-10-CM

## 2023-09-28 DIAGNOSIS — Z87.19 PERSONAL HISTORY OF OTHER DISEASES OF THE DIGESTIVE SYSTEM: Chronic | ICD-10-CM

## 2023-09-28 PROCEDURE — 95800 SLP STDY UNATTENDED: CPT | Mod: 26

## 2023-09-28 PROCEDURE — 95800 SLP STDY UNATTENDED: CPT

## 2023-10-09 ENCOUNTER — APPOINTMENT (OUTPATIENT)
Dept: PULMONOLOGY | Facility: CLINIC | Age: 63
End: 2023-10-09
Payer: MEDICARE

## 2023-10-09 VITALS
HEART RATE: 51 BPM | HEIGHT: 64 IN | DIASTOLIC BLOOD PRESSURE: 70 MMHG | SYSTOLIC BLOOD PRESSURE: 124 MMHG | BODY MASS INDEX: 26.63 KG/M2 | OXYGEN SATURATION: 96 % | RESPIRATION RATE: 16 BRPM | WEIGHT: 156 LBS

## 2023-10-09 DIAGNOSIS — R40.0 SOMNOLENCE: ICD-10-CM

## 2023-10-09 DIAGNOSIS — G47.33 OBSTRUCTIVE SLEEP APNEA (ADULT) (PEDIATRIC): ICD-10-CM

## 2023-10-09 DIAGNOSIS — I50.32 CHRONIC DIASTOLIC (CONGESTIVE) HEART FAILURE: ICD-10-CM

## 2023-10-09 DIAGNOSIS — I48.91 UNSPECIFIED ATRIAL FIBRILLATION: ICD-10-CM

## 2023-10-09 PROCEDURE — 99215 OFFICE O/P EST HI 40 MIN: CPT

## 2023-10-17 ENCOUNTER — APPOINTMENT (OUTPATIENT)
Dept: HEART FAILURE | Facility: CLINIC | Age: 63
End: 2023-10-17
Payer: MEDICARE

## 2023-10-17 VITALS
WEIGHT: 156 LBS | SYSTOLIC BLOOD PRESSURE: 149 MMHG | RESPIRATION RATE: 16 BRPM | HEART RATE: 60 BPM | HEIGHT: 64 IN | OXYGEN SATURATION: 95 % | DIASTOLIC BLOOD PRESSURE: 72 MMHG | BODY MASS INDEX: 26.63 KG/M2

## 2023-10-17 DIAGNOSIS — I50.9 HEART FAILURE, UNSPECIFIED: ICD-10-CM

## 2023-10-17 PROCEDURE — 93000 ELECTROCARDIOGRAM COMPLETE: CPT

## 2023-10-17 PROCEDURE — 99214 OFFICE O/P EST MOD 30 MIN: CPT | Mod: 25

## 2023-10-17 RX ORDER — ZOLPIDEM TARTRATE 10 MG/1
10 TABLET ORAL
Qty: 1 | Refills: 0 | Status: DISCONTINUED | COMMUNITY
Start: 2023-10-09 | End: 2023-10-17

## 2023-10-17 RX ORDER — DAPAGLIFLOZIN 10 MG/1
10 TABLET, FILM COATED ORAL
Refills: 0 | Status: ACTIVE | COMMUNITY

## 2023-10-17 RX ORDER — BUMETANIDE 1 MG/1
1 TABLET ORAL
Qty: 30 | Refills: 0 | Status: ACTIVE | COMMUNITY
Start: 2023-08-16

## 2023-10-20 ENCOUNTER — NON-APPOINTMENT (OUTPATIENT)
Age: 63
End: 2023-10-20

## 2024-01-02 ENCOUNTER — APPOINTMENT (OUTPATIENT)
Dept: PULMONOLOGY | Facility: CLINIC | Age: 64
End: 2024-01-02

## 2024-01-12 ENCOUNTER — APPOINTMENT (OUTPATIENT)
Dept: CARDIOLOGY | Facility: CLINIC | Age: 64
End: 2024-01-12

## 2024-01-16 ENCOUNTER — APPOINTMENT (OUTPATIENT)
Dept: PULMONOLOGY | Facility: CLINIC | Age: 64
End: 2024-01-16

## 2024-03-12 ENCOUNTER — APPOINTMENT (OUTPATIENT)
Dept: ELECTROPHYSIOLOGY | Facility: CLINIC | Age: 64
End: 2024-03-12

## 2024-06-12 ENCOUNTER — NON-APPOINTMENT (OUTPATIENT)
Age: 64
End: 2024-06-12

## 2024-07-09 ENCOUNTER — APPOINTMENT (OUTPATIENT)
Dept: CARDIOTHORACIC SURGERY | Facility: CLINIC | Age: 64
End: 2024-07-09
Payer: MEDICARE

## 2024-07-09 VITALS
HEART RATE: 68 BPM | HEIGHT: 63 IN | DIASTOLIC BLOOD PRESSURE: 65 MMHG | BODY MASS INDEX: 28.35 KG/M2 | SYSTOLIC BLOOD PRESSURE: 130 MMHG | RESPIRATION RATE: 16 BRPM | WEIGHT: 160 LBS | OXYGEN SATURATION: 95 %

## 2024-07-09 DIAGNOSIS — I48.91 UNSPECIFIED ATRIAL FIBRILLATION: ICD-10-CM

## 2024-07-09 DIAGNOSIS — I44.30 UNSPECIFIED ATRIOVENTRICULAR BLOCK: ICD-10-CM

## 2024-07-09 DIAGNOSIS — I36.1 NONRHEUMATIC TRICUSPID (VALVE) INSUFFICIENCY: ICD-10-CM

## 2024-07-09 DIAGNOSIS — I73.9 PERIPHERAL VASCULAR DISEASE, UNSPECIFIED: ICD-10-CM

## 2024-07-09 PROCEDURE — 99204 OFFICE O/P NEW MOD 45 MIN: CPT

## 2024-08-21 ENCOUNTER — NON-APPOINTMENT (OUTPATIENT)
Age: 64
End: 2024-08-21

## 2024-08-21 ENCOUNTER — APPOINTMENT (OUTPATIENT)
Dept: CARDIOLOGY | Facility: CLINIC | Age: 64
End: 2024-08-21
Payer: MEDICARE

## 2024-08-21 VITALS
WEIGHT: 140 LBS | HEART RATE: 67 BPM | HEIGHT: 63 IN | SYSTOLIC BLOOD PRESSURE: 116 MMHG | DIASTOLIC BLOOD PRESSURE: 60 MMHG | OXYGEN SATURATION: 95 % | BODY MASS INDEX: 24.8 KG/M2

## 2024-08-21 DIAGNOSIS — I44.30 UNSPECIFIED ATRIOVENTRICULAR BLOCK: ICD-10-CM

## 2024-08-21 DIAGNOSIS — I48.91 UNSPECIFIED ATRIAL FIBRILLATION: ICD-10-CM

## 2024-08-21 DIAGNOSIS — I50.30 UNSPECIFIED DIASTOLIC (CONGESTIVE) HEART FAILURE: ICD-10-CM

## 2024-08-21 PROCEDURE — 99215 OFFICE O/P EST HI 40 MIN: CPT | Mod: 25

## 2024-08-21 PROCEDURE — 93000 ELECTROCARDIOGRAM COMPLETE: CPT

## 2024-08-21 NOTE — ASSESSMENT
[FreeTextEntry1] : 65 yo F with HFpEF, AVB s/p dual chamber PPM 2018,?cardiac arrest requiring shock, AF (on Eliquis, Dx at 51 years) s/p ablation, HTN and former smoker (quit July 2023) who presents to reestablish heart failure care.  She is ACC/AHA Stage C with NYHA Class II symptoms.

## 2024-08-21 NOTE — HISTORY OF PRESENT ILLNESS
[FreeTextEntry1] : 63 yo F with HFpEF, AVB s/p dual chamber PPM 2018,?cardiac arrest requiring shock, AF (on Eliquis, Dx at 51 years) s/p ablation, HTN and former smoker (quit July 2023) who presents to reestablish heart failure care.  Patient's cardiac history dates back to~ 2010 in [place] where she was diagnosed with HTN and atrial fibrillation.  She was treated with metoprolol lisinopril however was taken off these medications.  In 2018, she presented for her index heart failure admission.  She was told she had a cardiac arrest in the setting of low heartbeat and underwent PPM implantation.  She reestablish care with Dr. Roca in February 2023.  Had PET CT in April 2023 to rule out cardiac sarcoid.  Her last hospital admission was from 7/26 - 8/3/23, initially for elective atypical flutter ablation, EPS revealing extensively scarred atriums. Found to be in ADHF and was diuresed approximately 20 lbs. Had Tc-PYP scan that was not suggestive of cardiac amyloid. She was discharged at a weight of 157 lbs. Had sleep study 9/28/23 which showed moderate sleep apnea and was recommended to have CPAP.  Patient was previously followed by [facility] heart failure team, Dr. Aleman. Last seen in 10/9/23.    Today, she reports struggling with lower extremity edema or new orthopnea intermittently over the past few months.  This developed shortly after a trip to [place].  She initially was taking her spironolactone only as needed however when instructed by her primary cardiologist to take this daily most of her symptoms have improved.  Today she denies chest pain, palpitations, dyspnea at rest or exertion, orthopnea, leg swelling, changes in appetite, changes in weight, bendopnea, lightheadedness, dizziness, and syncope/pre-syncope.  She has noticed overall her weight has decreased from 184 at the time of her ablation down to 146 pounds.  She is not checking her weight or blood pressure daily at home.

## 2024-08-21 NOTE — CARDIOLOGY SUMMARY
[de-identified] : 8/21/2024: Unable to determine if underlying rhythm is A-fib however V pacing with multifocal PVCs. [de-identified] : 5/16/24 ([facility]): LVEF 60 to 65%, LVEDD 4.7 cm, normal RV, mild biatrial enlargement, trace MR, mild-mod TR, PASP 40-50 mmHg 7/26/23: grossly normal appearing LVF, mod RVE, BiAtrial enlargement, mild MR, mod TR, PASP 51mmHg  [de-identified] : PET-CT 4/20/23: no evidence of FDG-avid myocardial inflammation. Diffuse myocardial hypermetabolism seen on prior study on 4/6/23 is not seen in current study, was most likely related to inadequate dietary prep. CCTA 12/21/22: no hemodynamically significant stenosis   Coronary CT 12/21/22: no hemodynamically significant stenosis identified   [de-identified] : 7/31/23 Tc-PYP scan: Not suggestive of TTR cardiac amyloid

## 2024-08-21 NOTE — PHYSICAL EXAM
[Normal Venous Pressure] : normal venous pressure [Normal S1, S2] : normal S1, S2 [Murmur] : murmur [Normal] : soft, non-tender, no masses/organomegaly, normal bowel sounds [No Edema] : no edema [Moves all extremities] : moves all extremities [Alert and Oriented] : alert and oriented [de-identified] : JVP at clavicle [de-identified] : grade 2/6 systolic murmur at left lower sternal border [de-identified] : Irregular [de-identified] : warm [de-identified] : Warm peripherally

## 2024-08-21 NOTE — DISCUSSION/SUMMARY
[FreeTextEntry1] : # HFpEF   - ETIOLOGY: - multifactorial.   - CardioPET 4/2022 without FDG uptake.  - Tc-PYP scan 7/2023 not suggestive of TTR amyloid. FLC ratio normal and no monoclonal bands identified.   - TARGETED THERAPY: -  Continue on spironolactone 25 mg QD and Farxiga 10 mg QD.   - DIURETICS: - Continue Bumex 1 mg QD, target home weight 160-162 lb. -She is not checking her weights daily.  We discussed doing this and if her weight goes up by more than 2 pounds in a day she should take an extra dose of Bumex 1 mg. -I introduced the idea of a CardioMEMS for her.  She watched the introduction video. She is interested but would like to hold off to see if she has ongoing HF symtpoms.  - LABS: -Will obtain labs from her cardiologist.  # Tricuspid regurgitation -Patient seen by Dr. Oseguera on 7/9/2024 however, no indication at this time for tricuspid valve repair. -Will continue to optimize her medical management.   #Atrial fibrillation - Underwent atypical aflutter ablation 7/26/23 - Would consider genetic testing given occurrence of AF at a young age and extensive biatrial scarring noted on EPS. Reports previously having had cardiogenetic testing though no records available at [facility], will inquire with Dr. Serna, her primary cardiologist.  - AC: with Eliquis - Followed by Dr. Roca  #CHB - Has dual chamber ICD, prior interrogations revealing up to 40% V pacing  - No indication for upgrade to CRT as LVEF as been preserved in the past.  May need this in the future.  - per patient she had echocardiogram performed at [facility].  Will obtain results.  # Frequent PVCs -She seems to have PVCs from multiple foci on her EKG.  I have asked her to make a follow-up appoint with her EP team.  Unclear how much her PVC burden is playing into this.  May need Holter monitor to quantify.  F/U in 3 months.

## 2024-10-15 ENCOUNTER — APPOINTMENT (OUTPATIENT)
Dept: ELECTROPHYSIOLOGY | Facility: CLINIC | Age: 64
End: 2024-10-15
Payer: MEDICARE

## 2024-10-15 ENCOUNTER — NON-APPOINTMENT (OUTPATIENT)
Age: 64
End: 2024-10-15

## 2024-10-15 VITALS
WEIGHT: 136 LBS | SYSTOLIC BLOOD PRESSURE: 119 MMHG | OXYGEN SATURATION: 98 % | HEART RATE: 59 BPM | BODY MASS INDEX: 24.1 KG/M2 | DIASTOLIC BLOOD PRESSURE: 57 MMHG | HEIGHT: 63 IN

## 2024-10-15 DIAGNOSIS — I44.30 UNSPECIFIED ATRIOVENTRICULAR BLOCK: ICD-10-CM

## 2024-10-15 DIAGNOSIS — I48.91 UNSPECIFIED ATRIAL FIBRILLATION: ICD-10-CM

## 2024-10-15 DIAGNOSIS — I49.3 VENTRICULAR PREMATURE DEPOLARIZATION: ICD-10-CM

## 2024-10-15 PROCEDURE — 93280 PM DEVICE PROGR EVAL DUAL: CPT

## 2024-10-15 PROCEDURE — 99213 OFFICE O/P EST LOW 20 MIN: CPT | Mod: 25

## 2024-11-20 ENCOUNTER — APPOINTMENT (OUTPATIENT)
Dept: CARDIOLOGY | Facility: CLINIC | Age: 64
End: 2024-11-20
Payer: MEDICARE

## 2024-11-20 VITALS
HEIGHT: 63 IN | HEART RATE: 76 BPM | OXYGEN SATURATION: 96 % | DIASTOLIC BLOOD PRESSURE: 56 MMHG | BODY MASS INDEX: 24.8 KG/M2 | SYSTOLIC BLOOD PRESSURE: 108 MMHG | WEIGHT: 140 LBS

## 2024-11-20 DIAGNOSIS — I50.32 CHRONIC DIASTOLIC (CONGESTIVE) HEART FAILURE: ICD-10-CM

## 2024-11-20 DIAGNOSIS — I36.1 NONRHEUMATIC TRICUSPID (VALVE) INSUFFICIENCY: ICD-10-CM

## 2024-11-20 DIAGNOSIS — I49.3 VENTRICULAR PREMATURE DEPOLARIZATION: ICD-10-CM

## 2024-11-20 DIAGNOSIS — I48.91 UNSPECIFIED ATRIAL FIBRILLATION: ICD-10-CM

## 2024-11-20 DIAGNOSIS — J44.9 CHRONIC OBSTRUCTIVE PULMONARY DISEASE, UNSPECIFIED: ICD-10-CM

## 2024-11-20 DIAGNOSIS — I50.30 UNSPECIFIED DIASTOLIC (CONGESTIVE) HEART FAILURE: ICD-10-CM

## 2024-11-20 PROCEDURE — G2211 COMPLEX E/M VISIT ADD ON: CPT

## 2024-11-20 PROCEDURE — 99215 OFFICE O/P EST HI 40 MIN: CPT

## 2024-11-20 NOTE — CONSULT NOTE ADULT - PROVIDER SPECIALTY LIST ADULT
Cardiology
Pt well controlled with increase in Metoprolol. Continue medications as ordered and continue to monitor home BP.  Discussed low salt diet, increasing exercise to 30 mins 3-4x a week. Check home BP and record for next visit. BP goal <140/90. Discussed ER precautions for chest pain, stroke precautions, or BP of 180/120 or greater (hypertensive crisis), change in LOC or worsening symptoms. Call with new or worsening symptoms.   If you have numbness, tingling, weakness, or severe headache with elevated BP go to the ED.    Orders:    metoprolol succinate (Toprol XL) 100 mg 24 hr tablet; Take 1 tablet (100 mg total) by mouth daily    
Heart Failure
Pulmonology
Infectious Disease
Endocrinology

## 2024-11-27 NOTE — REVIEW OF SYSTEMS
Patient here for blood pressure check. Patient's blood pressure was 131/77. Patient denies chest pain, shortness of breath, headache, dizziness, visual changes. Patient stated that she has some tingling in her feet that is chronic. Patient stated that she has seen an outside provider for this and is currently taking EB-N6 for it. Patient rates the foot pain a 4/10 bilaterally. Patient is requesting a PA for Ozempic to be completed.  Patient provided number that provider can be called at [phone] and fax # [phone]. Patient is also requesting for [facility] 2025 form to be completed that she uploaded to portal. RN informed patient  that MD Fan would be notified. RN provided patient with ED/911 precautions. Patient verbalized understanding. Patient discharged in stable condition.       Review Flowsheet  More data exists         11/27/2024   PHQ 2/9 Score   Adult PHQ 2 Score 1   Adult PHQ 2 Interpretation No further screening needed   Little interest or pleasure in activity? Several days   Feeling down, depressed or hopeless? Not at all      Details                   DEPRESSION ASSESSMENT/PLAN:  Chart sent to MD Fan     [Feeling Fatigued] : feeling fatigued [SOB] : shortness of breath [Negative] : Heme/Lymph

## 2024-12-20 NOTE — PROGRESS NOTE ADULT - SUBJECTIVE AND OBJECTIVE BOX
Chief complaint  Patient is a 63y old  Female who presents with a chief complaint of shortness of breath (28 Jul 2023 12:33)         Labs and Fingersticks  CAPILLARY BLOOD GLUCOSE      POCT Blood Glucose.: 186 mg/dL (29 Jul 2023 21:26)  POCT Blood Glucose.: 139 mg/dL (29 Jul 2023 17:17)  POCT Blood Glucose.: 151 mg/dL (29 Jul 2023 11:32)      Anion Gap: 9 (07-30 @ 04:18)  Anion Gap: 12 (07-29 @ 06:44)  Anion Gap: 13 (07-28 @ 14:09)      Calcium: 10.4 (07-30 @ 04:18)  Calcium: 10.9 *H* (07-29 @ 06:44)  Calcium: 10.1 (07-28 @ 14:09)  Albumin: 4.3 (07-30 @ 04:18)  Albumin: 4.4 (07-29 @ 06:44)    Alanine Aminotransferase (ALT/SGPT): 8 *L* (07-30 @ 04:18)  Alanine Aminotransferase (ALT/SGPT): 9 *L* (07-29 @ 06:44)  Alkaline Phosphatase: 96 (07-30 @ 04:18)  Alkaline Phosphatase: 104 (07-29 @ 06:44)  Aspartate Aminotransferase (AST/SGOT): 11 (07-30 @ 04:18)  Aspartate Aminotransferase (AST/SGOT): 17 (07-29 @ 06:44)        07-30    136  |  90<L>  |  22  ----------------------------<  161<H>  3.8   |  37<H>  |  0.46<L>    Ca    10.4      30 Jul 2023 04:18  Phos  1.5     07-30  Mg     2.0     07-30    TPro  7.7  /  Alb  4.3  /  TBili  0.6  /  DBili  x   /  AST  11  /  ALT  8<L>  /  AlkPhos  96  07-30                        13.9   10.07 )-----------( 130      ( 30 Jul 2023 04:18 )             44.0     Medications  MEDICATIONS  (STANDING):  albuterol/ipratropium for Nebulization 3 milliLiter(s) Nebulizer every 6 hours  apixaban 5 milliGRAM(s) Oral every 12 hours  atorvastatin 40 milliGRAM(s) Oral at bedtime  azithromycin  IVPB 500 milliGRAM(s) IV Intermittent every 24 hours  budesonide 160 MICROgram(s)/formoterol 4.5 MICROgram(s) Inhaler 2 Puff(s) Inhalation two times a day  cefTRIAXone   IVPB 1000 milliGRAM(s) IV Intermittent every 24 hours  cefTRIAXone   IVPB      chlorhexidine 2% Cloths 1 Application(s) Topical <User Schedule>  colchicine 0.6 milliGRAM(s) Oral daily  furosemide Infusion 10 mG/Hr (5 mL/Hr) IV Continuous <Continuous>  hydrALAZINE 10 milliGRAM(s) Oral three times a day  insulin glargine Injectable (LANTUS) 15 Unit(s) SubCutaneous at bedtime  insulin lispro (ADMELOG) corrective regimen sliding scale   SubCutaneous three times a day before meals  insulin lispro Injectable (ADMELOG) 4 Unit(s) SubCutaneous three times a day before meals  nicotine -   7 mG/24Hr(s) Patch 1 Patch Transdermal daily  pantoprazole    Tablet 40 milliGRAM(s) Oral before breakfast  tiotropium 2.5 MICROgram(s) Inhaler 2 Puff(s) Inhalation daily      Physical Exam  General: Patient comfortable in bed   Vital Signs Last 12 Hrs  T(F): 98.3 (07-30-23 @ 03:46), Max: 98.3 (07-30-23 @ 03:46)  HR: 65 (07-30-23 @ 03:46) (65 - 105)  BP: 129/71 (07-30-23 @ 03:46) (121/73 - 129/71)  BP(mean): --  RR: 18 (07-30-23 @ 03:46) (18 - 18)  SpO2: 93% (07-30-23 @ 03:46) (93% - 93%)    CVS: S1S2   Respiratory: No wheezing, no crepitations  GI: Abdomen soft, bowel sounds positive  Musculoskeletal:  moves all extremities  : Voiding      Resident

## 2024-12-30 ENCOUNTER — APPOINTMENT (OUTPATIENT)
Dept: CV DIAGNOSITCS | Facility: HOSPITAL | Age: 64
End: 2024-12-30

## 2024-12-30 ENCOUNTER — OUTPATIENT (OUTPATIENT)
Dept: OUTPATIENT SERVICES | Facility: HOSPITAL | Age: 64
LOS: 1 days | End: 2024-12-30
Payer: MEDICARE

## 2024-12-30 ENCOUNTER — RESULT REVIEW (OUTPATIENT)
Age: 64
End: 2024-12-30

## 2024-12-30 DIAGNOSIS — I49.3 VENTRICULAR PREMATURE DEPOLARIZATION: ICD-10-CM

## 2024-12-30 DIAGNOSIS — Z87.19 PERSONAL HISTORY OF OTHER DISEASES OF THE DIGESTIVE SYSTEM: Chronic | ICD-10-CM

## 2024-12-30 PROCEDURE — 93306 TTE W/DOPPLER COMPLETE: CPT

## 2024-12-30 PROCEDURE — 93306 TTE W/DOPPLER COMPLETE: CPT | Mod: 26

## 2025-01-24 ENCOUNTER — APPOINTMENT (OUTPATIENT)
Dept: ELECTROPHYSIOLOGY | Facility: CLINIC | Age: 65
End: 2025-01-24

## 2025-01-24 PROCEDURE — 93294 REM INTERROG EVL PM/LDLS PM: CPT

## 2025-01-24 PROCEDURE — 93296 REM INTERROG EVL PM/IDS: CPT

## 2025-04-08 ENCOUNTER — APPOINTMENT (OUTPATIENT)
Dept: ELECTROPHYSIOLOGY | Facility: CLINIC | Age: 65
End: 2025-04-08
Payer: MEDICARE

## 2025-04-08 ENCOUNTER — NON-APPOINTMENT (OUTPATIENT)
Age: 65
End: 2025-04-08

## 2025-04-08 VITALS
HEART RATE: 61 BPM | BODY MASS INDEX: 24.8 KG/M2 | WEIGHT: 140 LBS | SYSTOLIC BLOOD PRESSURE: 117 MMHG | OXYGEN SATURATION: 95 % | DIASTOLIC BLOOD PRESSURE: 56 MMHG

## 2025-04-08 PROCEDURE — 99213 OFFICE O/P EST LOW 20 MIN: CPT | Mod: 25

## 2025-04-08 PROCEDURE — 93279 PRGRMG DEV EVAL PM/LDLS PM: CPT

## 2025-04-08 RX ORDER — DAPAGLIFLOZIN 10 MG/1
10 TABLET, FILM COATED ORAL
Qty: 90 | Refills: 3 | Status: ACTIVE | COMMUNITY
Start: 2025-04-08

## 2025-05-07 ENCOUNTER — APPOINTMENT (OUTPATIENT)
Dept: CARDIOLOGY | Facility: CLINIC | Age: 65
End: 2025-05-07
Payer: MEDICARE

## 2025-05-07 ENCOUNTER — NON-APPOINTMENT (OUTPATIENT)
Age: 65
End: 2025-05-07

## 2025-05-07 VITALS
SYSTOLIC BLOOD PRESSURE: 114 MMHG | BODY MASS INDEX: 23.74 KG/M2 | HEIGHT: 63 IN | DIASTOLIC BLOOD PRESSURE: 48 MMHG | OXYGEN SATURATION: 94 % | WEIGHT: 134 LBS | HEART RATE: 61 BPM

## 2025-05-07 DIAGNOSIS — I50.30 UNSPECIFIED DIASTOLIC (CONGESTIVE) HEART FAILURE: ICD-10-CM

## 2025-05-07 DIAGNOSIS — I49.3 VENTRICULAR PREMATURE DEPOLARIZATION: ICD-10-CM

## 2025-05-07 DIAGNOSIS — I50.9 HEART FAILURE, UNSPECIFIED: ICD-10-CM

## 2025-05-07 DIAGNOSIS — I48.91 UNSPECIFIED ATRIAL FIBRILLATION: ICD-10-CM

## 2025-05-07 DIAGNOSIS — I27.20 PULMONARY HYPERTENSION, UNSPECIFIED: ICD-10-CM

## 2025-05-07 PROCEDURE — G2211 COMPLEX E/M VISIT ADD ON: CPT

## 2025-05-07 PROCEDURE — 93000 ELECTROCARDIOGRAM COMPLETE: CPT

## 2025-05-07 PROCEDURE — 99215 OFFICE O/P EST HI 40 MIN: CPT

## 2025-05-07 RX ORDER — ASCORBIC ACID 125 MG
TABLET,CHEWABLE ORAL
Refills: 0 | Status: ACTIVE | COMMUNITY

## 2025-05-07 RX ORDER — CYANOCOBALAMIN (VITAMIN B-12) 1000 MCG
TABLET ORAL
Refills: 0 | Status: ACTIVE | COMMUNITY

## 2025-06-03 ENCOUNTER — APPOINTMENT (OUTPATIENT)
Dept: ENDOCRINOLOGY | Facility: CLINIC | Age: 65
End: 2025-06-03
Payer: MEDICARE

## 2025-06-03 PROCEDURE — G2211 COMPLEX E/M VISIT ADD ON: CPT | Mod: 2W

## 2025-06-03 PROCEDURE — 99204 OFFICE O/P NEW MOD 45 MIN: CPT | Mod: 2W

## 2025-06-05 NOTE — PHYSICAL THERAPY INITIAL EVALUATION ADULT - NSPTDISCHREC_GEN_A_CORE
Recommendations from today's MTM visit:                                                       - Continue Ozempic 0.5 mg once weekly  - Use remaining 0.5 mg Ozempic pens first  - Then can use 1 mg Ozempic pens to get 0.5 mg dose:    - Turn pen 36 clicks for 0.5 mg dose    - [place] 36 clicks on pen for future reference  - Change needle on Ozempic pen before each use, keep extra needles   - Continue glipizide and Jardiance every morning      Follow-up:  - Appointment on Friday, June 13 at 3:00 PM  - Location: [facility] (near [facility])    It was great speaking with you today.  I value your experience and would be very thankful for your time in providing feedback in our clinic survey. In the next few days, you may receive an email or text message from [employer] with a link to a survey related to your clinical pharmacist.    To schedule another MT appointment, please call [phone].    My Clinical Pharmacist's contact information:                                                      Please feel free to contact me with any questions or concerns you have.      Jazzy Horton, PharmD, BCACP  Medication Therapy Management Pharmacist  [facility]  [phone]          No skilled PT needs

## 2025-07-15 ENCOUNTER — NON-APPOINTMENT (OUTPATIENT)
Age: 65
End: 2025-07-15

## 2025-07-15 ENCOUNTER — APPOINTMENT (OUTPATIENT)
Dept: ELECTROPHYSIOLOGY | Facility: CLINIC | Age: 65
End: 2025-07-15

## 2025-07-15 PROCEDURE — 93294 REM INTERROG EVL PM/LDLS PM: CPT

## 2025-07-15 PROCEDURE — 93296 REM INTERROG EVL PM/IDS: CPT

## 2025-07-23 ENCOUNTER — APPOINTMENT (OUTPATIENT)
Dept: ENDOCRINOLOGY | Facility: CLINIC | Age: 65
End: 2025-07-23
Payer: MEDICARE

## 2025-07-23 ENCOUNTER — NON-APPOINTMENT (OUTPATIENT)
Age: 65
End: 2025-07-23

## 2025-07-23 DIAGNOSIS — E21.3 HYPERPARATHYROIDISM, UNSPECIFIED: ICD-10-CM

## 2025-07-23 DIAGNOSIS — M81.0 AGE-RELATED OSTEOPOROSIS W/OUT CURRENT PATHOLOGICAL FRACTURE: ICD-10-CM

## 2025-07-23 DIAGNOSIS — I50.9 HEART FAILURE, UNSPECIFIED: ICD-10-CM

## 2025-07-23 DIAGNOSIS — I27.20 PULMONARY HYPERTENSION, UNSPECIFIED: ICD-10-CM

## 2025-07-23 PROCEDURE — G2211 COMPLEX E/M VISIT ADD ON: CPT | Mod: 2W

## 2025-07-23 PROCEDURE — 99214 OFFICE O/P EST MOD 30 MIN: CPT | Mod: 2W

## 2025-07-27 PROBLEM — M81.0 AGE RELATED OSTEOPOROSIS: Status: ACTIVE | Noted: 2025-07-27

## 2025-07-27 PROBLEM — E21.3 HYPERPARATHYROIDISM: Status: ACTIVE | Noted: 2025-06-06

## 2025-08-13 ENCOUNTER — APPOINTMENT (OUTPATIENT)
Dept: CARDIOLOGY | Facility: CLINIC | Age: 65
End: 2025-08-13
Payer: MEDICARE

## 2025-08-13 ENCOUNTER — NON-APPOINTMENT (OUTPATIENT)
Age: 65
End: 2025-08-13

## 2025-08-13 VITALS
WEIGHT: 138 LBS | DIASTOLIC BLOOD PRESSURE: 60 MMHG | OXYGEN SATURATION: 98 % | HEART RATE: 70 BPM | SYSTOLIC BLOOD PRESSURE: 102 MMHG | HEIGHT: 63 IN | BODY MASS INDEX: 24.45 KG/M2

## 2025-08-13 DIAGNOSIS — I36.1 NONRHEUMATIC TRICUSPID (VALVE) INSUFFICIENCY: ICD-10-CM

## 2025-08-13 DIAGNOSIS — I50.30 UNSPECIFIED DIASTOLIC (CONGESTIVE) HEART FAILURE: ICD-10-CM

## 2025-08-13 DIAGNOSIS — I48.91 UNSPECIFIED ATRIAL FIBRILLATION: ICD-10-CM

## 2025-08-13 PROCEDURE — 93000 ELECTROCARDIOGRAM COMPLETE: CPT

## 2025-08-13 PROCEDURE — 99214 OFFICE O/P EST MOD 30 MIN: CPT | Mod: 25

## 2025-08-20 ENCOUNTER — APPOINTMENT (OUTPATIENT)
Dept: ENDOCRINOLOGY | Facility: CLINIC | Age: 65
End: 2025-08-20
Payer: MEDICARE

## 2025-08-20 DIAGNOSIS — L65.9 NONSCARRING HAIR LOSS, UNSPECIFIED: ICD-10-CM

## 2025-08-20 DIAGNOSIS — E21.3 HYPERPARATHYROIDISM, UNSPECIFIED: ICD-10-CM

## 2025-08-20 DIAGNOSIS — E21.0 PRIMARY HYPERPARATHYROIDISM: ICD-10-CM

## 2025-08-20 DIAGNOSIS — E54 ASCORBIC ACID DEFICIENCY: ICD-10-CM

## 2025-08-20 DIAGNOSIS — M81.0 AGE-RELATED OSTEOPOROSIS W/OUT CURRENT PATHOLOGICAL FRACTURE: ICD-10-CM

## 2025-08-20 PROCEDURE — 99214 OFFICE O/P EST MOD 30 MIN: CPT | Mod: 93

## 2025-08-20 PROCEDURE — G2211 COMPLEX E/M VISIT ADD ON: CPT | Mod: 93

## 2025-08-25 PROBLEM — M81.0 AGE RELATED OSTEOPOROSIS: Status: ACTIVE | Noted: 2025-08-25

## 2025-08-25 PROBLEM — E54 SCURVY: Status: ACTIVE | Noted: 2025-08-25

## 2025-08-25 PROBLEM — L65.9 HAIR LOSS: Status: ACTIVE | Noted: 2025-06-06

## 2025-08-25 PROBLEM — E21.0 PRIMARY HYPERPARATHYROIDISM: Status: ACTIVE | Noted: 2025-08-25
